# Patient Record
Sex: FEMALE | Race: WHITE | Employment: OTHER | URBAN - METROPOLITAN AREA
[De-identification: names, ages, dates, MRNs, and addresses within clinical notes are randomized per-mention and may not be internally consistent; named-entity substitution may affect disease eponyms.]

---

## 2017-01-04 ENCOUNTER — OFFICE VISIT (OUTPATIENT)
Dept: CARDIOLOGY CLINIC | Age: 67
End: 2017-01-04

## 2017-01-04 VITALS
WEIGHT: 131.4 LBS | SYSTOLIC BLOOD PRESSURE: 100 MMHG | DIASTOLIC BLOOD PRESSURE: 60 MMHG | HEART RATE: 62 BPM | HEIGHT: 64 IN | OXYGEN SATURATION: 99 % | BODY MASS INDEX: 22.43 KG/M2

## 2017-01-04 DIAGNOSIS — R42 DIZZINESS: ICD-10-CM

## 2017-01-04 DIAGNOSIS — R55 NEAR SYNCOPE: ICD-10-CM

## 2017-01-04 DIAGNOSIS — I95.9 HYPOTENSION, UNSPECIFIED HYPOTENSION TYPE: ICD-10-CM

## 2017-01-04 DIAGNOSIS — I48.91 NEW ONSET ATRIAL FIBRILLATION (HCC): Primary | ICD-10-CM

## 2017-01-04 PROCEDURE — G8484 FLU IMMUNIZE NO ADMIN: HCPCS | Performed by: NURSE PRACTITIONER

## 2017-01-04 PROCEDURE — G8399 PT W/DXA RESULTS DOCUMENT: HCPCS | Performed by: NURSE PRACTITIONER

## 2017-01-04 PROCEDURE — 3017F COLORECTAL CA SCREEN DOC REV: CPT | Performed by: NURSE PRACTITIONER

## 2017-01-04 PROCEDURE — 93000 ELECTROCARDIOGRAM COMPLETE: CPT | Performed by: NURSE PRACTITIONER

## 2017-01-04 PROCEDURE — 99214 OFFICE O/P EST MOD 30 MIN: CPT | Performed by: NURSE PRACTITIONER

## 2017-01-04 PROCEDURE — 4040F PNEUMOC VAC/ADMIN/RCVD: CPT | Performed by: NURSE PRACTITIONER

## 2017-01-04 PROCEDURE — G8427 DOCREV CUR MEDS BY ELIG CLIN: HCPCS | Performed by: NURSE PRACTITIONER

## 2017-01-04 PROCEDURE — 1111F DSCHRG MED/CURRENT MED MERGE: CPT | Performed by: NURSE PRACTITIONER

## 2017-01-04 PROCEDURE — 1123F ACP DISCUSS/DSCN MKR DOCD: CPT | Performed by: NURSE PRACTITIONER

## 2017-01-04 PROCEDURE — 3014F SCREEN MAMMO DOC REV: CPT | Performed by: NURSE PRACTITIONER

## 2017-01-04 PROCEDURE — 1090F PRES/ABSN URINE INCON ASSESS: CPT | Performed by: NURSE PRACTITIONER

## 2017-01-04 PROCEDURE — 1036F TOBACCO NON-USER: CPT | Performed by: NURSE PRACTITIONER

## 2017-01-04 PROCEDURE — G8419 CALC BMI OUT NRM PARAM NOF/U: HCPCS | Performed by: NURSE PRACTITIONER

## 2017-01-10 ENCOUNTER — TELEPHONE (OUTPATIENT)
Dept: CARDIOLOGY CLINIC | Age: 67
End: 2017-01-10

## 2017-01-26 ENCOUNTER — TELEPHONE (OUTPATIENT)
Dept: CARDIOLOGY CLINIC | Age: 67
End: 2017-01-26

## 2017-01-26 DIAGNOSIS — I48.19 PERSISTENT ATRIAL FIBRILLATION (HCC): Primary | ICD-10-CM

## 2017-02-07 ENCOUNTER — HOSPITAL ENCOUNTER (OUTPATIENT)
Dept: NON INVASIVE DIAGNOSTICS | Age: 67
Discharge: HOME | End: 2017-02-07
Attending: NURSE PRACTITIONER | Admitting: NURSE PRACTITIONER

## 2017-02-07 DIAGNOSIS — I48.19 PERSISTENT ATRIAL FIBRILLATION (HCC): ICD-10-CM

## 2017-02-07 LAB
LV EF: 75 %
LVEF MODALITY: NORMAL

## 2017-02-08 RX ORDER — FLECAINIDE ACETATE 50 MG/1
50 TABLET ORAL 2 TIMES DAILY
Qty: 60 TABLET | Refills: 3 | Status: SHIPPED | OUTPATIENT
Start: 2017-02-08 | End: 2017-05-16 | Stop reason: SDUPTHER

## 2017-02-08 RX ORDER — FLECAINIDE ACETATE 100 MG/1
50 TABLET ORAL EVERY 12 HOURS SCHEDULED
Status: DISCONTINUED | OUTPATIENT
Start: 2017-02-08 | End: 2017-02-08

## 2017-02-08 RX ORDER — DILTIAZEM HYDROCHLORIDE 120 MG/1
120 CAPSULE, COATED, EXTENDED RELEASE ORAL NIGHTLY
Status: DISCONTINUED | OUTPATIENT
Start: 2017-02-08 | End: 2017-02-08

## 2017-02-08 RX ORDER — DILTIAZEM HYDROCHLORIDE 120 MG/1
120 CAPSULE, COATED, EXTENDED RELEASE ORAL DAILY
Qty: 30 CAPSULE | Refills: 3 | Status: SHIPPED | OUTPATIENT
Start: 2017-02-08 | End: 2017-05-16 | Stop reason: SDUPTHER

## 2017-02-20 PROCEDURE — 93228 REMOTE 30 DAY ECG REV/REPORT: CPT | Performed by: NURSE PRACTITIONER

## 2017-02-21 ENCOUNTER — TELEPHONE (OUTPATIENT)
Dept: CARDIOLOGY CLINIC | Age: 67
End: 2017-02-21

## 2017-02-24 DIAGNOSIS — R42 DIZZINESS: ICD-10-CM

## 2017-02-24 DIAGNOSIS — I48.91 NEW ONSET ATRIAL FIBRILLATION (HCC): ICD-10-CM

## 2017-03-08 ENCOUNTER — OFFICE VISIT (OUTPATIENT)
Dept: CARDIOLOGY CLINIC | Age: 67
End: 2017-03-08

## 2017-03-08 VITALS
DIASTOLIC BLOOD PRESSURE: 80 MMHG | SYSTOLIC BLOOD PRESSURE: 120 MMHG | WEIGHT: 136.6 LBS | OXYGEN SATURATION: 94 % | HEIGHT: 64 IN | HEART RATE: 75 BPM | BODY MASS INDEX: 23.32 KG/M2

## 2017-03-08 DIAGNOSIS — R55 NEAR SYNCOPE: ICD-10-CM

## 2017-03-08 DIAGNOSIS — I48.91 NEW ONSET ATRIAL FIBRILLATION (HCC): Primary | ICD-10-CM

## 2017-03-08 DIAGNOSIS — I10 ESSENTIAL HYPERTENSION: ICD-10-CM

## 2017-03-08 PROBLEM — I95.0 IDIOPATHIC HYPOTENSION: Status: ACTIVE | Noted: 2017-03-08

## 2017-03-08 PROCEDURE — 4040F PNEUMOC VAC/ADMIN/RCVD: CPT | Performed by: NURSE PRACTITIONER

## 2017-03-08 PROCEDURE — 3014F SCREEN MAMMO DOC REV: CPT | Performed by: NURSE PRACTITIONER

## 2017-03-08 PROCEDURE — G8484 FLU IMMUNIZE NO ADMIN: HCPCS | Performed by: NURSE PRACTITIONER

## 2017-03-08 PROCEDURE — G8420 CALC BMI NORM PARAMETERS: HCPCS | Performed by: NURSE PRACTITIONER

## 2017-03-08 PROCEDURE — 3017F COLORECTAL CA SCREEN DOC REV: CPT | Performed by: NURSE PRACTITIONER

## 2017-03-08 PROCEDURE — G8427 DOCREV CUR MEDS BY ELIG CLIN: HCPCS | Performed by: NURSE PRACTITIONER

## 2017-03-08 PROCEDURE — 1090F PRES/ABSN URINE INCON ASSESS: CPT | Performed by: NURSE PRACTITIONER

## 2017-03-08 PROCEDURE — G8399 PT W/DXA RESULTS DOCUMENT: HCPCS | Performed by: NURSE PRACTITIONER

## 2017-03-08 PROCEDURE — 93000 ELECTROCARDIOGRAM COMPLETE: CPT | Performed by: NURSE PRACTITIONER

## 2017-03-08 PROCEDURE — 99213 OFFICE O/P EST LOW 20 MIN: CPT | Performed by: NURSE PRACTITIONER

## 2017-03-08 PROCEDURE — 1123F ACP DISCUSS/DSCN MKR DOCD: CPT | Performed by: NURSE PRACTITIONER

## 2017-03-08 PROCEDURE — 1036F TOBACCO NON-USER: CPT | Performed by: NURSE PRACTITIONER

## 2017-05-08 RX ORDER — APIXABAN 5 MG/1
TABLET, FILM COATED ORAL
Qty: 60 TABLET | Refills: 3 | Status: SHIPPED | OUTPATIENT
Start: 2017-05-08 | End: 2017-09-01 | Stop reason: SDUPTHER

## 2017-05-16 RX ORDER — FLECAINIDE ACETATE 50 MG/1
50 TABLET ORAL 2 TIMES DAILY
Qty: 60 TABLET | Refills: 5 | Status: SHIPPED | OUTPATIENT
Start: 2017-05-16 | End: 2017-11-28 | Stop reason: SDUPTHER

## 2017-05-16 RX ORDER — DILTIAZEM HYDROCHLORIDE 120 MG/1
120 CAPSULE, COATED, EXTENDED RELEASE ORAL DAILY
Qty: 30 CAPSULE | Refills: 5 | Status: SHIPPED | OUTPATIENT
Start: 2017-05-16 | End: 2017-11-28 | Stop reason: SDUPTHER

## 2017-09-01 RX ORDER — APIXABAN 5 MG/1
TABLET, FILM COATED ORAL
Qty: 60 TABLET | Refills: 4 | Status: SHIPPED | OUTPATIENT
Start: 2017-09-01 | End: 2018-02-05 | Stop reason: SDUPTHER

## 2017-09-18 ENCOUNTER — OFFICE VISIT (OUTPATIENT)
Dept: CARDIOLOGY CLINIC | Age: 67
End: 2017-09-18

## 2017-09-18 VITALS
OXYGEN SATURATION: 96 % | BODY MASS INDEX: 22.36 KG/M2 | HEART RATE: 76 BPM | HEIGHT: 64 IN | WEIGHT: 131 LBS | SYSTOLIC BLOOD PRESSURE: 124 MMHG | DIASTOLIC BLOOD PRESSURE: 78 MMHG

## 2017-09-18 DIAGNOSIS — I48.91 NEW ONSET ATRIAL FIBRILLATION (HCC): Primary | ICD-10-CM

## 2017-09-18 DIAGNOSIS — I10 ESSENTIAL HYPERTENSION: ICD-10-CM

## 2017-09-18 DIAGNOSIS — R42 DIZZINESS: ICD-10-CM

## 2017-09-18 DIAGNOSIS — R55 NEAR SYNCOPE: ICD-10-CM

## 2017-09-18 DIAGNOSIS — I95.0 IDIOPATHIC HYPOTENSION: ICD-10-CM

## 2017-09-18 PROCEDURE — 99213 OFFICE O/P EST LOW 20 MIN: CPT | Performed by: NURSE PRACTITIONER

## 2017-09-18 PROCEDURE — 1090F PRES/ABSN URINE INCON ASSESS: CPT | Performed by: NURSE PRACTITIONER

## 2017-09-18 PROCEDURE — G8420 CALC BMI NORM PARAMETERS: HCPCS | Performed by: NURSE PRACTITIONER

## 2017-09-18 PROCEDURE — 3017F COLORECTAL CA SCREEN DOC REV: CPT | Performed by: NURSE PRACTITIONER

## 2017-09-18 PROCEDURE — 3014F SCREEN MAMMO DOC REV: CPT | Performed by: NURSE PRACTITIONER

## 2017-09-18 PROCEDURE — 1036F TOBACCO NON-USER: CPT | Performed by: NURSE PRACTITIONER

## 2017-09-18 PROCEDURE — 4040F PNEUMOC VAC/ADMIN/RCVD: CPT | Performed by: NURSE PRACTITIONER

## 2017-09-18 PROCEDURE — 93000 ELECTROCARDIOGRAM COMPLETE: CPT | Performed by: NURSE PRACTITIONER

## 2017-09-18 PROCEDURE — 1123F ACP DISCUSS/DSCN MKR DOCD: CPT | Performed by: NURSE PRACTITIONER

## 2017-09-18 PROCEDURE — G8427 DOCREV CUR MEDS BY ELIG CLIN: HCPCS | Performed by: NURSE PRACTITIONER

## 2017-09-18 PROCEDURE — G8399 PT W/DXA RESULTS DOCUMENT: HCPCS | Performed by: NURSE PRACTITIONER

## 2017-11-28 RX ORDER — FLECAINIDE ACETATE 50 MG/1
50 TABLET ORAL 2 TIMES DAILY
Qty: 60 TABLET | Refills: 5 | Status: SHIPPED | OUTPATIENT
Start: 2017-11-28 | End: 2018-05-30 | Stop reason: SDUPTHER

## 2017-11-28 RX ORDER — DILTIAZEM HYDROCHLORIDE 120 MG/1
120 CAPSULE, COATED, EXTENDED RELEASE ORAL DAILY
Qty: 30 CAPSULE | Refills: 5 | Status: SHIPPED | OUTPATIENT
Start: 2017-11-28 | End: 2018-05-30 | Stop reason: SDUPTHER

## 2018-03-19 ENCOUNTER — OFFICE VISIT (OUTPATIENT)
Dept: CARDIOLOGY CLINIC | Age: 68
End: 2018-03-19

## 2018-03-19 VITALS
BODY MASS INDEX: 22.2 KG/M2 | SYSTOLIC BLOOD PRESSURE: 114 MMHG | HEART RATE: 64 BPM | DIASTOLIC BLOOD PRESSURE: 68 MMHG | WEIGHT: 130 LBS | OXYGEN SATURATION: 97 % | HEIGHT: 64 IN

## 2018-03-19 DIAGNOSIS — R55 SYNCOPE, UNSPECIFIED SYNCOPE TYPE: ICD-10-CM

## 2018-03-19 DIAGNOSIS — I10 ESSENTIAL HYPERTENSION: ICD-10-CM

## 2018-03-19 DIAGNOSIS — I48.91 NEW ONSET ATRIAL FIBRILLATION (HCC): Primary | ICD-10-CM

## 2018-03-19 LAB
ALBUMIN SERPL-MCNC: 4.7 G/DL (ref 3.4–5)
ANION GAP SERPL CALCULATED.3IONS-SCNC: 13 MMOL/L (ref 3–16)
BUN BLDV-MCNC: 16 MG/DL (ref 7–20)
CALCIUM SERPL-MCNC: 9.4 MG/DL (ref 8.3–10.6)
CHLORIDE BLD-SCNC: 105 MMOL/L (ref 99–110)
CO2: 26 MMOL/L (ref 21–32)
CREAT SERPL-MCNC: 0.8 MG/DL (ref 0.6–1.2)
GFR AFRICAN AMERICAN: >60
GFR NON-AFRICAN AMERICAN: >60
GLUCOSE BLD-MCNC: 86 MG/DL (ref 70–99)
MAGNESIUM: 2 MG/DL (ref 1.8–2.4)
PHOSPHORUS: 3.5 MG/DL (ref 2.5–4.9)
POTASSIUM SERPL-SCNC: 5 MMOL/L (ref 3.5–5.1)
SODIUM BLD-SCNC: 144 MMOL/L (ref 136–145)

## 2018-03-19 PROCEDURE — 99213 OFFICE O/P EST LOW 20 MIN: CPT | Performed by: NURSE PRACTITIONER

## 2018-03-19 PROCEDURE — G8484 FLU IMMUNIZE NO ADMIN: HCPCS | Performed by: NURSE PRACTITIONER

## 2018-03-19 PROCEDURE — G8399 PT W/DXA RESULTS DOCUMENT: HCPCS | Performed by: NURSE PRACTITIONER

## 2018-03-19 PROCEDURE — 3014F SCREEN MAMMO DOC REV: CPT | Performed by: NURSE PRACTITIONER

## 2018-03-19 PROCEDURE — 4040F PNEUMOC VAC/ADMIN/RCVD: CPT | Performed by: NURSE PRACTITIONER

## 2018-03-19 PROCEDURE — 93000 ELECTROCARDIOGRAM COMPLETE: CPT | Performed by: NURSE PRACTITIONER

## 2018-03-19 PROCEDURE — G8427 DOCREV CUR MEDS BY ELIG CLIN: HCPCS | Performed by: NURSE PRACTITIONER

## 2018-03-19 PROCEDURE — 1090F PRES/ABSN URINE INCON ASSESS: CPT | Performed by: NURSE PRACTITIONER

## 2018-03-19 PROCEDURE — 1123F ACP DISCUSS/DSCN MKR DOCD: CPT | Performed by: NURSE PRACTITIONER

## 2018-03-19 PROCEDURE — G8420 CALC BMI NORM PARAMETERS: HCPCS | Performed by: NURSE PRACTITIONER

## 2018-03-19 PROCEDURE — 1036F TOBACCO NON-USER: CPT | Performed by: NURSE PRACTITIONER

## 2018-03-19 PROCEDURE — 3017F COLORECTAL CA SCREEN DOC REV: CPT | Performed by: NURSE PRACTITIONER

## 2018-03-19 NOTE — PROGRESS NOTES
use drugs. Family History:  Reviewed. family history includes Heart Disease in her brother and father; Other in her father and mother. Review of System:    · General ROS: negative for chills, fever, change in weight   · Psychological ROS: negative for  anxiety or depression  · Ophthalmic ROS: negative for  eye pain or loss of vision  · ENT ROS: negative for  headaches, sore throat   · Allergy and Immunology ROS: negative for  hives  · Hematological and Lymphatic ROS: negative for  bleeding problems, blood clots, bruising or jaundice  · Endocrine ROS: negative for  skin changes, temperature intolerance or unexpected weight changes  · Respiratory ROS: negative for  cough, sputum, wheezing, shortness of breath   · Cardiovascular ROS: Per HPI. · Gastrointestinal ROS: negative for abdominal pain, diarrhea, nausea/vomiting, bleeding   · Genito-Urinary ROS: negative for  dysuria or incontinence  · Musculoskeletal ROS: negative for  joint swelling, pain    · Neurological ROS: negative for  confusion, numbness/tingling, seizures, weakness  · Dermatological ROS: negative for rash, changes in skin color, lesions     Physical Examination:  /68   Pulse 64   Ht 5' 4\" (1.626 m)   Wt 130 lb (59 kg) Comment: did not wish to remove shoes  SpO2 97%   BMI 22.31 kg/m²     · Constitutional: Oriented. No distress. · Head: Normocephalic and atraumatic. · Mouth/Throat: Oropharynx is clear and moist.   · Eyes: Conjunctivae normal. EOM are normal.   · Neck: Normal range of motion. Neck supple. No rigidity. No JVD present. · Cardiovascular: Normal rate, regular rhythm, S1&S2 and intact distal pulses. · Pulmonary/Chest: Bilateral respiratory sounds. No wheezes. No rhonchi. · Abdominal: Soft. Bowel sounds present. No distension, No tenderness. · Musculoskeletal: No tenderness. No edema    · Lymphadenopathy: Has no cervical adenopathy. · Neurological: Alert and oriented.  Cranial nerve appears intact, No

## 2018-03-30 ENCOUNTER — HOSPITAL ENCOUNTER (OUTPATIENT)
Dept: GENERAL RADIOLOGY | Age: 68
Discharge: HOME | End: 2018-03-30
Attending: FAMILY MEDICINE | Admitting: FAMILY MEDICINE

## 2018-03-30 DIAGNOSIS — M81.0 AGE-RELATED OSTEOPOROSIS WITHOUT CURRENT PATHOLOGICAL FRACTURE: ICD-10-CM

## 2018-05-23 RX ORDER — APIXABAN 5 MG/1
TABLET, FILM COATED ORAL
Qty: 60 TABLET | Refills: 3 | Status: SHIPPED | OUTPATIENT
Start: 2018-05-23 | End: 2018-10-10 | Stop reason: SDUPTHER

## 2018-05-30 RX ORDER — FLECAINIDE ACETATE 50 MG/1
50 TABLET ORAL 2 TIMES DAILY
Qty: 60 TABLET | Refills: 5 | Status: SHIPPED | OUTPATIENT
Start: 2018-05-30 | End: 2018-11-19 | Stop reason: SDUPTHER

## 2018-05-30 RX ORDER — DILTIAZEM HYDROCHLORIDE 120 MG/1
120 CAPSULE, COATED, EXTENDED RELEASE ORAL DAILY
Qty: 30 CAPSULE | Refills: 5 | Status: SHIPPED | OUTPATIENT
Start: 2018-05-30 | End: 2018-11-19 | Stop reason: SDUPTHER

## 2018-11-19 RX ORDER — FLECAINIDE ACETATE 50 MG/1
TABLET ORAL
Qty: 60 TABLET | Refills: 5 | Status: SHIPPED | OUTPATIENT
Start: 2018-11-19 | End: 2019-05-20 | Stop reason: SDUPTHER

## 2018-11-19 RX ORDER — DILTIAZEM HYDROCHLORIDE 120 MG/1
120 CAPSULE, EXTENDED RELEASE ORAL DAILY
Qty: 30 CAPSULE | Refills: 5 | Status: SHIPPED | OUTPATIENT
Start: 2018-11-19 | End: 2019-05-20 | Stop reason: SDUPTHER

## 2019-01-21 RX ORDER — APIXABAN 5 MG/1
TABLET, FILM COATED ORAL
Qty: 60 TABLET | Refills: 5 | Status: SHIPPED | OUTPATIENT
Start: 2019-01-21 | End: 2019-07-24 | Stop reason: SDUPTHER

## 2019-04-01 ENCOUNTER — OFFICE VISIT (OUTPATIENT)
Dept: CARDIOLOGY CLINIC | Age: 69
End: 2019-04-01
Payer: MEDICARE

## 2019-04-01 VITALS
BODY MASS INDEX: 21.68 KG/M2 | SYSTOLIC BLOOD PRESSURE: 92 MMHG | DIASTOLIC BLOOD PRESSURE: 66 MMHG | WEIGHT: 127 LBS | OXYGEN SATURATION: 98 % | HEART RATE: 64 BPM | HEIGHT: 64 IN

## 2019-04-01 DIAGNOSIS — I48.0 PAF (PAROXYSMAL ATRIAL FIBRILLATION) (HCC): Primary | ICD-10-CM

## 2019-04-01 PROCEDURE — 99214 OFFICE O/P EST MOD 30 MIN: CPT | Performed by: INTERNAL MEDICINE

## 2019-04-01 PROCEDURE — G8427 DOCREV CUR MEDS BY ELIG CLIN: HCPCS | Performed by: INTERNAL MEDICINE

## 2019-04-01 PROCEDURE — 3017F COLORECTAL CA SCREEN DOC REV: CPT | Performed by: INTERNAL MEDICINE

## 2019-04-01 PROCEDURE — 1036F TOBACCO NON-USER: CPT | Performed by: INTERNAL MEDICINE

## 2019-04-01 PROCEDURE — G8399 PT W/DXA RESULTS DOCUMENT: HCPCS | Performed by: INTERNAL MEDICINE

## 2019-04-01 PROCEDURE — 4040F PNEUMOC VAC/ADMIN/RCVD: CPT | Performed by: INTERNAL MEDICINE

## 2019-04-01 PROCEDURE — 1123F ACP DISCUSS/DSCN MKR DOCD: CPT | Performed by: INTERNAL MEDICINE

## 2019-04-01 PROCEDURE — 1090F PRES/ABSN URINE INCON ASSESS: CPT | Performed by: INTERNAL MEDICINE

## 2019-04-01 PROCEDURE — 93000 ELECTROCARDIOGRAM COMPLETE: CPT | Performed by: INTERNAL MEDICINE

## 2019-04-01 PROCEDURE — G8420 CALC BMI NORM PARAMETERS: HCPCS | Performed by: INTERNAL MEDICINE

## 2019-04-01 NOTE — LETTER
[address], Dr.       Allergies:  No Known Allergies    Medication:   Prior to Admission medications    Medication Sig Start Date End Date Taking? Authorizing Provider   ELIQUIS 5 MG TABS tablet TAKE 1 TABLET BY MOUTH TWICE DAILY 1/21/19  Yes Don Cuevas MD   CARTIA  MG extended release capsule TAKE 1 CAPSULE BY MOUTH DAILY 11/19/18  Yes Don Cuevas MD   flecainide (TAMBOCOR) 50 MG tablet TAKE 1 TABLET BY MOUTH TWICE DAILY 11/19/18  Yes Don Cuevas MD   Calcium-Magnesium-Vitamin D (CALCIUM 500 PO) Take 500 mg by mouth daily   Yes Historical Provider, MD   ranitidine (ZANTAC) 150 MG tablet Take 150 mg by mouth nightly. Yes Historical Provider, MD   calcitonin, salmon, (MIACALCIN) 200 UNIT/ACT nasal spray 1 spray by Nasal route daily. Yes Historical Provider, MD       Social History:   reports that she has never smoked. She has never used smokeless tobacco. She reports that she does not drink alcohol or use drugs. Family History:  family history includes Heart Disease in her brother and father; Other in her father and mother. Reviewed. Denies family history of sudden cardiac death, arrhythmia, premature CAD    Review of System:    · General ROS: negative for - chills, fever   · Psychological ROS: negative for - anxiety or depression  · Ophthalmic ROS: negative for - eye pain or loss of vision  · ENT ROS: negative for - epistaxis, headaches, nasal discharge, sore throat   · Allergy and Immunology ROS: negative for - hives, nasal congestion   · Hematological and Lymphatic ROS: negative for - bleeding problems, blood clots, bruising or jaundice  · Endocrine ROS: negative for - skin changes, temperature intolerance or unexpected weight changes  · Respiratory ROS: negative for - cough, hemoptysis, pleuritic pain, SOB, sputum changes or wheezing  · Cardiovascular ROS: Per HPI. · Gastrointestinal ROS: negative for - abdominal pain, blood in stools, diarrhea, hematemesis, melena, nausea/vomiting or swallowing difficulty/pain  · Genito-Urinary ROS: negative for - dysuria or incontinence  · Musculoskeletal ROS: negative for - joint swelling or muscle pain  · Neurological ROS: negative for - confusion, dizziness, gait disturbance, headaches, numbness/tingling, seizures, speech problems, tremors, visual changes or weakness  · Dermatological ROS: negative for - rash    Physical Examination:  Vitals:    04/01/19 1441   BP: 92/66   Site: Left Upper Arm   Position: Sitting   Pulse: 64   SpO2: 98%   Weight: 127 lb (57.6 kg)   Height: 5' 4\" (1.626 m)         · Constitutional: Oriented. No distress. · Head: Normocephalic and atraumatic. · Mouth/Throat: Oropharynx is clear and moist.   · Eyes: Conjunctivae normal. EOM are normal.   · Neck: Normal range of motion. Neck supple. No rigidity. No JVD present. · Cardiovascular: Normal rate, regular rhythm, S1&S2 and intact distal pulses. · Pulmonary/Chest: Bilateral respiratory sounds. No wheezes. No rhonchi. · Abdominal: Soft. Bowel sounds present. No distension, No tenderness. · Musculoskeletal: No tenderness. No edema    · Lymphadenopathy: Has no cervical adenopathy. · Neurological: Alert and oriented. Cranial nerve appears intact, No Gross deficit   · Skin: Skin is warm and dry. No rash noted. · Psychiatric: Has a normal mood, affect and behavior     Labs:  Reviewed. ECG: reviewed, Sinus  Rhythm 65 bpm, with QRS duration 94 ms. No pathologic Q waves, ventricular pre-excitation, or QT prolongation. QTc 442. Studies:   1. Event monitor: 1/10/17-2/10/17  Baseline SR   AF with RVR, symptoms correlate with RVR     2. Echo: 12/16/16  Summary   Left ventricle size is normal.   Normal left ventricular wall thickness.   Ejection fraction is visually estimated to be 60%.   Normal right ventricular size and function.  No significant valvular disease. 3. Stress Test:  2/7/17   Summary    Normal myocardial perfusion study.    Normal LV size and systolic function.  ECG portion of stress test is normal.    Alford score equals 4.    Overall findings represent a low risk scan.         4. Cath: none     The MCOT, echocardiogram, stress test, and coronary angiography/PCI were reviewed by myself and used for my plan of care. Procedures:  1. None     Assessment/Plan:     Atrial fibrillation, paroxsymal   [id number] is 2 with a 3.2% per year stroke risk age, sex, HTN   No s/s of TIA/CVA   Her current medical therapy for her atrial fibrillation is Eliquis 5 mg BID, Cartia  mg daily, Flecainide 50 mg BID. She reports medical therapy compliance and feels she is tolerating. No abnormal bruising or bleeding. We discussed treatment options for Atrial fibrillation including medical vs. Ablation therapy. At this time, she would like to proceed with medical therapy. Hypertension, essential   She does monitor her HR and BP twice daily and keeps a log. Stable SBP high 90's to low 100's. Asked her to maintain good hydration, no more than 3G sodium daily. We discussed reading labels. Yearly follow up       Thank you for allowing me to participate in the care of Dary Powell. All questions and concerns were addressed to the patient/family. Alternatives to my treatment were discussed. This note was scribed in the presence of Dr. Jose Duggan MD by Hillary Barajas RN. The scribe's documentation has been prepared under my direction and personally reviewed by me in its entirety. I confirm that the note above accurately reflects all work, physical examination, the discussion of treatments and procedures, and medical decision making performed by me.       Jose Duggan MD, MS, [facility] [place], [place] [address] Electrophysiology  [address]  [address] [facility], [address] [facility]  [place] [address] [phone]

## 2019-04-01 NOTE — PATIENT INSTRUCTIONS

## 2019-04-01 NOTE — PROGRESS NOTES
[facility]   Cardiac Electrophysiology Consultation   Date: 4/1/2019  Reason for Consultation: AFIB  Consult Requesting Physician: Dr. Dixon/JAELYN Calvo      Chief Complaint:   Chief Complaint   Patient presents with    Atrial Fibrillation     Last seen by TRAM Calvo 3/19/18. No cardiac complaints today. HPI: Viviane Galarza is a 70 y.o. with PMH significant for syncope, hypotension, pAFIB-new onset 12/16/2016 0400. Converted per cardizem gtt. At that time, she had diarrhea, near syncope, fell. \"woozy and weak feeling. \" CT revealed extensive colitis at that time. Interval History: Today, she present for 1 year follow up and states she has been doing \"very well with no cardiac complaints. \" Her current medical therapy for her atrial fibrillation is Eliquis 5 mg BID, Cartia  mg daily, Flecainide 50 mg BID. Mercedes Epley is 2 with a 3.2% per year stroke risk age, sex, HTN . Cyaida Cates No s/s of TIA/CVA. She does monitor her HR and BP twice daily and keeps a log. Stable SBP high 90's to low 100's. Asked her to maintain good hydration, no more than 3G sodium daily. We discussed reading labels. She reports medical therapy compliance and feels she is tolerating. No abnormal bruising or bleeding. Patient denies any symptoms of chest pain, pressure, tightness, shortness of breath at rest, CORONA, nausea, vomiting, near syncope, syncope, heart racing, palpitations, dizziness, lightheaded, PND, orthopnea, wheezing, diaphoresis, BLE edema, bilateral lower extremities pain, cramping or fatigue. We discussed treatment options for Atrial fibrillation including medical vs. Ablation therapy. At this time, she would like to proceed with medical therapy.        Past Medical History:   Diagnosis Date    Acid reflux         Past Surgical History:   Procedure Laterality Date    COLONOSCOPY  11/04/2014    ENDOSCOPY, COLON, DIAGNOSTIC  11/04/2014    with biopsy   Azeb Valiente    TUBAL LIGATION Bilateral [address], Dr. Marking       Allergies:  No Known Allergies    Medication:   Prior to Admission medications    Medication Sig Start Date End Date Taking? Authorizing Provider   ELIQUIS 5 MG TABS tablet TAKE 1 TABLET BY MOUTH TWICE DAILY 1/21/19  Yes Chantal Barros MD   CARTIA  MG extended release capsule TAKE 1 CAPSULE BY MOUTH DAILY 11/19/18  Yes Chantal Barros MD   flecainide (TAMBOCOR) 50 MG tablet TAKE 1 TABLET BY MOUTH TWICE DAILY 11/19/18  Yes Chantal Barros MD   Calcium-Magnesium-Vitamin D (CALCIUM 500 PO) Take 500 mg by mouth daily   Yes Historical Provider, MD   ranitidine (ZANTAC) 150 MG tablet Take 150 mg by mouth nightly. Yes Historical Provider, MD   calcitonin, salmon, (MIACALCIN) 200 UNIT/ACT nasal spray 1 spray by Nasal route daily. Yes Historical Provider, MD       Social History:   reports that she has never smoked. She has never used smokeless tobacco. She reports that she does not drink alcohol or use drugs. Family History:  family history includes Heart Disease in her brother and father; Other in her father and mother. Reviewed. Denies family history of sudden cardiac death, arrhythmia, premature CAD    Review of System:    · General ROS: negative for - chills, fever   · Psychological ROS: negative for - anxiety or depression  · Ophthalmic ROS: negative for - eye pain or loss of vision  · ENT ROS: negative for - epistaxis, headaches, nasal discharge, sore throat   · Allergy and Immunology ROS: negative for - hives, nasal congestion   · Hematological and Lymphatic ROS: negative for - bleeding problems, blood clots, bruising or jaundice  · Endocrine ROS: negative for - skin changes, temperature intolerance or unexpected weight changes  · Respiratory ROS: negative for - cough, hemoptysis, pleuritic pain, SOB, sputum changes or wheezing  · Cardiovascular ROS: Per HPI.    · Gastrointestinal ROS: negative for - abdominal pain, blood in stools, diarrhea, hematemesis, melena, nausea/vomiting or swallowing difficulty/pain  · Genito-Urinary ROS: negative for - dysuria or incontinence  · Musculoskeletal ROS: negative for - joint swelling or muscle pain  · Neurological ROS: negative for - confusion, dizziness, gait disturbance, headaches, numbness/tingling, seizures, speech problems, tremors, visual changes or weakness  · Dermatological ROS: negative for - rash    Physical Examination:  Vitals:    04/01/19 1441   BP: 92/66   Site: Left Upper Arm   Position: Sitting   Pulse: 64   SpO2: 98%   Weight: 127 lb (57.6 kg)   Height: 5' 4\" (1.626 m)         · Constitutional: Oriented. No distress. · Head: Normocephalic and atraumatic. · Mouth/Throat: Oropharynx is clear and moist.   · Eyes: Conjunctivae normal. EOM are normal.   · Neck: Normal range of motion. Neck supple. No rigidity. No JVD present. · Cardiovascular: Normal rate, regular rhythm, S1&S2 and intact distal pulses. · Pulmonary/Chest: Bilateral respiratory sounds. No wheezes. No rhonchi. · Abdominal: Soft. Bowel sounds present. No distension, No tenderness. · Musculoskeletal: No tenderness. No edema    · Lymphadenopathy: Has no cervical adenopathy. · Neurological: Alert and oriented. Cranial nerve appears intact, No Gross deficit   · Skin: Skin is warm and dry. No rash noted. · Psychiatric: Has a normal mood, affect and behavior     Labs:  Reviewed. ECG: reviewed, Sinus  Rhythm 65 bpm, with QRS duration 94 ms. No pathologic Q waves, ventricular pre-excitation, or QT prolongation. QTc 442. Studies:   1. Event monitor: 1/10/17-2/10/17  Baseline SR   AF with RVR, symptoms correlate with RVR     2. Echo: 12/16/16  Summary   Left ventricle size is normal.   Normal left ventricular wall thickness.   Ejection fraction is visually estimated to be 60%.   Normal right ventricular size and function.   No significant valvular disease.     3. Stress Test:  2/7/17   Summary    Normal myocardial perfusion study.    Normal LV size and systolic function.  ECG portion of stress test is normal.    Alford score equals 4.    Overall findings represent a low risk scan.         4. Cath: none     The MCOT, echocardiogram, stress test, and coronary angiography/PCI were reviewed by myself and used for my plan of care. Procedures:  1. None     Assessment/Plan:     Atrial fibrillation, paroxsymal   [id number] is 2 with a 3.2% per year stroke risk age, sex, HTN   No s/s of TIA/CVA   Her current medical therapy for her atrial fibrillation is Eliquis 5 mg BID, Cartia  mg daily, Flecainide 50 mg BID. She reports medical therapy compliance and feels she is tolerating. No abnormal bruising or bleeding. We discussed treatment options for Atrial fibrillation including medical vs. Ablation therapy. At this time, she would like to proceed with medical therapy. Hypertension, essential   She does monitor her HR and BP twice daily and keeps a log. Stable SBP high 90's to low 100's. Asked her to maintain good hydration, no more than 3G sodium daily. We discussed reading labels. Yearly follow up       Thank you for allowing me to participate in the care of Kimberly Clinton. All questions and concerns were addressed to the patient/family. Alternatives to my treatment were discussed. This note was scribed in the presence of Dr. Venu Saldaña MD by Andressa Man RN. The scribe's documentation has been prepared under my direction and personally reviewed by me in its entirety. I confirm that the note above accurately reflects all work, physical examination, the discussion of treatments and procedures, and medical decision making performed by me.       Venu Saldaña MD, MS, [facility] [place], [facility]  Cardiac Electrophysiology  [address]  [address] [facility], [facility] [facility]  [place] [facility] [address]  [phone]

## 2019-05-20 RX ORDER — DILTIAZEM HYDROCHLORIDE 120 MG/1
CAPSULE, COATED, EXTENDED RELEASE ORAL
Qty: 30 CAPSULE | Refills: 2 | Status: SHIPPED | OUTPATIENT
Start: 2019-05-20 | End: 2019-08-19 | Stop reason: SDUPTHER

## 2019-05-20 RX ORDER — FLECAINIDE ACETATE 50 MG/1
TABLET ORAL
Qty: 60 TABLET | Refills: 2 | Status: SHIPPED | OUTPATIENT
Start: 2019-05-20 | End: 2019-08-19 | Stop reason: SDUPTHER

## 2019-05-20 NOTE — TELEPHONE ENCOUNTER
Last O/V:  4/1/19  Next O/V:  X Patient requests call back with recommendations on what she can take for bronchitis

## 2019-07-24 RX ORDER — APIXABAN 5 MG/1
TABLET, FILM COATED ORAL
Qty: 60 TABLET | Refills: 2 | Status: CANCELLED | OUTPATIENT
Start: 2019-07-24

## 2019-08-21 RX ORDER — DILTIAZEM HYDROCHLORIDE 120 MG/1
CAPSULE, EXTENDED RELEASE ORAL
Qty: 30 CAPSULE | Refills: 0 | Status: SHIPPED | OUTPATIENT
Start: 2019-08-21 | End: 2019-09-18 | Stop reason: SDUPTHER

## 2019-08-21 RX ORDER — FLECAINIDE ACETATE 50 MG/1
TABLET ORAL
Qty: 60 TABLET | Refills: 0 | Status: SHIPPED | OUTPATIENT
Start: 2019-08-21 | End: 2019-09-18 | Stop reason: SDUPTHER

## 2019-09-19 RX ORDER — DILTIAZEM HYDROCHLORIDE 120 MG/1
CAPSULE, EXTENDED RELEASE ORAL
Qty: 30 CAPSULE | Refills: 0 | Status: SHIPPED | OUTPATIENT
Start: 2019-09-19 | End: 2019-10-14 | Stop reason: SDUPTHER

## 2019-09-19 RX ORDER — FLECAINIDE ACETATE 50 MG/1
TABLET ORAL
Qty: 60 TABLET | Refills: 0 | Status: SHIPPED | OUTPATIENT
Start: 2019-09-19 | End: 2019-10-14 | Stop reason: SDUPTHER

## 2019-09-25 RX ORDER — APIXABAN 5 MG/1
TABLET, FILM COATED ORAL
Qty: 60 TABLET | Refills: 1 | Status: SHIPPED | OUTPATIENT
Start: 2019-09-25 | End: 2019-11-19 | Stop reason: SDUPTHER

## 2019-10-15 RX ORDER — FLECAINIDE ACETATE 50 MG/1
TABLET ORAL
Qty: 60 TABLET | Refills: 3 | Status: SHIPPED | OUTPATIENT
Start: 2019-10-15 | End: 2020-02-24

## 2019-10-15 RX ORDER — DILTIAZEM HYDROCHLORIDE 120 MG/1
CAPSULE, EXTENDED RELEASE ORAL
Qty: 30 CAPSULE | Refills: 3 | Status: SHIPPED | OUTPATIENT
Start: 2019-10-15 | End: 2020-02-19 | Stop reason: ALTCHOICE

## 2019-10-30 ENCOUNTER — TELEPHONE (OUTPATIENT)
Dept: CARDIOLOGY CLINIC | Age: 69
End: 2019-10-30

## 2019-11-19 RX ORDER — APIXABAN 5 MG/1
TABLET, FILM COATED ORAL
Qty: 60 TABLET | Refills: 2 | Status: SHIPPED | OUTPATIENT
Start: 2019-11-19 | End: 2020-02-19 | Stop reason: SDUPTHER

## 2019-11-19 RX ORDER — APIXABAN 5 MG/1
TABLET, FILM COATED ORAL
Qty: 60 TABLET | Refills: 0 | Status: CANCELLED | OUTPATIENT
Start: 2019-11-19

## 2020-01-24 ENCOUNTER — TELEPHONE (OUTPATIENT)
Dept: CARDIOLOGY CLINIC | Age: 70
End: 2020-01-24

## 2020-01-24 NOTE — TELEPHONE ENCOUNTER
Please advise of any recs. Per last OV - Dr. Quinonez Theodore in Care Everywhere 01/10/20 -  Hold cardizem. bp has been averaging 80-98/60. as low as 86/61. Need to hold for one week and evaluate. The most pressing thing today is stomach flu symptoms for one day. She is nauseas and feeling dizzy. afebrile. orthostats negative in the office. </p>    Called/spoke to pt. She states that she has been off [place] for 11 days now. BP has been in 90/60 range and this AM it was 87/64. Readings are since being off the [place]. Her pulse has been ranging from 80 - 90.

## 2020-01-24 NOTE — TELEPHONE ENCOUNTER
Pt states she was having trouble with her BP going low. Her PCP told her to stop taking CARTIA for a week. She still noticed that it was going low but was nervous to go back on the [place]. She wants to know if she should go back on it or if there was a different medication.      Please call Agustin Moore back at [phone]

## 2020-02-18 NOTE — PROGRESS NOTES
2 times daily 7/24/19  Yes Cory Garrett MD   Calcium-Magnesium-Vitamin D (CALCIUM 500 PO) Take 500 mg by mouth daily   Yes Historical Provider, MD   ranitidine (ZANTAC) 150 MG tablet Take 150 mg by mouth nightly. Yes Historical Provider, MD   calcitonin, salmon, (MIACALCIN) 200 UNIT/ACT nasal spray 1 spray by Nasal route daily. Yes Historical Provider, MD   CARTIA  MG extended release capsule TAKE 1 CAPSULE BY MOUTH DAILY  Patient not taking: Reported on 2/19/2020 10/15/19   Cory Garrett MD       Social History:   reports that she has never smoked. She has never used smokeless tobacco. She reports that she does not drink alcohol or use drugs. Family History:  family history includes Heart Disease in her brother and father; Other in her father and mother. Reviewed. Denies family history of sudden cardiac death, arrhythmia, premature CAD    Review of System:    · General ROS: negative for - chills, fever   · Psychological ROS: negative for - anxiety or depression  · Ophthalmic ROS: negative for - eye pain or loss of vision  · ENT ROS: negative for - epistaxis, headaches, nasal discharge, sore throat   · Allergy and Immunology ROS: negative for - hives, nasal congestion   · Hematological and Lymphatic ROS: negative for - bleeding problems, blood clots, bruising or jaundice  · Endocrine ROS: negative for - skin changes, temperature intolerance or unexpected weight changes  · Respiratory ROS: negative for - cough, hemoptysis, pleuritic pain, SOB, sputum changes or wheezing  · Cardiovascular ROS: Per HPI.    · Gastrointestinal ROS: negative for - abdominal pain, blood in stools, diarrhea, hematemesis, melena, nausea/vomiting or swallowing difficulty/pain  · Genito-Urinary ROS: negative for - dysuria or incontinence  · Musculoskeletal ROS: negative for - joint swelling or muscle pain  · Neurological ROS: negative for - confusion, dizziness, gait disturbance, headaches, numbness/tingling, seizures, speech problems, tremors, visual changes or weakness  · Dermatological ROS: negative for - rash    Physical Examination:  Vitals:    20 1107   BP: 110/66   Site: Left Upper Arm   Position: Sitting   Pulse: 85   SpO2: 96%   Weight: 118 lb (53.5 kg)   Height: 5' 4\" (1.626 m)         · Constitutional: Oriented. No distress. · Head: Normocephalic and atraumatic. · Mouth/Throat: Oropharynx is clear and moist.   · Eyes: Conjunctivae normal. EOM are normal.   · Neck: Normal range of motion. Neck supple. No rigidity. No JVD present. · Cardiovascular: Normal rate, regular rhythm, S1&S2 and intact distal pulses. · Pulmonary/Chest: Bilateral respiratory sounds. No wheezes. No rhonchi. · Abdominal: Soft. Bowel sounds present. No distension, No tenderness. · Musculoskeletal: No tenderness. No edema    · Lymphadenopathy: Has no cervical adenopathy. · Neurological: Alert and oriented. Cranial nerve appears intact, No Gross deficit   · Skin: Skin is warm and dry. No rash noted. · Psychiatric: Has a normal mood, affect and behavior     Labs:  Reviewed. EC20 reviewed, sinus rhythmn 73 bpm, with QRS duration 92 ms. No pathologic Q waves, ventricular pre-excitation, or QT prolongation. QTc 442. Studies:   1. Event monitor: 1/10/17-2/10/17  Baseline SR   AF with RVR, symptoms correlate with RVR     2. Echo: 16  Summary   Left ventricle size is normal.   Normal left ventricular wall thickness.   Ejection fraction is visually estimated to be 60%.   Normal right ventricular size and function.   No significant valvular disease. 3. Stress Test:  17   Summary    Normal myocardial perfusion study.    Normal LV size and systolic function.     ECG portion of stress test is normal.    Alford score equals 4.    Overall findings represent a low risk scan.         4. Cath: none     The MCOT, echocardiogram, stress test, and coronary angiography/PCI were reviewed by myself and used for my plan of to, bleeding, infection, radiation exposure, injury to vascular, cardiac and surrounding structures (including pneumothorax), stroke, cardiac perforation, tamponade, need for emergent open heart surgery, need for pacemaker implantation, injury to the phrenic nerve, injury to the esophagus, myocardial infarction and death were discussed in detail. The patient would like some time to deliberate further regarding procedure. If she wishes to proceed, he will contact our nurse, Vish Cook at which time we will schedule for a radiofrequency ablation with Carto Navigation system with a GIANNA procedure immediately prior to this ablation. A cardiac CTA will be ordered for pulmonary vein mapping prior to this procedure. We will hold Eliquis  for 12 hours prior to procedure. We will order BMP, CBC, PT/INR, and Type & Screen prior to the procedure. Hypertension, essential   She reports low blood pressure readings. SBP occasionally in 70's and 80's  most time running high . Stop Cartia today start Toprol XL 12.5 mg daily. Advised that she can increase her sodium intake over the recommended 2400 mg daily. Follow up 3 months after atrial fibrillation ablation if she decides to proceed other wise follow up in one year. Thank you for allowing me to participate in the care of Zulema Elias. All questions and concerns were addressed to the patient/family. Alternatives to my treatment were discussed. This note was scribed in the presence of Pina Talley MD by Maynor Sierra RN. The scribe's documentation has been prepared under my direction and personally reviewed by me in its entirety. I confirm that the note above accurately reflects all work, physical examination, the discussion of treatments and procedures, and medical decision making performed by me.       Pina Talley MD, MS, [facility] [place], [facility] [address] Electrophysiology  [address]  [address] [facility], [address], [facility] [address]  [phone]

## 2020-02-19 ENCOUNTER — OFFICE VISIT (OUTPATIENT)
Dept: CARDIOLOGY CLINIC | Age: 70
End: 2020-02-19
Payer: MEDICARE

## 2020-02-19 VITALS
HEART RATE: 85 BPM | HEIGHT: 64 IN | SYSTOLIC BLOOD PRESSURE: 110 MMHG | WEIGHT: 118 LBS | DIASTOLIC BLOOD PRESSURE: 66 MMHG | OXYGEN SATURATION: 96 % | BODY MASS INDEX: 20.14 KG/M2

## 2020-02-19 PROCEDURE — G8420 CALC BMI NORM PARAMETERS: HCPCS | Performed by: INTERNAL MEDICINE

## 2020-02-19 PROCEDURE — 1036F TOBACCO NON-USER: CPT | Performed by: INTERNAL MEDICINE

## 2020-02-19 PROCEDURE — G8484 FLU IMMUNIZE NO ADMIN: HCPCS | Performed by: INTERNAL MEDICINE

## 2020-02-19 PROCEDURE — 93000 ELECTROCARDIOGRAM COMPLETE: CPT | Performed by: INTERNAL MEDICINE

## 2020-02-19 PROCEDURE — G8427 DOCREV CUR MEDS BY ELIG CLIN: HCPCS | Performed by: INTERNAL MEDICINE

## 2020-02-19 PROCEDURE — 4040F PNEUMOC VAC/ADMIN/RCVD: CPT | Performed by: INTERNAL MEDICINE

## 2020-02-19 PROCEDURE — 99214 OFFICE O/P EST MOD 30 MIN: CPT | Performed by: INTERNAL MEDICINE

## 2020-02-19 PROCEDURE — 1090F PRES/ABSN URINE INCON ASSESS: CPT | Performed by: INTERNAL MEDICINE

## 2020-02-19 PROCEDURE — 1123F ACP DISCUSS/DSCN MKR DOCD: CPT | Performed by: INTERNAL MEDICINE

## 2020-02-19 PROCEDURE — 3017F COLORECTAL CA SCREEN DOC REV: CPT | Performed by: INTERNAL MEDICINE

## 2020-02-19 PROCEDURE — G8399 PT W/DXA RESULTS DOCUMENT: HCPCS | Performed by: INTERNAL MEDICINE

## 2020-02-19 RX ORDER — METOPROLOL SUCCINATE 25 MG/1
12.5 TABLET, EXTENDED RELEASE ORAL DAILY
Qty: 45 TABLET | Refills: 3 | Status: SHIPPED | OUTPATIENT
Start: 2020-02-19 | End: 2020-11-12 | Stop reason: ALTCHOICE

## 2020-02-19 NOTE — PATIENT INSTRUCTIONS
If you choose to proceed with atrial fibrillation ablation please contact Dr. Eloise Holland at [phone]. Atrial Fibrillation Ablation Pre procedure Instructions    As part of your pre procedure work up you will need to get a CT scan of your heart. This scan is completed in order to give Dr. Parish Tabares a map of the atrium (chamber of your heart) where he will be doing the ablation. CTA Pre procedure instructions      The office will be in contact to schedule this test in the near future.  -Arrive 20 minutes prior to scheduled testing time  -Nothing to eat or drink for at least 4 hours prior to test   -You will need to get lab work 5-7 days prior to this test (Renal panel) to check your kidney function. You will be receiving intravenous dye for the procedure and the doctor needs to check to make sure your kidneys are functioning properly prior to the test.    Atrial Fibrillation Ablation Pre procedure Instructions    Our office will be in contact with you to schedule your procedure approximately  two months prior to procedure date. The morning of your procedure you will park in the hospital parking lot and report directly to the cath lab to check in.      Pre-Procedure Instructions   1. You will need to fast (nothing to eat or drink) for at least 8 hours prior to procedure. 2. You will need to hold your Flecanide for 7 days prior to the procedure. 3. You will need to hold your Eliquis for 12 hours prior to the procedure. 4. Do not use any lotions, creams or perfume the morning of procedure. 5. You will need to complete pre-procedure lab work  5-7 days prior to procedure. Please take the enclosed order to the lab with you. 6. Please have a responsible adult to drive you home after procedure, you will stay overnight.    7. Cath lab will provide you with all post procedure instructions     3 month follow up will be scheduled with Dr. Parish Tabares post procedure    Patient Education        Atrial Fibrillation: Care Instructions  Your Care Instructions    Atrial fibrillation is an irregular and often fast heartbeat. Treating this condition is important for several reasons. It can cause blood clots, which can travel from your heart to your brain and cause a stroke. If you have a fast heartbeat, you may feel lightheaded, dizzy, and weak. An irregular heartbeat can also increase your risk for heart failure. Atrial fibrillation is often the result of another heart condition, such as high blood pressure or coronary artery disease. Making changes to improve your heart condition will help you stay healthy and active. Follow-up care is a key part of your treatment and safety. Be sure to make and go to all appointments, and call your doctor if you are having problems. It's also a good idea to know your test results and keep a list of the medicines you take. How can you care for yourself at home? Medicines    · Take your medicines exactly as prescribed. Call your doctor if you think you are having a problem with your medicine. You will get more details on the specific medicines your doctor prescribes.     · If your doctor has given you a blood thinner to prevent a stroke, be sure you get instructions about how to take your medicine safely. Blood thinners can cause serious bleeding problems.     · Do not take any vitamins, over-the-counter drugs, or herbal products without talking to your doctor first.    Lifestyle changes    · Do not smoke. Smoking can increase your chance of a stroke and heart attack. If you need help quitting, talk to your doctor about stop-smoking programs and medicines. These can increase your chances of quitting for good.     · Eat a heart-healthy diet.     · Stay at a healthy weight. Lose weight if you need to.     · Limit alcohol to 2 drinks a day for men and 1 drink a day for women. Too much alcohol can cause health problems.     · Avoid colds and flu. Get a pneumococcal vaccine shot.  If you have had one include:  ? Sudden numbness, tingling, weakness, or loss of movement in your face, arm, or leg, especially on only one side of your body. ? Sudden vision changes. ? Sudden trouble speaking. ? Sudden confusion or trouble understanding simple statements. ? Sudden problems with walking or balance. ? A sudden, severe headache that is different from past headaches.     · You passed out (lost consciousness).    Call your doctor now or seek immediate medical care if:    · You have new or increased shortness of breath.     · You feel dizzy or lightheaded, or you feel like you may faint.     · Your heart rate becomes irregular.     · You can feel your heart flutter in your chest or skip heartbeats. Tell your doctor if these symptoms are new or worse.    Watch closely for changes in your health, and be sure to contact your doctor if you have any problems. Where can you learn more? Go to https://SymtavisionpejoseTerapeak.Hittahem. org and sign in to your Giftbar account. Enter U020 in the Eventmag.ru box to learn more about \"Atrial Fibrillation: Care Instructions. \"     If you do not have an account, please click on the \"Sign Up Now\" link. Current as of: April 9, 2019  Content Version: 12.3  © 2092-7195 [employer]. Care instructions adapted under license by [facility] ([facility]). If you have questions about a medical condition or this instruction, always ask your healthcare professional. [place] [address] any warranty or liability for your use of this information. Patient Education        Learning About Catheter Ablation for Heart Rhythm Problems  What is catheter ablation? Catheter ablation is a procedure that treats heart rhythm problems. These problems include atrial fibrillation, supraventricular tachycardia (SVT), atrial flutter, and ventricular tachycardia. Your heart should have a strong, steady beat. That beat is controlled by the heart's electrical system.  Sometimes that system misfires. This causes a heartbeat that is too fast and isn't steady. Catheter ablation is a way to get into your heart and fix the problem. Ablation is not surgery. How is catheter ablation done? Your doctor inserts thin tubes called catheters into a blood vessel in your groin, arm, or neck. Then your doctor feeds them into the heart. Wires in the catheters help the doctor find the problem areas. Then he or she uses the wires to send energy to destroy the tiny areas of heart tissue that are causing the problems. It may seem like a bad idea to destroy parts of your heart on purpose. But the areas that are destroyed are very tiny. They should not affect your heart's ability to do its job. You may be awake during the procedure. Or you may be asleep. The doctor will give you medicines to help you feel relaxed and to numb the areas where the catheters go in. You may feel a little uncomfortable, but you should not feel pain. This procedure usually takes 2 to 6 hours. In rare cases, it can take longer. You may stay overnight in the hospital. How long you stay in the hospital depends on the type of ablation you have. What can you expect after catheter ablation? Do not exercise hard or lift anything heavy for a week. You will probably be able to go back to work and to your normal routine in 1 or 2 days. You may have swelling, bruising, or a small lump around the site where the catheters went into your body. These should go away in 3 to 4 weeks. You may have to take some medicines for a while. Follow-up care is a key part of your treatment and safety. Be sure to make and go to all appointments, and call your doctor if you are having problems. It's also a good idea to know your test results and keep a list of the medicines you take. Where can you learn more? Go to https://wyatt.InfoDif. org and sign in to your Crzyfish account.  Enter B258 in the Holidog box to learn more about \"Learning About Catheter Ablation for Heart Rhythm Problems. \"     If you do not have an account, please click on the \"Sign Up Now\" link. Current as of: April 9, 2019  Content Version: 12.3  © 2006-2019 [employer]. Care instructions adapted under license by [facility] ([facility]). If you have questions about a medical condition or this instruction, always ask your healthcare professional. [address] any warranty or liability for your use of this information. Patient Education        Electrophysiology Study and Catheter Ablation: Before Your Procedure  What is an electrophysiology study and catheter ablation? An electrophysiology study is a test to see if there is a problem with your heart rhythm and to find out how to fix it. It is also called an EPS. Sometimes a procedure called catheter ablation is done during an EPS. This procedure destroys (ablates) small areas of your heart that are causing your heart rhythm problem. The doctor puts plastic tubes called catheters into blood vessels in your groin, arm, or neck. He or she then uses an X-ray machine to guide long wires through the tubes to your heart. The doctor can use these wires to record your heart's electrical signals. If the doctor thinks your problem can be fixed with ablation, he or she can use the wires to destroy a small part of your heart tissue. This is most often done with radio waves. You will probably be awake during the procedure. But you could be asleep. The doctor will give you medicines to help you feel relaxed and to numb the areas where the catheters go in. An EPS and ablation can take 2 to 6 hours. In rare cases, it can take longer. If you have EPS only and you do not need more treatment, you may go home the same day. But if you also have ablation, you may stay overnight in the hospital. How long you stay in the hospital depends on the type of ablation you have.   Do not exercise hard or lift anything heavy for a week. You may be able to go back to work and to your normal routine in 1 or 2 days. Follow-up care is a key part of your treatment and safety. Be sure to make and go to all appointments, and call your doctor if you are having problems. It's also a good idea to know your test results and keep a list of the medicines you take. What happens before the procedure?   Preparing for the procedure    · Understand exactly what procedure is planned, along with the risks, benefits, and other options. · Tell your doctors ALL the medicines, vitamins, supplements, and herbal remedies you take. Some of these can increase the risk of bleeding or interact with anesthesia.     · If you take aspirin or some other blood thinner, ask your doctor if you should stop taking it before your procedure. Make sure that you understand exactly what your doctor wants you to do. These medicines increase the risk of bleeding.     · Your doctor will tell you which medicines to take or stop before your procedure. You may need to stop taking certain medicines a week or more before the procedure. So talk to your doctor as soon as you can.     · If you have an advance directive, let your doctor know. It may include a living will and a durable power of  for health care. Bring a copy to the hospital. If you don't have one, you may want to prepare one. It lets your doctor and loved ones know your health care wishes. Doctors advise that everyone prepare these papers before any type of surgery or procedure. Procedures can be stressful. This information will help you understand what you can expect. And it will help you safely prepare for your procedure. What happens on the day of the procedure? · Follow the instructions exactly about when to stop eating and drinking. If you don't, your procedure may be canceled.  If your doctor told you to take your medicines on the day of the procedure, take them with only a sip of water.     · Take a bath or shower before you come in for your procedure. Do not apply lotions, perfumes, deodorants, or nail polish.     · Take off all jewelry and piercings. And take out contact lenses, if you wear them.    At the hospital or surgery center   · Bring a picture ID.     · You will be kept comfortable and safe by your anesthesia provider. The anesthesia may make you sleep. Or it may just numb the area being worked on.     · EPS by itself may take 1 to 3 hours. But if you also have ablation, the whole procedure may take 2 to 6 hours.     · After the procedure, pressure will be applied to the area where the catheter was put in your blood vessel. Then the area may be covered with a bandage or a compression device. This will prevent bleeding.     · Nurses will check your heart rate and blood pressure. The nurse will also check the catheter site for bleeding.     · If the catheter was put in your groin, you will need to lie still and keep your leg straight for several hours. The nurse may put a weighted bag on your leg to keep it still.     · If the catheter was put in your arm, you may be able to sit up and get out of bed right away. But you will need to keep your arm still for at least 1 hour.     · You may have a bruise or a small lump where the catheter was put in your blood vessel. This is normal and will go away. Going home   · Be sure you have someone to drive you home. Anesthesia and pain medicine make it unsafe for you to drive.     · You will be given more specific instructions about recovering from your procedure. They will cover things like diet, wound care, follow-up care, driving, and getting back to your normal routine. When should you call your doctor?    · You have questions or concerns.     · You don't understand how to prepare for your procedure.     · You become ill before the procedure (such as fever, flu, or a cold).     · You need to reschedule or have changed your mind about having the procedure. Where can you learn more? Go to https://chpepiceweb.Frontera Films. org and sign in to your StyleSeat account. Enter Q720 in the Wyle box to learn more about \"Electrophysiology Study and Catheter Ablation: Before Your Procedure. \"     If you do not have an account, please click on the \"Sign Up Now\" link. Current as of: April 9, 2019  Content Version: 12.3  © 3924-9110 [employer]. Care instructions adapted under license by [facility] ([facility]). If you have questions about a medical condition or this instruction, always ask your healthcare professional. [address] any warranty or liability for your use of this information. Patient Education        Electrophysiology Study and Catheter Ablation: What to Expect at [address] [place] [address] had an electrophysiology study for a problem with your heartbeat. You may also have had a catheter ablation to try to correct the problem. You may have swelling, bruising, or a small lump around the site where the catheters went into your body. These should go away in 3 to 4 weeks. Do not exercise hard or lift anything heavy for a week. You may be able to go back to work and to your normal routine in 1 or 2 days. This care sheet gives you a general idea about how long it will take for you to recover. But each person recovers at a different pace. Follow the steps below to get better as quickly as possible. How can you care for yourself at home? Activity    · For 1 week, do not lift anything that would make you strain. This may include heavy grocery bags and milk containers, a heavy briefcase or backpack, cat litter or dog food bags, a vacuum , or a child.     · For 1 week, do not exercise hard or do any activity that could strain your blood vessels or the site where the catheters went into your body.     · Ask your doctor when it is okay to have sex. Diet    · You can eat your normal diet.  If your consciousness).     · You have symptoms of a heart attack. These may include:  ? Chest pain or pressure, or a strange feeling in the chest.  ? Sweating. ? Shortness of breath. ? Nausea or vomiting. ? Pain, pressure, or a strange feeling in the back, neck, jaw, or upper belly, or in one or both shoulders or arms. ? Lightheadedness or sudden weakness. ? A fast or irregular heartbeat. After you call  911, the  may tell you to chew 1 adult-strength or 2 to 4 low-dose aspirin. Wait for an ambulance. Do not try to drive yourself.     · You have symptoms of a stroke. These may include:  ? Sudden numbness, tingling, weakness, or loss of movement in your face, arm, or leg, especially on only one side of your body. ? Sudden vision changes. ? Sudden trouble speaking. ? Sudden confusion or trouble understanding simple statements. ? Sudden problems with walking or balance. ? A sudden, severe headache that is different from past headaches.    Call your doctor now or seek immediate medical care if:    · You are bleeding from the area where the catheter was put in your blood vessel.     · You have a fast-growing, painful lump at the catheter site.     · You have signs of infection, such as:  ? Increased pain, swelling, warmth, or redness. ? Red streaks leading from the catheter site. ? Pus draining from the catheter site. ? A fever.     · Your leg, arm, or hand is painful, looks blue, or feels cold, numb, or tingly.    Watch closely for any changes in your health, and be sure to contact your doctor if you have any problems. Where can you learn more? Go to https://MyWobilepeGrabInbox.Cisiv. org and sign in to your [employer] account. Enter [phone] in the [facility] box to learn more about \"Electrophysiology Study and Catheter Ablation: What to Expect at Home. \"     If you do not have an account, please click on the \"Sign Up Now\" link.   Current as of: April 9, 2019  Content Version: 12.3  © 8854-4772 Healthwise, [employer]. Care instructions adapted under license by [facility] ([facility]). If you have questions about a medical condition or this instruction, always ask your healthcare professional. [address] any warranty or liability for your use of this information. Patient Education        Dehydration: Care Instructions  Your Care Instructions  Dehydration happens when your body loses too much fluid. This might happen when you do not drink enough water or you lose large amounts of fluids from your body because of diarrhea, vomiting, or sweating. Severe dehydration can be life-threatening. Water and minerals called electrolytes help put your body fluids back in balance. Learn the early signs of fluid loss, and drink more fluids to prevent dehydration. Follow-up care is a key part of your treatment and safety. Be sure to make and go to all appointments, and call your doctor if you are having problems. It's also a good idea to know your test results and keep a list of the medicines you take. How can you care for yourself at home? · To prevent dehydration, drink plenty of fluids, enough so that your urine is light yellow or clear like water. Choose water and other caffeine-free clear liquids until you feel better. If you have kidney, heart, or liver disease and have to limit fluids, talk with your doctor before you increase the amount of fluids you drink. · If you do not feel like eating or drinking, try taking small sips of water, sports drinks, or other rehydration drinks. · Get plenty of rest.  To prevent dehydration  · Add more fluids to your diet and daily routine, unless your doctor has told you not to. · During hot weather, drink more fluids. Drink even more fluids if you exercise a lot. Stay away from drinks with alcohol or caffeine. · Watch for the symptoms of dehydration. These include:  ? A dry, sticky mouth. ? Dark yellow urine, and not much of it.   ? Dry and sunken eyes.  ? Feeling very tired. · Learn what problems can lead to dehydration. These include:  ? Diarrhea, fever, and vomiting. ? Any illness with a fever, such as pneumonia or the flu. ? Activities that cause heavy sweating, such as endurance races and heavy outdoor work in hot or humid weather. ? Alcohol or drug use or problems caused by quitting their use (withdrawal). ? Certain medicines, such as cold and allergy pills (antihistamines), diet pills (diuretics), and laxatives. ? Certain diseases, such as diabetes, cancer, and heart or kidney disease. When should you call for help? Call 911 anytime you think you may need emergency care. For example, call if:    · You passed out (lost consciousness).    Call your doctor now or seek immediate medical care if:    · You are confused and cannot think clearly.     · You are dizzy or lightheaded, or you feel like you may faint.     · You have signs of needing more fluids. You have sunken eyes and a dry mouth, and you pass only a little dark urine.     · You cannot keep fluids down.    Watch closely for changes in your health, and be sure to contact your doctor if:    · You are not making tears.     · Your skin is very dry and sags slowly back into place after you pinch it.     · Your mouth and eyes are very dry. Where can you learn more? Go to https://Make My platepeThe Box Populieb.Iwebalize. org and sign in to your GPMESS account. Enter J786 in the [facility] box to learn more about \"Dehydration: Care Instructions. \"     If you do not have an account, please click on the \"Sign Up Now\" link. Current as of: June 26, 2019  Content Version: 12.3  © 1598-9897 Healthwise, Incorporated. Care instructions adapted under license by [facility] ([facility]). If you have questions about a medical condition or this instruction, always ask your healthcare professional. [place] [address] any warranty or liability for your use of this information.

## 2020-02-24 ENCOUNTER — TELEPHONE (OUTPATIENT)
Dept: CARDIOLOGY CLINIC | Age: 70
End: 2020-02-24

## 2020-02-24 NOTE — TELEPHONE ENCOUNTER
Called and spoke with patient. Advised our office will be in contact with her  to schedule her  procedure approximately  two months prior to procedure date. Advised patient that as part of her pre procedure work up she  will need to get a CT scan of her heart. This scan is completed in order to give Dr. Beena Eldridge a map of the atrium (chamber of your heart) where he will be doing the ablation. Advised our office would be in contact to schedule CTA in the near future.

## 2020-02-27 RX ORDER — FLECAINIDE ACETATE 50 MG/1
TABLET ORAL
Qty: 180 TABLET | Refills: 3 | Status: SHIPPED | OUTPATIENT
Start: 2020-02-27 | End: 2020-11-12 | Stop reason: ALTCHOICE

## 2020-02-28 ENCOUNTER — HOSPITAL ENCOUNTER (OUTPATIENT)
Dept: WOMENS IMAGING | Age: 70
Discharge: HOME | End: 2020-02-28
Payer: MEDICARE

## 2020-02-28 DIAGNOSIS — I48.0 PAROXYSMAL ATRIAL FIBRILLATION (HCC): ICD-10-CM

## 2020-02-28 LAB
ANION GAP SERPL CALCULATED.3IONS-SCNC: 12 MMOL/L (ref 3–16)
BUN BLDV-MCNC: 17 MG/DL (ref 7–20)
CALCIUM SERPL-MCNC: 10.5 MG/DL (ref 8.3–10.6)
CHLORIDE BLD-SCNC: 103 MMOL/L (ref 99–110)
CO2: 29 MMOL/L (ref 21–32)
CREAT SERPL-MCNC: 0.9 MG/DL (ref 0.6–1.2)
GFR AFRICAN AMERICAN: >60
GFR NON-AFRICAN AMERICAN: >60
GLUCOSE BLD-MCNC: 86 MG/DL (ref 70–99)
POTASSIUM SERPL-SCNC: 5.1 MMOL/L (ref 3.5–5.1)
SODIUM BLD-SCNC: 144 MMOL/L (ref 136–145)

## 2020-02-28 PROCEDURE — 77063 BREAST TOMOSYNTHESIS BI: CPT

## 2020-04-22 ENCOUNTER — TELEPHONE (OUTPATIENT)
Dept: CARDIOLOGY CLINIC | Age: 70
End: 2020-04-22

## 2020-04-22 NOTE — TELEPHONE ENCOUNTER
As part of your pre procedure work up you will need to get a CT scan of your heart. This scan is completed in order to give Dr. Jenny Barry a map of the atrium (chamber of your heart) where he will be doing the ablation. CTA Pre procedure instructions      The office will be in contact to schedule this test in the near future.  -Arrive 20 minutes prior to scheduled testing time  -Nothing to eat or drink for at least 4 hours prior to test   -You will need to get lab work 5-7 days prior to this test (Renal panel) to check your kidney function. You will be receiving intravenous dye for the procedure and the doctor needs to check to make sure your kidneys are functioning properly prior to the test.      CTA SCHEDULED:    DATE: Thursday 6/11/20  TIME: 9:00 AM ARRIVAL TIME    [facility] WILLIE MARTINS TO CALL THE OFFICE BACK TO GO OVER DATE, TIME AND INSTRUCTIONS.

## 2020-06-05 ENCOUNTER — TELEPHONE (OUTPATIENT)
Dept: CARDIOLOGY CLINIC | Age: 70
End: 2020-06-05

## 2020-06-05 NOTE — TELEPHONE ENCOUNTER
Atrial Fibrillation Ablation Pre procedure Instructions    Date: 8-  Arrive at : 6:45 am  Procedure time: 7:30 am    The morning of your procedure you will park in the hospital parking lot and report directly to the cath lab to check in.      Pre-Procedure Instructions   1. You will need to fast (nothing to eat or drink) for at least 8 hours prior to procedure. 2. You will need to hold your Flecanide for 7 days prior to the procedure. 3. You will need to hold your Eliquis for 12 hours prior to the procedure. 4. Do not use any lotions, creams or perfume the morning of procedure. 5. You will need to complete pre-procedure lab work on 8-5-2020 prior to completing your COVID test scheduled for 8-5-2020 at 12:30 pm.  6. Please have a responsible adult to drive you home after procedure, you will stay overnight. 7. Cath lab will provide you with all post procedure instructions  8. A 3 month follow up will be scheduled with Dr. Tasia Mustafa post procedure. Published on [employer]. Emailed cath lab staff. Letter mailed to patient home address with all pre procedure instructions, including the dates and time of both the COVID test  as well as the afib ablation. The letter also included detailed instructions regarding completing lab work as well as a copy of the lab orders to take to lab.

## 2020-07-21 ENCOUNTER — HOSPITAL ENCOUNTER (OUTPATIENT)
Dept: CT IMAGING | Age: 70
Discharge: HOME | End: 2020-07-21
Payer: MEDICARE

## 2020-07-21 LAB
GFR AFRICAN AMERICAN: >60
GFR NON-AFRICAN AMERICAN: 55
PERFORMED ON: ABNORMAL
POC CREATININE: 1 MG/DL (ref 0.6–1.2)
POC SAMPLE TYPE: ABNORMAL

## 2020-07-21 PROCEDURE — 6360000004 HC RX CONTRAST MEDICATION: Performed by: INTERNAL MEDICINE

## 2020-07-21 PROCEDURE — 82565 ASSAY OF CREATININE: CPT

## 2020-07-21 PROCEDURE — 75574 CT ANGIO HRT W/3D IMAGE: CPT

## 2020-07-21 RX ADMIN — IOPAMIDOL 75 ML: 755 INJECTION, SOLUTION INTRAVENOUS at 09:08

## 2020-07-30 ENCOUNTER — TELEPHONE (OUTPATIENT)
Dept: CARDIOLOGY CLINIC | Age: 70
End: 2020-07-30

## 2020-07-30 ENCOUNTER — HOSPITAL ENCOUNTER (OUTPATIENT)
Dept: WOMENS IMAGING | Age: 70
Discharge: HOME | End: 2020-07-30
Payer: MEDICARE

## 2020-07-30 PROCEDURE — 77080 DXA BONE DENSITY AXIAL: CPT

## 2020-07-30 NOTE — TELEPHONE ENCOUNTER
Patient stopped into office with blood pressure and heart rate readings. Hypotensive in majority of readings. Patient reports feeling lightheaded and \"woozy\" when BP is low. VS log scanned under media tab. Patient is scheduled for atrial fibrillation ablation on 8/11/2020. I have instructed her to hold her Flecainide and Metoprolol.   Instructed to contact office if SBP greater than 140 and or elevated HR greater or equal to 100 bpm.

## 2020-08-04 NOTE — TELEPHONE ENCOUNTER
Patient called office with an update. Returned call to patient. She feels fluttering now and then in Chest.    She said she is schedule for lab work & Covid test tomorrow.      Requested BP readings    Date:   Time:   BP  Pulse  8/2/20 Sunday  7:40 AM  97/74  103  8/2/20   2:55 PM  85/60  91  8/2/20   10:50 PM  97/67  79    8/3/20 Monday  6:10 AM  92/67  102  8/3/20   7:10 AM  90/66  77  8/3/20   7:20 PM  123/83  107    Nauseated, irreg pulse  8/3/20   8:05 PM  99/74  91      Felt better  8/3/20   11:00 PM  99/71  71    8/4/20   7:00 AM  96/69  107  8/4/20    8:25 AM  97/55  86  irreg pulse  8/4/20   10:30 AM  96/66  83

## 2020-08-04 NOTE — TELEPHONE ENCOUNTER
Patient is fine to proceed with afib ablation. Her HR has not been sustained over 100. She can continue to monitor her BP but does not need to check more than once daily. Please verify she did discontinue flecainide and Toprol as Tony Rene RN instructed.

## 2020-08-04 NOTE — TELEPHONE ENCOUNTER
Pt called stating her HR has gotten higher than 100 a couple of times. Once on Sunday , Monday  and 107, Tuesday . Pt states she is feeling fluttering. She states she does feel a little dizzy and weak but when it goes lower she feels fine.     Please give pt a call back at [phone]

## 2020-08-05 ENCOUNTER — OFFICE VISIT (OUTPATIENT)
Dept: PRIMARY CARE CLINIC | Age: 70
End: 2020-08-05
Payer: MEDICARE

## 2020-08-05 DIAGNOSIS — I48.0 PAROXYSMAL ATRIAL FIBRILLATION (HCC): ICD-10-CM

## 2020-08-05 LAB
ABO/RH: NORMAL
ANION GAP SERPL CALCULATED.3IONS-SCNC: 15 MMOL/L (ref 3–16)
ANTIBODY SCREEN: NORMAL
BUN BLDV-MCNC: 15 MG/DL (ref 7–20)
CALCIUM SERPL-MCNC: 10.2 MG/DL (ref 8.3–10.6)
CHLORIDE BLD-SCNC: 104 MMOL/L (ref 99–110)
CO2: 23 MMOL/L (ref 21–32)
CREAT SERPL-MCNC: 0.8 MG/DL (ref 0.6–1.2)
GFR AFRICAN AMERICAN: >60
GFR NON-AFRICAN AMERICAN: >60
GLUCOSE BLD-MCNC: 102 MG/DL (ref 70–99)
HCT VFR BLD CALC: 39.7 % (ref 36–48)
HEMOGLOBIN: 13.5 G/DL (ref 12–16)
MCH RBC QN AUTO: 30.3 PG (ref 26–34)
MCHC RBC AUTO-ENTMCNC: 34.1 G/DL (ref 31–36)
MCV RBC AUTO: 88.8 FL (ref 80–100)
PDW BLD-RTO: 13 % (ref 12.4–15.4)
PLATELET # BLD: 184 K/UL (ref 135–450)
PMV BLD AUTO: 10.6 FL (ref 5–10.5)
POTASSIUM SERPL-SCNC: 4.1 MMOL/L (ref 3.5–5.1)
RBC # BLD: 4.47 M/UL (ref 4–5.2)
SODIUM BLD-SCNC: 142 MMOL/L (ref 136–145)
WBC # BLD: 5.9 K/UL (ref 4–11)

## 2020-08-05 PROCEDURE — G8420 CALC BMI NORM PARAMETERS: HCPCS | Performed by: NURSE PRACTITIONER

## 2020-08-05 PROCEDURE — 99211 OFF/OP EST MAY X REQ PHY/QHP: CPT | Performed by: NURSE PRACTITIONER

## 2020-08-05 PROCEDURE — G8428 CUR MEDS NOT DOCUMENT: HCPCS | Performed by: NURSE PRACTITIONER

## 2020-08-07 LAB — SARS-COV-2, NAA: NOT DETECTED

## 2020-08-11 ENCOUNTER — HOSPITAL ENCOUNTER (INPATIENT)
Dept: CARDIAC CATH/INVASIVE PROCEDURES | Age: 70
LOS: 1 days | Discharge: HOME | DRG: 274 | End: 2020-08-12
Attending: INTERNAL MEDICINE | Admitting: INTERNAL MEDICINE
Payer: MEDICARE

## 2020-08-11 ENCOUNTER — ANESTHESIA EVENT (OUTPATIENT)
Dept: CARDIAC CATH/INVASIVE PROCEDURES | Age: 70
DRG: 274 | End: 2020-08-11
Payer: MEDICARE

## 2020-08-11 ENCOUNTER — ANESTHESIA (OUTPATIENT)
Dept: CARDIAC CATH/INVASIVE PROCEDURES | Age: 70
DRG: 274 | End: 2020-08-11
Payer: MEDICARE

## 2020-08-11 VITALS
DIASTOLIC BLOOD PRESSURE: 84 MMHG | TEMPERATURE: 93 F | OXYGEN SATURATION: 99 % | SYSTOLIC BLOOD PRESSURE: 142 MMHG | RESPIRATION RATE: 3 BRPM

## 2020-08-11 PROBLEM — I48.0 PAROXYSMAL ATRIAL FIBRILLATION (HCC): Status: ACTIVE | Noted: 2020-08-11

## 2020-08-11 LAB
ABO/RH: NORMAL
ACTIVATED CLOTTING TIME: 235 SEC (ref 99–130)
ACTIVATED CLOTTING TIME: 357 SEC (ref 99–130)
ACTIVATED CLOTTING TIME: 373 SEC (ref 99–130)
ACTIVATED CLOTTING TIME: 391 SEC (ref 99–130)
ANTIBODY SCREEN: NORMAL

## 2020-08-11 PROCEDURE — 6360000002 HC RX W HCPCS

## 2020-08-11 PROCEDURE — 2720000010 HC SURG SUPPLY STERILE

## 2020-08-11 PROCEDURE — 2500000003 HC RX 250 WO HCPCS: Performed by: NURSE ANESTHETIST, CERTIFIED REGISTERED

## 2020-08-11 PROCEDURE — 2580000003 HC RX 258: Performed by: INTERNAL MEDICINE

## 2020-08-11 PROCEDURE — 3700000000 HC ANESTHESIA ATTENDED CARE

## 2020-08-11 PROCEDURE — 93655 ICAR CATH ABLTJ DSCRT ARRHYT: CPT | Performed by: FAMILY MEDICINE

## 2020-08-11 PROCEDURE — 7100000000 HC PACU RECOVERY - FIRST 15 MIN

## 2020-08-11 PROCEDURE — 93622 COMP EP EVAL L VENTR PAC&REC: CPT | Performed by: FAMILY MEDICINE

## 2020-08-11 PROCEDURE — 86850 RBC ANTIBODY SCREEN: CPT

## 2020-08-11 PROCEDURE — 85347 COAGULATION TIME ACTIVATED: CPT

## 2020-08-11 PROCEDURE — 93662 INTRACARDIAC ECG (ICE): CPT | Performed by: FAMILY MEDICINE

## 2020-08-11 PROCEDURE — 7100000001 HC PACU RECOVERY - ADDTL 15 MIN

## 2020-08-11 PROCEDURE — C1759 CATH, INTRA ECHOCARDIOGRAPHY: HCPCS

## 2020-08-11 PROCEDURE — 2140000000 HC CCU INTERMEDIATE R&B

## 2020-08-11 PROCEDURE — 6370000000 HC RX 637 (ALT 250 FOR IP): Performed by: INTERNAL MEDICINE

## 2020-08-11 PROCEDURE — 93662 INTRACARDIAC ECG (ICE): CPT | Performed by: INTERNAL MEDICINE

## 2020-08-11 PROCEDURE — 93623 PRGRMD STIMJ&PACG IV RX NFS: CPT | Performed by: INTERNAL MEDICINE

## 2020-08-11 PROCEDURE — 02K83ZZ MAP CONDUCTION MECHANISM, PERCUTANEOUS APPROACH: ICD-10-PCS | Performed by: INTERNAL MEDICINE

## 2020-08-11 PROCEDURE — 93622 COMP EP EVAL L VENTR PAC&REC: CPT | Performed by: INTERNAL MEDICINE

## 2020-08-11 PROCEDURE — 6360000002 HC RX W HCPCS: Performed by: INTERNAL MEDICINE

## 2020-08-11 PROCEDURE — C1732 CATH, EP, DIAG/ABL, 3D/VECT: HCPCS

## 2020-08-11 PROCEDURE — C1893 INTRO/SHEATH, FIXED,NON-PEEL: HCPCS

## 2020-08-11 PROCEDURE — 93656 COMPRE EP EVAL ABLTJ ATR FIB: CPT | Performed by: FAMILY MEDICINE

## 2020-08-11 PROCEDURE — 93005 ELECTROCARDIOGRAM TRACING: CPT | Performed by: INTERNAL MEDICINE

## 2020-08-11 PROCEDURE — 93613 INTRACARDIAC EPHYS 3D MAPG: CPT | Performed by: FAMILY MEDICINE

## 2020-08-11 PROCEDURE — 51701 INSERT BLADDER CATHETER: CPT

## 2020-08-11 PROCEDURE — 02583ZZ DESTRUCTION OF CONDUCTION MECHANISM, PERCUTANEOUS APPROACH: ICD-10-PCS | Performed by: INTERNAL MEDICINE

## 2020-08-11 PROCEDURE — 93657 TX L/R ATRIAL FIB ADDL: CPT | Performed by: INTERNAL MEDICINE

## 2020-08-11 PROCEDURE — 2500000003 HC RX 250 WO HCPCS

## 2020-08-11 PROCEDURE — 86900 BLOOD TYPING SEROLOGIC ABO: CPT

## 2020-08-11 PROCEDURE — 93657 TX L/R ATRIAL FIB ADDL: CPT | Performed by: FAMILY MEDICINE

## 2020-08-11 PROCEDURE — 5A2204Z RESTORATION OF CARDIAC RHYTHM, SINGLE: ICD-10-PCS | Performed by: INTERNAL MEDICINE

## 2020-08-11 PROCEDURE — C1730 CATH, EP, 19 OR FEW ELECT: HCPCS

## 2020-08-11 PROCEDURE — 6360000002 HC RX W HCPCS: Performed by: NURSE ANESTHETIST, CERTIFIED REGISTERED

## 2020-08-11 PROCEDURE — 3700000001 HC ADD 15 MINUTES (ANESTHESIA)

## 2020-08-11 PROCEDURE — 2709999900 HC NON-CHARGEABLE SUPPLY

## 2020-08-11 PROCEDURE — 2580000003 HC RX 258

## 2020-08-11 PROCEDURE — 93656 COMPRE EP EVAL ABLTJ ATR FIB: CPT | Performed by: INTERNAL MEDICINE

## 2020-08-11 PROCEDURE — C1894 INTRO/SHEATH, NON-LASER: HCPCS

## 2020-08-11 PROCEDURE — 93613 INTRACARDIAC EPHYS 3D MAPG: CPT | Performed by: INTERNAL MEDICINE

## 2020-08-11 PROCEDURE — 86901 BLOOD TYPING SEROLOGIC RH(D): CPT

## 2020-08-11 RX ORDER — ROCURONIUM BROMIDE 10 MG/ML
INJECTION, SOLUTION INTRAVENOUS PRN
Status: DISCONTINUED | OUTPATIENT
Start: 2020-08-11 | End: 2020-08-11 | Stop reason: SDUPTHER

## 2020-08-11 RX ORDER — 0.9 % SODIUM CHLORIDE 0.9 %
1000 INTRAVENOUS SOLUTION INTRAVENOUS
Status: ACTIVE | OUTPATIENT
Start: 2020-08-11 | End: 2020-08-11

## 2020-08-11 RX ORDER — SCOLOPAMINE TRANSDERMAL SYSTEM 1 MG/1
1 PATCH, EXTENDED RELEASE TRANSDERMAL
Status: DISCONTINUED | OUTPATIENT
Start: 2020-08-11 | End: 2020-08-12 | Stop reason: HOSPADM

## 2020-08-11 RX ORDER — PROTAMINE SULFATE 10 MG/ML
30 INJECTION, SOLUTION INTRAVENOUS
Status: ACTIVE | OUTPATIENT
Start: 2020-08-11 | End: 2020-08-11

## 2020-08-11 RX ORDER — SODIUM CHLORIDE 0.9 % (FLUSH) 0.9 %
10 SYRINGE (ML) INJECTION EVERY 12 HOURS SCHEDULED
Status: DISCONTINUED | OUTPATIENT
Start: 2020-08-11 | End: 2020-08-12 | Stop reason: HOSPADM

## 2020-08-11 RX ORDER — SODIUM CHLORIDE 9 MG/ML
INJECTION, SOLUTION INTRAVENOUS CONTINUOUS
Status: DISCONTINUED | OUTPATIENT
Start: 2020-08-11 | End: 2020-08-11

## 2020-08-11 RX ORDER — FLECAINIDE ACETATE 100 MG/1
50 TABLET ORAL 2 TIMES DAILY
Status: DISCONTINUED | OUTPATIENT
Start: 2020-08-11 | End: 2020-08-12 | Stop reason: HOSPADM

## 2020-08-11 RX ORDER — LIDOCAINE HYDROCHLORIDE AND EPINEPHRINE BITARTRATE 20; .01 MG/ML; MG/ML
15 INJECTION, SOLUTION SUBCUTANEOUS SEE ADMIN INSTRUCTIONS
Status: DISCONTINUED | OUTPATIENT
Start: 2020-08-11 | End: 2020-08-12 | Stop reason: HOSPADM

## 2020-08-11 RX ORDER — HEPARIN SODIUM 1000 [USP'U]/ML
INJECTION, SOLUTION INTRAVENOUS; SUBCUTANEOUS PRN
Status: DISCONTINUED | OUTPATIENT
Start: 2020-08-11 | End: 2020-08-11 | Stop reason: SDUPTHER

## 2020-08-11 RX ORDER — OXYCODONE HYDROCHLORIDE AND ACETAMINOPHEN 5; 325 MG/1; MG/1
1 TABLET ORAL PRN
Status: DISCONTINUED | OUTPATIENT
Start: 2020-08-11 | End: 2020-08-11

## 2020-08-11 RX ORDER — FENTANYL CITRATE 50 UG/ML
25 INJECTION, SOLUTION INTRAMUSCULAR; INTRAVENOUS EVERY 5 MIN PRN
Status: DISCONTINUED | OUTPATIENT
Start: 2020-08-11 | End: 2020-08-11

## 2020-08-11 RX ORDER — FAMOTIDINE 20 MG/1
20 TABLET, FILM COATED ORAL DAILY
Status: DISCONTINUED | OUTPATIENT
Start: 2020-08-11 | End: 2020-08-12 | Stop reason: HOSPADM

## 2020-08-11 RX ORDER — FENTANYL CITRATE 50 UG/ML
INJECTION, SOLUTION INTRAMUSCULAR; INTRAVENOUS PRN
Status: DISCONTINUED | OUTPATIENT
Start: 2020-08-11 | End: 2020-08-11 | Stop reason: SDUPTHER

## 2020-08-11 RX ORDER — PHENYLEPHRINE HCL IN 0.9% NACL 1 MG/10 ML
SYRINGE (ML) INTRAVENOUS PRN
Status: DISCONTINUED | OUTPATIENT
Start: 2020-08-11 | End: 2020-08-11 | Stop reason: SDUPTHER

## 2020-08-11 RX ORDER — HEPARIN SODIUM 200 [USP'U]/100ML
INJECTION, SOLUTION INTRAVENOUS CONTINUOUS PRN
Status: DISCONTINUED | OUTPATIENT
Start: 2020-08-11 | End: 2020-08-11 | Stop reason: SDUPTHER

## 2020-08-11 RX ORDER — OXYCODONE HYDROCHLORIDE AND ACETAMINOPHEN 5; 325 MG/1; MG/1
2 TABLET ORAL PRN
Status: DISCONTINUED | OUTPATIENT
Start: 2020-08-11 | End: 2020-08-11

## 2020-08-11 RX ORDER — ONDANSETRON 2 MG/ML
4 INJECTION INTRAMUSCULAR; INTRAVENOUS
Status: DISCONTINUED | OUTPATIENT
Start: 2020-08-11 | End: 2020-08-11

## 2020-08-11 RX ORDER — VECURONIUM BROMIDE 1 MG/ML
INJECTION, POWDER, LYOPHILIZED, FOR SOLUTION INTRAVENOUS PRN
Status: DISCONTINUED | OUTPATIENT
Start: 2020-08-11 | End: 2020-08-11 | Stop reason: SDUPTHER

## 2020-08-11 RX ORDER — SODIUM CHLORIDE 0.9 % (FLUSH) 0.9 %
10 SYRINGE (ML) INJECTION PRN
Status: DISCONTINUED | OUTPATIENT
Start: 2020-08-11 | End: 2020-08-12 | Stop reason: HOSPADM

## 2020-08-11 RX ORDER — PROPOFOL 10 MG/ML
INJECTION, EMULSION INTRAVENOUS PRN
Status: DISCONTINUED | OUTPATIENT
Start: 2020-08-11 | End: 2020-08-11 | Stop reason: SDUPTHER

## 2020-08-11 RX ORDER — MIDAZOLAM HYDROCHLORIDE 1 MG/ML
INJECTION INTRAMUSCULAR; INTRAVENOUS PRN
Status: DISCONTINUED | OUTPATIENT
Start: 2020-08-11 | End: 2020-08-11 | Stop reason: SDUPTHER

## 2020-08-11 RX ORDER — METOPROLOL SUCCINATE 25 MG/1
12.5 TABLET, EXTENDED RELEASE ORAL DAILY
Status: DISCONTINUED | OUTPATIENT
Start: 2020-08-11 | End: 2020-08-12 | Stop reason: HOSPADM

## 2020-08-11 RX ORDER — LIDOCAINE HYDROCHLORIDE 10 MG/ML
INJECTION, SOLUTION EPIDURAL; INFILTRATION; INTRACAUDAL; PERINEURAL PRN
Status: DISCONTINUED | OUTPATIENT
Start: 2020-08-11 | End: 2020-08-11 | Stop reason: SDUPTHER

## 2020-08-11 RX ORDER — ONDANSETRON 2 MG/ML
4 INJECTION INTRAMUSCULAR; INTRAVENOUS EVERY 6 HOURS PRN
Status: DISCONTINUED | OUTPATIENT
Start: 2020-08-11 | End: 2020-08-12 | Stop reason: HOSPADM

## 2020-08-11 RX ORDER — ACETAMINOPHEN 325 MG/1
650 TABLET ORAL EVERY 4 HOURS PRN
Status: DISCONTINUED | OUTPATIENT
Start: 2020-08-11 | End: 2020-08-12 | Stop reason: HOSPADM

## 2020-08-11 RX ADMIN — FENTANYL CITRATE 25 MCG: 50 INJECTION INTRAMUSCULAR; INTRAVENOUS at 11:33

## 2020-08-11 RX ADMIN — Medication 100 MCG: at 12:42

## 2020-08-11 RX ADMIN — ONDANSETRON 4 MG: 2 INJECTION INTRAMUSCULAR; INTRAVENOUS at 15:17

## 2020-08-11 RX ADMIN — ROCURONIUM BROMIDE 20 MG: 10 INJECTION INTRAVENOUS at 12:42

## 2020-08-11 RX ADMIN — FLECAINIDE ACETATE 50 MG: 100 TABLET ORAL at 19:35

## 2020-08-11 RX ADMIN — SUGAMMADEX 200 MG: 100 INJECTION, SOLUTION INTRAVENOUS at 14:04

## 2020-08-11 RX ADMIN — FENTANYL CITRATE 50 MCG: 50 INJECTION INTRAMUSCULAR; INTRAVENOUS at 11:38

## 2020-08-11 RX ADMIN — Medication 10 ML: at 19:37

## 2020-08-11 RX ADMIN — METOPROLOL SUCCINATE 12.5 MG: 25 TABLET, EXTENDED RELEASE ORAL at 18:49

## 2020-08-11 RX ADMIN — LIDOCAINE HYDROCHLORIDE 100 MG: 10 INJECTION, SOLUTION EPIDURAL; INFILTRATION; INTRACAUDAL; PERINEURAL at 11:33

## 2020-08-11 RX ADMIN — HEPARIN SODIUM 2000 UNITS: 1000 INJECTION INTRAVENOUS; SUBCUTANEOUS at 12:50

## 2020-08-11 RX ADMIN — MIDAZOLAM 2 MG: 1 INJECTION INTRAMUSCULAR; INTRAVENOUS at 11:26

## 2020-08-11 RX ADMIN — Medication 100 MCG: at 11:47

## 2020-08-11 RX ADMIN — HEPARIN SODIUM 70 ML/HR: 200 INJECTION, SOLUTION INTRAVENOUS at 12:49

## 2020-08-11 RX ADMIN — HEPARIN SODIUM 3000 UNITS: 1000 INJECTION INTRAVENOUS; SUBCUTANEOUS at 12:32

## 2020-08-11 RX ADMIN — FAMOTIDINE 20 MG: 20 TABLET ORAL at 18:49

## 2020-08-11 RX ADMIN — VECURONIUM BROMIDE 10 MG: 1 INJECTION, POWDER, LYOPHILIZED, FOR SOLUTION INTRAVENOUS at 11:34

## 2020-08-11 RX ADMIN — SODIUM CHLORIDE, PRESERVATIVE FREE 10 ML: 5 INJECTION INTRAVENOUS at 19:38

## 2020-08-11 RX ADMIN — PROPOFOL 130 MG: 10 INJECTION, EMULSION INTRAVENOUS at 11:34

## 2020-08-11 RX ADMIN — HEPARIN SODIUM 3000 UNITS: 1000 INJECTION INTRAVENOUS; SUBCUTANEOUS at 12:23

## 2020-08-11 RX ADMIN — FENTANYL CITRATE 25 MCG: 50 INJECTION INTRAMUSCULAR; INTRAVENOUS at 11:35

## 2020-08-11 RX ADMIN — APIXABAN 5 MG: 5 TABLET, FILM COATED ORAL at 18:49

## 2020-08-11 ASSESSMENT — PULMONARY FUNCTION TESTS
PIF_VALUE: 11
PIF_VALUE: 11
PIF_VALUE: 13
PIF_VALUE: 11
PIF_VALUE: 25
PIF_VALUE: 11
PIF_VALUE: 1
PIF_VALUE: 12
PIF_VALUE: 11
PIF_VALUE: 1
PIF_VALUE: 11
PIF_VALUE: 13
PIF_VALUE: 11
PIF_VALUE: 12
PIF_VALUE: 11
PIF_VALUE: 10
PIF_VALUE: 11
PIF_VALUE: 13
PIF_VALUE: 11
PIF_VALUE: 13
PIF_VALUE: 11
PIF_VALUE: 13
PIF_VALUE: 10
PIF_VALUE: 11
PIF_VALUE: 11
PIF_VALUE: 12
PIF_VALUE: 11
PIF_VALUE: 21
PIF_VALUE: 13
PIF_VALUE: 11
PIF_VALUE: 13
PIF_VALUE: 4
PIF_VALUE: 11
PIF_VALUE: 11
PIF_VALUE: 1
PIF_VALUE: 10
PIF_VALUE: 13
PIF_VALUE: 13
PIF_VALUE: 11
PIF_VALUE: 13
PIF_VALUE: 11
PIF_VALUE: 13
PIF_VALUE: 11
PIF_VALUE: 11
PIF_VALUE: 13
PIF_VALUE: 0
PIF_VALUE: 11
PIF_VALUE: 1
PIF_VALUE: 11
PIF_VALUE: 11
PIF_VALUE: 12
PIF_VALUE: 11
PIF_VALUE: 8
PIF_VALUE: 0
PIF_VALUE: 12
PIF_VALUE: 11
PIF_VALUE: 17
PIF_VALUE: 13
PIF_VALUE: 11
PIF_VALUE: 13
PIF_VALUE: 12
PIF_VALUE: 12
PIF_VALUE: 27
PIF_VALUE: 11
PIF_VALUE: 5
PIF_VALUE: 11
PIF_VALUE: 8
PIF_VALUE: 11
PIF_VALUE: 0
PIF_VALUE: 11
PIF_VALUE: 11
PIF_VALUE: 13
PIF_VALUE: 11
PIF_VALUE: 13
PIF_VALUE: 1
PIF_VALUE: 11
PIF_VALUE: 13
PIF_VALUE: 4
PIF_VALUE: 13
PIF_VALUE: 11
PIF_VALUE: 25
PIF_VALUE: 11
PIF_VALUE: 14
PIF_VALUE: 12
PIF_VALUE: 13
PIF_VALUE: 13
PIF_VALUE: 11
PIF_VALUE: 14
PIF_VALUE: 13
PIF_VALUE: 11
PIF_VALUE: 0
PIF_VALUE: 11
PIF_VALUE: 11
PIF_VALUE: 14
PIF_VALUE: 11
PIF_VALUE: 11
PIF_VALUE: 12
PIF_VALUE: 13
PIF_VALUE: 14
PIF_VALUE: 0
PIF_VALUE: 13
PIF_VALUE: 14
PIF_VALUE: 14
PIF_VALUE: 0
PIF_VALUE: 11
PIF_VALUE: 11
PIF_VALUE: 13
PIF_VALUE: 11
PIF_VALUE: 13
PIF_VALUE: 11
PIF_VALUE: 14
PIF_VALUE: 0
PIF_VALUE: 11
PIF_VALUE: 13
PIF_VALUE: 11
PIF_VALUE: 12
PIF_VALUE: 13
PIF_VALUE: 13
PIF_VALUE: 11
PIF_VALUE: 10
PIF_VALUE: 12
PIF_VALUE: 11
PIF_VALUE: 14
PIF_VALUE: 11
PIF_VALUE: 13
PIF_VALUE: 12
PIF_VALUE: 11
PIF_VALUE: 14
PIF_VALUE: 11
PIF_VALUE: 11

## 2020-08-11 ASSESSMENT — PAIN SCALES - GENERAL
PAINLEVEL_OUTOF10: 0

## 2020-08-11 ASSESSMENT — ENCOUNTER SYMPTOMS: SHORTNESS OF BREATH: 0

## 2020-08-11 NOTE — PROCEDURES
was advanced into the esophagus for real-time mapping of the esophagus and careful monitoring of the esophageal temperature during ablation. Using the Penta ray cathter and Carto navigation system a three dimensional electro anatomical mapping of the left atrium, in addition to right and left sided pulmonary vein anatomy was created. During sinus rhythm, we found that all four pulmonary veins (left superior, left inferior, right superior, and right inferior) had PV fascicles, the triggers for the atrial fibrillation. With rapid atrial pacing at 220 ms, we were able to easily induce atrial fibrillation which vacillated with left atrial flutter with 243ms cycle length with [id number] atrial activation. This would always spontaneously terminate to sinus rhythm within 7 minutes. Then wide area circumferential ablations for the left sided pulmonary veins were performed which resulted in electrical isolation of these pulmonary veins and eradication of the PV fascicles. Similarly, wide area circumferential ablations for the right sided pulmonary veins were performed which resulted in electrical isolation of these pulmonary veins and eradication of the PV fascicles. After ablation was complete, catheters were placed in the left and right atrium, His-position, right ventricle, and left ventricle for pacing and recording. Arrhythmia was attempted to be induced by rapid atrial and ventricular pacing, and there was no induction of atrial tachydysrhythmia. Maximum output (20mA) pacing in the L antrum, R antrum, posterior box were also performed and showed dissociation from the rest of the left atrium. This was checked circumferentially around the antrums and verified many times. Adenosine bolus of 18mg was also given for each of the 4 pulmonary veins to assess for acute re-connection and to attempt to induce atrial fibrillation.  We had adequate adenosine effect (AV blocks of > 7 seconds) and there collected: none    Estimated blood loss: < 50 cc    The patient tolerated the procedure well and there were no complications. Patient was extubated and transferred to the floor in stable condition. Conclusion:     - Pre- and post-procedure diagnoses were paroxysmal atrial fibrillation and left atrial flutter with  ms with [id number] activation   - Pulmonary vein isolations using wide area circumferential radiofrequency ablation   - Additional ablation with creation of roof line    Plan:   The patient will be admitted overnight and receive the usual post ablation care. If there are no complications, the patient will be discharged from the hospital tomorrow with pre-admission Flecainide 50mg BID, pre-admission Toprol XL 12.5mg daily, and pre-admission Eliquis 5mg BID. If the patient is hypotensive, however, we will have to discontinue the Flecainide but continue with Toprol XL. The patient will follow-up with the EP NP in 3 month's time. Thank you for allowing us to participate in the care of your patient. If you have any questions or concerns, please do not hesitate to contact me.     Elier Martinez MD, MS, [facility]  Cardiac Electrophysiology  [address]  [address] [facility], [address] [place] [address]  [facility] [address]  [phone]

## 2020-08-11 NOTE — PROGRESS NOTES
Stan Chanel called for orders for straight Cath. Discussed with JAELYN Rivera and iris to order x1 now. Order placed.

## 2020-08-11 NOTE — ANESTHESIA PRE PROCEDURE
Department of Anesthesiology  Preprocedure Note       Name:  Rose Andino   Age:  69 y.o.  :  1950                                          MRN:  [id number]         Date:  2020      Surgeon: * No surgeons listed *    Procedure: a fib ablation    Medications prior to admission:   Prior to Admission medications    Medication Sig Start Date End Date Taking? Authorizing Provider   flecainide (TAMBOCOR) 50 MG tablet TAKE 1 TABLET BY MOUTH TWICE DAILY 20   Kellie Tafoya MD   metoprolol succinate (TOPROL XL) 25 MG extended release tablet Take 0.5 tablets by mouth daily 20   Elier Martinez MD   apixaban (ELIQUIS) 5 MG TABS tablet Take 1 tablet by mouth 2 times daily 20   Elier Martinez MD   Calcium-Magnesium-Vitamin D (CALCIUM 500 PO) Take 500 mg by mouth daily    Historical Provider, MD   ranitidine (ZANTAC) 150 MG tablet Take 150 mg by mouth nightly. Historical Provider, MD   calcitonin, salmon, (MIACALCIN) 200 UNIT/ACT nasal spray 1 spray by Nasal route daily. Historical Provider, MD       Current medications:    Current Outpatient Medications   Medication Sig Dispense Refill    flecainide (TAMBOCOR) 50 MG tablet TAKE 1 TABLET BY MOUTH TWICE DAILY 180 tablet 3    metoprolol succinate (TOPROL XL) 25 MG extended release tablet Take 0.5 tablets by mouth daily 45 tablet 3    apixaban (ELIQUIS) 5 MG TABS tablet Take 1 tablet by mouth 2 times daily 180 tablet 3    Calcium-Magnesium-Vitamin D (CALCIUM 500 PO) Take 500 mg by mouth daily      ranitidine (ZANTAC) 150 MG tablet Take 150 mg by mouth nightly.  calcitoninmassiel (MIACALCIN) 200 UNIT/ACT nasal spray 1 spray by Nasal route daily.        Current Facility-Administered Medications   Medication Dose Route Frequency Provider Last Rate Last Dose    0.9 % sodium chloride infusion   Intravenous Continuous Elier Martinez MD        sodium chloride flush 0.9 % injection 10 mL  10 mL Intravenous 2 times per day Elier Martinez MD  sodium chloride flush 0.9 % injection 10 mL  10 mL Intravenous PRN Mary Sadler MD           Allergies:  No Known Allergies    Problem List:    Patient Active Problem List   Diagnosis Code    Dizziness R42    New onset atrial fibrillation (HCC) I48.91    Near syncope R55    HTN (hypertension) I10    Idiopathic hypotension I95.0    Paroxysmal atrial fibrillation (HCC) I48.0       Past Medical History:        Diagnosis Date    Acid reflux        Past Surgical History:        Procedure Laterality Date    COLONOSCOPY  11/04/2014    ENDOSCOPY, COLON, DIAGNOSTIC  11/04/2014    with biopsy   [address], [place]       Social History:    Social History     Tobacco Use    Smoking status: Never Smoker    Smokeless tobacco: Never Used   Substance Use Topics    Alcohol use: No                                Counseling given: Not Answered      Vital Signs (Current): There were no vitals filed for this visit.                                            BP Readings from Last 3 Encounters:   02/19/20 110/66   04/01/19 92/66   03/19/18 114/68       NPO Status:   >8hrs                                                                                BMI:   Wt Readings from Last 3 Encounters:   02/19/20 118 lb (53.5 kg)   04/01/19 127 lb (57.6 kg)   03/19/18 130 lb (59 kg)     There is no height or weight on file to calculate BMI.    CBC:   Lab Results   Component Value Date    WBC 5.9 08/05/2020    RBC 4.47 08/05/2020    HGB 13.5 08/05/2020    HCT 39.7 08/05/2020    MCV 88.8 08/05/2020    RDW 13.0 08/05/2020     08/05/2020       CMP:   Lab Results   Component Value Date     08/05/2020    K 4.1 08/05/2020     08/05/2020    CO2 23 08/05/2020    BUN 15 08/05/2020    CREATININE 0.8 08/05/2020    GFRAA >60 08/05/2020    GFRAA >60 04/04/2012    AGRATIO 1.5 12/16/2016    LABGLOM >60 08/05/2020    GLUCOSE 102 08/05/2020    PROT 7.0 12/16/2016    PROT 7.6 04/04/2012    CALCIUM 10.2 08/05/2020    BILITOT 0.3 12/16/2016    ALKPHOS 74 12/16/2016    AST 20 12/16/2016    ALT 21 12/16/2016       POC Tests: No results for input(s): POCGLU, POCNA, POCK, POCCL, POCBUN, POCHEMO, POCHCT in the last 72 hours. Coags: No results found for: PROTIME, INR, APTT    HCG (If Applicable): No results found for: PREGTESTUR, PREGSERUM, HCG, HCGQUANT     ABGs: No results found for: PHART, PO2ART, OEB1IMY, ALT0YFK, BEART, R0XZIVVL     Type & Screen (If Applicable):  No results found for: LABABO, LABRH    Drug/Infectious Status (If Applicable):  No results found for: HIV, HEPCAB    COVID-19 Screening (If Applicable):   Lab Results   Component Value Date    COVID19 NOT DETECTED 08/05/2020         Anesthesia Evaluation  Patient summary reviewed no history of anesthetic complications:   Airway: Mallampati: II  TM distance: >3 FB   Neck ROM: full  Mouth opening: > = 3 FB Dental:    (+) upper dentures      Pulmonary: breath sounds clear to auscultation      (-) COPD, asthma, shortness of breath, recent URI and sleep apnea                          ROS comment: States sinus issues, no sense of taste or smell x 20 years    Cardiovascular:    (+) hypertension:, dysrhythmias: atrial fibrillation,     (-) valvular problems/murmurs, past MI, CAD,  angina,  CHF and murmur    ECG reviewed  Rhythm: regular  Rate: normal                    Neuro/Psych:      (-) seizures, neuromuscular disease, TIA, CVA and headaches           GI/Hepatic/Renal:   (+) GERD: well controlled,      (-) PUD, hepatitis, liver disease, no renal disease and bowel prep       Endo/Other:    (+) blood dyscrasia::., .    (-) diabetes mellitus, hypothyroidism, hyperthyroidism               Abdominal:           Vascular:                                      Anesthesia Plan      general     ASA 3       Induction: intravenous.     MIPS: Prophylactic antiemetics administered. Anesthetic plan and risks discussed with patient. Plan discussed with CRNA. This pre-anesthesia assessment may be used as a history and physical.    DOS STAFF ADDENDUM:    Pt seen and examined, chart reviewed (including anesthesia, drug and allergy history). No interval changes to history and physical examination. Anesthetic plan, risks, benefits, alternatives, and personnel involved discussed with patient. Patient verbalized an understanding and agrees to proceed.       Anderson Tovar MD  August 11, 2020  11:05 AM    Anderson Tovar MD   8/11/2020

## 2020-08-11 NOTE — H&P
MOUTH TWICE DAILY 10/15/19   Yes Nasrin Barton MD   apixaban [facility]) 2.5 MG TABS tablet Take 2 tablets by mouth 2 times daily 7/24/19   Yes Nasrin Barton MD   Calcium-Magnesium-Vitamin D (CALCIUM 500 PO) Take 500 mg by mouth daily     Yes Historical Provider, MD   ranitidine (ZANTAC) 150 MG tablet Take 150 mg by mouth nightly.     Yes Historical Provider, MD   calcitonin, salmon, (MIACALCIN) 200 UNIT/ACT nasal spray 1 spray by Nasal route daily.     Yes Historical Provider, MD   CARTIA  MG extended release capsule TAKE 1 CAPSULE BY MOUTH DAILY  Patient not taking: Reported on 2/19/2020 10/15/19     Nasrin Barton MD           Social History:   reports that she has never smoked. She has never used smokeless tobacco. She reports that she does not drink alcohol or use drugs.         Family History:  family history includes Heart Disease in her brother and father; Other in her father and mother. Reviewed. Denies family history of sudden cardiac death, arrhythmia, premature CAD     Review of System:     · General ROS: negative for - chills, fever   · Psychological ROS: negative for - anxiety or depression  · Ophthalmic ROS: negative for - eye pain or loss of vision  · ENT ROS: negative for - epistaxis, headaches, nasal discharge, sore throat   · Allergy and Immunology ROS: negative for - hives, nasal congestion   · Hematological and Lymphatic ROS: negative for - bleeding problems, blood clots, bruising or jaundice  · Endocrine ROS: negative for - skin changes, temperature intolerance or unexpected weight changes  · Respiratory ROS: negative for - cough, hemoptysis, pleuritic pain, SOB, sputum changes or wheezing  · Cardiovascular ROS: Per HPI.    · Gastrointestinal ROS: negative for - abdominal pain, blood in stools, diarrhea, hematemesis, melena, nausea/vomiting or swallowing difficulty/pain  · Genito-Urinary ROS: negative for - dysuria or incontinence  · Musculoskeletal ROS: negative for - joint swelling or muscle pain  · Neurological ROS: negative for - confusion, dizziness, gait disturbance, headaches, numbness/tingling, seizures, speech problems, tremors, visual changes or weakness  · Dermatological ROS: negative for - rash     Physical Examination:  Vitals       Vitals:     20 1107   BP: 110/66   Site: Left Upper Arm   Position: Sitting   Pulse: 85   SpO2: 96%   Weight: 118 lb (53.5 kg)   Height: 5' 4\" (1.626 m)              · Constitutional: Oriented. No distress. · Head: Normocephalic and atraumatic. · Mouth/Throat: Oropharynx is clear and moist.   · Eyes: Conjunctivae normal. EOM are normal.   · Neck: Normal range of motion. Neck supple. No rigidity. No JVD present. · Cardiovascular: Normal rate, regular rhythm, S1&S2 and intact distal pulses. · Pulmonary/Chest: Bilateral respiratory sounds. No wheezes. No rhonchi. · Abdominal: Soft. Bowel sounds present. No distension, No tenderness. · Musculoskeletal: No tenderness. No edema    · Lymphadenopathy: Has no cervical adenopathy. · Neurological: Alert and oriented. Cranial nerve appears intact, No Gross deficit   · Skin: Skin is warm and dry. No rash noted. · Psychiatric: Has a normal mood, affect and behavior      Labs:  Reviewed.      EC20 reviewed, sinus rhythmn 73 bpm, with QRS duration 92 ms. No pathologic Q waves, ventricular pre-excitation, or QT prolongation. QTc 442.     Studies:   1. Event monitor: 1/10/17-2/10/17  Baseline SR   AF with RVR, symptoms correlate with RVR      2. Echo: 16  Summary   Left ventricle size is normal.   Normal left ventricular wall thickness.   Ejection fraction is visually estimated to be 60%.   Normal right ventricular size and function.   No significant valvular disease.     3. Stress Test:  17   Summary    Normal myocardial perfusion study.    Normal LV size and systolic function.     ECG portion of stress test is normal.    Alford score equals 4.    Overall findings represent a low risk scan.          4. Cath: none      The MCOT, echocardiogram, stress test, and coronary angiography/PCI were reviewed by myself and used for my plan of care. Procedures:  1. None      Assessment/Plan:      Atrial fibrillation, paroxsymal   -[id number] Score 3 (HTN, AGE, female, female gender)  -On Eliquis 5 mg BID for  thromboembolic risk reduction. Creatine 0.8 ib -Tolerating well no signs or symptoms of abnormal bruising or bleeding.  -On Cartia  mg daily for rate control of afib, anti-remodeling.  -She reports low BP readings will stop Cartia today and start Toprol XL 12.5 mg daily.  -On Flecainide 50 mg BID for rhythm control. We educated the patient that atrial fibrillation is a progressive disease, with more frequent episodes that will ensue. Subsequent episodes usually become more sustained to the extent that many individuals would then develop persistent atrial fibrillation. Once persistence is reached, permanent atrial fibrillation is inevitable. We discussed different management options for atrial fibrillation including their risks and benefits. These options include use of cardioversion (mainly for persisting atrial fibrillation or atrial flutter) which provides an effective immediate therapy with success rates of 75% or higher, but it provides no short nor long term efficacy.  Anti-arrhythmic medications provide a very effective short term therapy, but even with our most potent anti-arrhythmic medication there is limited long term efficacy (clinical studies have shown that 40% of patients remain atrial fibrillation-free after 4 years of follow-up after starting one of the more powerful anti-arrhythmic medication (amiodarone), and, if extrapolated, may have further diminishing success as time goes on). Atrial fibrillation ablation is a potentially curative therapy with very reasonable success rate after a first time procedure and with improving success rates with subsequent procedures.    The risks, benefits and alternatives of the atrial fibrillation ablation procedure were discussed with the patient. The risks including, but not limited to, bleeding, infection, radiation exposure, injury to vascular, cardiac and surrounding structures (including pneumothorax), stroke, cardiac perforation, tamponade, need for emergent open heart surgery, need for pacemaker implantation, injury to the phrenic nerve, injury to the esophagus, myocardial infarction and death were discussed in detail.      The patient would like some time to deliberate further regarding procedure. If she wishes to proceed, he will contact our nurse, Mj Finn at which time we will schedule for a radiofrequency ablation with Carto Navigation system with a GIANNA procedure immediately prior to this ablation. A cardiac CTA will be ordered for pulmonary vein mapping prior to this procedure. We will hold Eliquis  for 12 hours prior to procedure. We will order BMP, CBC, PT/INR, and Type & Screen prior to the procedure.      Hypertension, essential   She reports low blood pressure readings. SBP occasionally in 70's and 80's  most time running high . Stop Cartia today start Toprol XL 12.5 mg daily. Advised that she can increase her sodium intake over the recommended 2400 mg daily.     Follow up 3 months after atrial fibrillation ablation if she decides to proceed other wise follow up in one year.      Thank you for allowing me to participate in the care of Baron Sahni. All questions and concerns were addressed to the patient/family. Alternatives to my treatment were discussed.      I have reviewed the history and physical and examined the patient and find no relevant changes.  I have reviewed with the patient and/or family the risks, benefits, and alternatives to the procedure.  Mike Douglas MD, MS, [facility] [place], [facility]  [address], [address] [place] [address], [place] [address]  [phone]

## 2020-08-11 NOTE — ANESTHESIA POSTPROCEDURE EVALUATION
Department of Anesthesiology  Postprocedure Note    Patient: Lucretia Love  MRN: [id number]  YOB: 1950  Date of evaluation: 8/11/2020  Time:  5:59 PM     Procedure Summary     Date:  08/11/20 Room / Location:  [facility] Cath Lab    Anesthesia Start:  1125 Anesthesia Stop:  1425    Procedure:  WST GIANNA AFIB ABLATION W ANES Diagnosis:       Paroxysmal atrial fibrillation (HCC)      Paroxysmal atrial fibrillation ([address]    Scheduled Providers:   Responsible Provider:  Anderson Tovar MD    Anesthesia Type:  general ASA Status:  3          Anesthesia Type: general    Evangelina Phase I:      Evangelina Phase II:      Last vitals: Reviewed and per EMR flowsheets.        Anesthesia Post Evaluation    Patient location during evaluation: PACU  Level of consciousness: awake and alert  Airway patency: patent  Nausea & Vomiting: no nausea and no vomiting  Complications: no  Cardiovascular status: blood pressure returned to baseline  Respiratory status: acceptable  Hydration status: euvolemic  Comments: Postoperative Anesthesia Note    Name:    Lucretia Love  MRN:      [id number]    Patient Vitals in the past 12 hrs:  08/11/20 1115, BP:(!) 135/90, Temp:98.5 °F (36.9 °C), Temp src:Oral, Pulse:86, Resp:17, SpO2:100 %, Height:5' 4\" (1.626 m), Weight:114 lb (51.7 kg)     LABS:    CBC  Lab Results       Component                Value               Date/Time                  WBC                      5.9                 08/05/2020 01:05 PM        HGB                      13.5                08/05/2020 01:05 PM        HCT                      39.7                08/05/2020 01:05 PM        PLT                      184                 08/05/2020 01:05 PM   RENAL  Lab Results       Component                Value               Date/Time                  NA                       142                 08/05/2020 01:05 PM        K                        4.1                 08/05/2020 01:05 PM        CL                       104 08/05/2020 01:05 PM        CO2                      23                  08/05/2020 01:05 PM        BUN                      15                  08/05/2020 01:05 PM        CREATININE               0.8                 08/05/2020 01:05 PM        GLUCOSE                  102 (H)             08/05/2020 01:05 PM   COAGS  No results found for: PROTIME, INR, APTT    Intake & Output:  @29VRGK@    Nausea & Vomiting:  No    Level of Consciousness:  Awake    Pain Assessment:  Adequate analgesia    Anesthesia Complications:  No apparent anesthetic complications    SUMMARY      Vital signs stable  OK to discharge from Stage I post anesthesia care.   Care transferred from Anesthesiology department on discharge from perioperative area

## 2020-08-12 VITALS
TEMPERATURE: 98.8 F | DIASTOLIC BLOOD PRESSURE: 58 MMHG | WEIGHT: 117.73 LBS | SYSTOLIC BLOOD PRESSURE: 120 MMHG | OXYGEN SATURATION: 99 % | HEIGHT: 64 IN | BODY MASS INDEX: 20.1 KG/M2 | RESPIRATION RATE: 16 BRPM | HEART RATE: 82 BPM

## 2020-08-12 PROBLEM — Z86.79 S/P ABLATION OF ATRIAL FIBRILLATION: Status: ACTIVE | Noted: 2020-08-12

## 2020-08-12 PROBLEM — Z98.890 S/P ABLATION OF ATRIAL FIBRILLATION: Status: ACTIVE | Noted: 2020-08-12

## 2020-08-12 PROBLEM — I10 HTN (HYPERTENSION): Status: RESOLVED | Noted: 2017-03-08 | Resolved: 2020-08-12

## 2020-08-12 LAB
EKG ATRIAL RATE: 101 BPM
EKG DIAGNOSIS: NORMAL
EKG P AXIS: 73 DEGREES
EKG P-R INTERVAL: 172 MS
EKG Q-T INTERVAL: 308 MS
EKG QRS DURATION: 74 MS
EKG QTC CALCULATION (BAZETT): 399 MS
EKG R AXIS: 29 DEGREES
EKG T AXIS: -72 DEGREES
EKG VENTRICULAR RATE: 101 BPM

## 2020-08-12 PROCEDURE — 94760 N-INVAS EAR/PLS OXIMETRY 1: CPT

## 2020-08-12 PROCEDURE — 2580000003 HC RX 258: Performed by: INTERNAL MEDICINE

## 2020-08-12 PROCEDURE — 93010 ELECTROCARDIOGRAM REPORT: CPT | Performed by: INTERNAL MEDICINE

## 2020-08-12 PROCEDURE — 99239 HOSP IP/OBS DSCHRG MGMT >30: CPT | Performed by: NURSE PRACTITIONER

## 2020-08-12 PROCEDURE — 6370000000 HC RX 637 (ALT 250 FOR IP): Performed by: INTERNAL MEDICINE

## 2020-08-12 RX ADMIN — SODIUM CHLORIDE, PRESERVATIVE FREE 10 ML: 5 INJECTION INTRAVENOUS at 07:58

## 2020-08-12 RX ADMIN — FLECAINIDE ACETATE 50 MG: 100 TABLET ORAL at 07:58

## 2020-08-12 RX ADMIN — METOPROLOL SUCCINATE 12.5 MG: 25 TABLET, EXTENDED RELEASE ORAL at 07:57

## 2020-08-12 RX ADMIN — APIXABAN 5 MG: 5 TABLET, FILM COATED ORAL at 07:58

## 2020-08-12 RX ADMIN — FAMOTIDINE 20 MG: 20 TABLET ORAL at 07:57

## 2020-08-12 ASSESSMENT — PAIN DESCRIPTION - ONSET
ONSET_2: ON-GOING
ONSET: ON-GOING

## 2020-08-12 ASSESSMENT — PAIN SCALES - GENERAL
PAINLEVEL_OUTOF10: 0
PAINLEVEL_OUTOF10: 2

## 2020-08-12 ASSESSMENT — PAIN DESCRIPTION - PAIN TYPE
TYPE: SURGICAL PAIN
TYPE_2: ACUTE PAIN

## 2020-08-12 ASSESSMENT — PAIN DESCRIPTION - DESCRIPTORS
DESCRIPTORS: SORE
DESCRIPTORS_2: ACHING;SORE

## 2020-08-12 ASSESSMENT — PAIN DESCRIPTION - ORIENTATION
ORIENTATION: RIGHT
ORIENTATION_2: LOWER

## 2020-08-12 ASSESSMENT — PAIN DESCRIPTION - INTENSITY: RATING_2: 2

## 2020-08-12 ASSESSMENT — PAIN DESCRIPTION - FREQUENCY: FREQUENCY: CONTINUOUS

## 2020-08-12 ASSESSMENT — PAIN - FUNCTIONAL ASSESSMENT: PAIN_FUNCTIONAL_ASSESSMENT: ACTIVITIES ARE NOT PREVENTED

## 2020-08-12 ASSESSMENT — PAIN DESCRIPTION - PROGRESSION
CLINICAL_PROGRESSION: NOT CHANGED
CLINICAL_PROGRESSION_2: NOT CHANGED

## 2020-08-12 ASSESSMENT — PAIN DESCRIPTION - DURATION: DURATION_2: CONTINUOUS

## 2020-08-12 ASSESSMENT — PAIN DESCRIPTION - LOCATION
LOCATION: GROIN
LOCATION_2: BACK

## 2020-08-12 NOTE — PROGRESS NOTES
Cardiac Electrophysiology Progress Note     Admit Date: 8/11/2020     Reason for follow up: paroxysmal atrial fibrillation s/p ablation    HPI and Interval History:     The patient is a 69year old female with past medical history significant for PAF, syncope and hypotension. She presented to the hospital yesterday for ablation. She underwent EP study, pulmonary vein isolation ablation, additional ablation with creation of a roof line and external cardioversion on 8/11/20 with Dr. Mel Hart. Her preadmission medications of flecainide 50mg BID, Toprol XL 12.5mg QD and Eliquis 5mg BID were continued. Post-procedure she did have some urinary retention and was straight cathed once. She also had nausea with vomiting, during which she had a 9.3 second pause but was asymptomatic this this. Nausea has resolved, she is waiting on breakfast. She has been able to urinate. Has some very slight chest discomfort while taking a deep breath or coughing. No SOB. Review of System:  · General: negative for fever, chills   · Ophthalmic ROS: negative for eye pain or loss of vision  · ENT ROS: negative for headaches, sore throat, nasal drainage  · Respiratory: negative for cough, sputum, SOB  · Cardiovascular: positive for mild pleuritic chest pain, no palpitations.   · Gastrointestinal: positive for nausea/vomiting overnight, negative for abdominal pain, diarrhea  · Hematology: negative for bleeding, blood clots, bruising or jaundice  · Genito-Urinary:  negative for dysuria or incontinence  · Musculoskeletal: negative for joint swelling, muscle pain  · Neurological: negative for confusion, dizziness, headaches   · Psychiatric: negative anxiety, depression  · Dermatological: negative for rash      Physical Examination:  Vitals:    08/12/20 0747   BP: (!) 120/58   Pulse: 82   Resp: 16   Temp: 98.8 °F (37.1 °C)   SpO2: 97%        Intake/Output Summary (Last 24 hours) at 8/12/2020 0818  Last data filed at 8/12/2020 0515  Gross per 24 flush, acetaminophen, ondansetron     EC20  ST. Non-specific ST-T wave changes. Echo:16   Conclusions      Summary   Left ventricle size is normal.   Normal left ventricular wall thickness. Ejection fraction is visually estimated to be 60%. Normal right ventricular size and function. No significant valvular disease. Stress: 17  Conclusions          Summary     Normal myocardial perfusion study.     Normal LV size and systolic function.  ECG portion of stress test is normal.     Alford score equals 4.     Overall findings represent a low risk scan. Cardiac event monitor: 17  Baseline SR, A fib with RVR which was symptomatic. Assessment and Plan:     Paroxysmal atrial fibrillation   - s/p PVI ablation, roof line ablation and external cardioversion yesterday   - doing well this morning, nausea and urinary retention are now resolved   - continue pre-admission flecainide 50mg BID, Toprol XL 12.5mg QD and Eliquis 5mg BID (CHADS2-VASc 2 age and gender)    - advised her to monitor BP and let us know if it is running low as medications may need adjusted    Sinus arrest   - likely vasovagal response secondary to vomiting, pt was asymptomatic   - does have a history of syncope - she tries to stay well hydrated and has increased sodium intake slightly with much improvement      Discussed with Dr. Natasha Hernandez.     BRUNILDA Martinez  [address], [address] [place] [address]  Phone: [phone]  Fax: [phone]    Electronically signed by BRUNILDA Tellez - CNP on 2020 at 8:18 AM

## 2020-08-12 NOTE — PROGRESS NOTES
@2220 admitted to the room from cath lab via the bed. Does have right femoral figure 8 in place covered by tegaderm and does have pressure dressing to the site. No bleeding or hematoma noted. Site is tender to touch. Is supine in the bed did place in reverse trend. States is slightly dizzy with head movement. @2811 did receive call from Dr Nalini Woo. Did update on current vitals. States if systolic BP < 440 to hold the flecainide but give the Toprol. @1445 is shivering. Did apply warm blankets. Did have her  some back and did update. @1500 states nausea. Did call Dr Nalini Woo at office and did rec. Order for zofran iv.   @5971 did place more warm blankets and states still cold. @7981 did medicate for nausea. @1535 temp remains 96.4. did place blanket warmer to the patient. @1550 states warm now. Temp 97.4. did place blanket warmer on low setting. @1326 unable to urinate. Did call Dr Nalini Woo office and did rec order to straight cath. @6174 did straight cath and got 750 cc if clear yellow urine, states feels a lot better. Right groin site has no bleeding noted. Did remove the pressure dressing. Site remains tender. @8809 was able to eat jello and hot tea. @ [id number] did vomit approx 200cc green/yellow did vagel down on her heart rate to 20's but returned back to the 80's sinus. @8956 Dr Nalini Woo paged to update. @4218 did rec call from Dr Nalini Woo. Updated. Did rec order for Scopolamine patch. @3124 was able to take her po medications.    @0362 did give handoff report to Venecia Guardado

## 2020-08-12 NOTE — PROGRESS NOTES
0745--Handoff report with Delano Ortiz RN. Pt A&Ox4 in bed. R groin site slight bruised and tender but soft and no bleeding noted. 0945--Very small lump noted on R groin. Pressed out. No bleeding. Pt then walked in [place] with this RN. Pt returned to bed and R groin site WNL. 1000--Discharge education completed. All questions answered. R groin site WNL. 1008--I wheeled pt to discharge bridge where she was picked up by her .      Electronically signed by Shelli Blood RN on 8/12/2020 at 10:17 AM

## 2020-08-12 NOTE — PROGRESS NOTES
Post procedure site check completed. Figure 8 stitching has been removed. Procedure site is within normal limits and appears to be free of complications. All concerns were reviewed and questions answered. Patient verbalized understanding.      Electronically signed by Princess Marshall RN on 8/12/2020 at 7:15 AM

## 2020-08-12 NOTE — DISCHARGE SUMMARY
DISCHARGE SUMMARY  Patient ID:  Belia Dennison  [id number]  89 y.o.  1950    Admit date: 8/11/2020    Discharge date: 8/12/2020    Admitting Physician: Madison King MD     Discharge Provider: BRUNILDA Hilton     Admission Diagnoses: Paroxysmal atrial fibrillation [facility]) [I48.0]  Paroxysmal atrial fibrillation [facility]) [I48.0]    Discharge Diagnoses: Same    Discharged Condition: Good    Hospital Course: Belia Dennison was admitted on 8/11/2020 and underwent EP study, pulmonary vein isolation ablation, additional ablation with creation of a roof line and external cardioversion on 8/11/20 with Dr. Rasta Heard. Her preadmission medications of flecainide 50mg BID, Toprol XL 12.5mg QD and Eliquis 5mg BID were continued. She has remained in sinus rhythm overnight. She did have some urinary retention that required straight catheterization once and is now resolved. She also had nausea post-procedure and vomiting with a 9 second pause while vomiting but was asymptomatic with this. She does have a history of syncope and we discussed staying well hydrated. She was advised to call the office if she has consistently low blood pressures are home and/or syncope/near syncope. Her groin site this morning has small amount of blood on her dressing and some mild bruising. No hematoma or erythema. Post-ablation medications and activity limitations were discussed. She is overall in good condition this morning to be discharged home and will follow up with me in the office in 3 months.     Procedures:  8/11/20  · Electrophysiology study with radiofrequency ablation of atrial fibrillation and pulmonary vein isolation   · Additional ablation with creation of a roof line  · 3-D electroanatomical mapping of the left atrium    · Transseptal puncture through an intact septum x 2 under intracardiac ultrasound guidance without fluoroscopic guidance   · Intracardiac echocardiography  · External cardioversion of the atrial arrhythmias

## 2020-08-12 NOTE — PROGRESS NOTES
Handoff with Kiki Sutton RN. Patient resting in bed, oriented x 4, femoral site has slight bruising, scant oozing, no hematoma, or redness. Pt has no needs at this time.  Electronically signed by Marco Antonio Morel RN on 8/12/20 at 7:48 AM EDT

## 2020-08-17 ENCOUNTER — TELEPHONE (OUTPATIENT)
Dept: CARDIOLOGY CLINIC | Age: 70
End: 2020-08-17

## 2020-08-17 NOTE — TELEPHONE ENCOUNTER
Umberto Estes called in this afternoon stating that she had an Ablation on 8/11/20. Yesterday she noticed a lump on her groin area. She stated that it hurts to touch, and buried. She is wanting to know if she should be seen?      You can reach Umberto Estes at #[phone]

## 2020-08-19 ENCOUNTER — NURSE ONLY (OUTPATIENT)
Dept: CARDIOLOGY CLINIC | Age: 70
End: 2020-08-19

## 2020-08-19 NOTE — PROGRESS NOTES
Patient to office for a groin site check. Site clean with mild bruising. Very small, 2 cm hematoma noted at the insertion site but no redness, s/s of infection or bleeding. She states the site is  but denies any significant pain. Instructed she may resume normal activities as tolerated and call with any worsening symptoms. She v/u. Will follow up in November.

## 2020-11-11 NOTE — PROGRESS NOTES
[address]   Electrophysiology      Date: 11/12/2020    Chief Complaint:   Chief Complaint   Patient presents with    Follow-up     afib / s/p ablation - no cardiac complaints     History of Present Illness:    I saw Becka Bianchi in the office for electrophysiology follow up today. She is a 69 y.o. female with a past medical history of paroxysmal atrial fibrillation, syncope and hypotension. She underwent EP study, ablation of atrial fibrillation with pulmonary vein isolation, additional ablation with creation of a roof line and external cardioversion on 8/11/20 with Dr. Keeley Rodrigez. Her preadmission medications of flecainide 50mg BID, Toprol XL 12.5mg QD and Eliquis 5mg BID were continued. She presents today for follow up from her procedure. She has been feeling well without any atrial fibrillation that she has been aware of. She does have somewhat chronic hypotension and does bring in blood pressure readings which mostly range from 71Y-330G systolic, occasionally in the 80s. She denies any syncope or near syncope. No chest pain, palpitations, dyspnea or edema. No bleeding issues. Allergies:  No Known Allergies  Home Medications:  Prior to Visit Medications    Medication Sig Taking? Authorizing Provider   famotidine (PEPCID) 20 MG tablet Take 20 mg by mouth 2 times daily Yes Historical Provider, MD   apixaban (ELIQUIS) 5 MG TABS tablet Take 1 tablet by mouth 2 times daily Yes Temi Damon MD   Calcium-Magnesium-Vitamin D (CALCIUM 500 PO) Take 500 mg by mouth daily Yes Historical Provider, MD   calcitonin, salmon, (MIACALCIN) 200 UNIT/ACT nasal spray 1 spray by Nasal route daily.  Yes Historical Provider, MD        Past Medical History:  Past Medical History:   Diagnosis Date    Acid reflux        Past Surgical History:   Past Surgical History:   Procedure Laterality Date    COLONOSCOPY  11/04/2014    ENDOSCOPY, COLON, DIAGNOSTIC  11/04/2014    with biopsy   [facility] Dr. Samul Crews [address]ani [address] Dr, Dr.       Social History:   reports that she has never smoked. She has never used smokeless tobacco. She reports that she does not drink alcohol or use drugs. Family History:      Problem Relation Age of Onset    Other Mother     Other Father     Heart Disease Father     Heart Disease Brother        Review of Systems   Constitutional: Negative for chills, fatigue, fever and unexpected weight change. HENT: Negative for congestion, hearing loss, sinus pressure, sore throat and trouble swallowing. Respiratory: Negative for cough, shortness of breath and wheezing. Cardiovascular: Negative for chest pain, palpitations and leg swelling. Gastrointestinal: Negative for abdominal pain, blood in stool, constipation, diarrhea, nausea and vomiting. Genitourinary: Negative for hematuria. Musculoskeletal: Negative for arthralgias, back pain, gait problem and myalgias. Skin: Negative for color change, rash and wound. Neurological: Negative for dizziness, seizures, syncope, speech difficulty, weakness and light-headedness. Hematological: Does not bruise/bleed easily. Physical Examination:  Vitals:    11/12/20 1331   BP: 100/68   Pulse: 69   Temp: 97.1 °F (36.2 °C)   SpO2: 99%      Wt Readings from Last 3 Encounters:   11/12/20 118 lb (53.5 kg)   08/12/20 117 lb 11.6 oz (53.4 kg)   02/19/20 118 lb (53.5 kg)       Physical Exam  Vitals signs reviewed. Constitutional:       General: She is not in acute distress. Appearance: Normal appearance. HENT:      Head: Normocephalic and atraumatic. Nose: Nose normal.      Mouth/Throat:      Mouth: Mucous membranes are moist.   Eyes:      Conjunctiva/sclera: Conjunctivae normal.      Pupils: Pupils are equal, round, and reactive to light. Cardiovascular:      Rate and Rhythm: Normal rate and regular rhythm.       Heart sounds: No murmur. No friction rub. No gallop. Pulmonary:      Effort: No respiratory distress. Breath sounds: No wheezing, rhonchi or rales. Abdominal:      General: Abdomen is flat. Bowel sounds are normal.      Palpations: Abdomen is soft. Musculoskeletal: Normal range of motion. Right lower leg: No edema. Left lower leg: No edema. Skin:     General: Skin is warm and dry. Findings: No bruising. Neurological:      General: No focal deficit present. Mental Status: She is alert and oriented to person, place, and time. Motor: No weakness. Psychiatric:         Mood and Affect: Mood normal.         Behavior: Behavior normal.          Pertinent labs, diagnostic, device, and imaging results reviewed as a part of this visit    LABS    CBC:   Lab Results   Component Value Date    WBC 5.9 2020    HGB 13.5 2020    HCT 39.7 2020    MCV 88.8 2020     2020     BMP:   Lab Results   Component Value Date    CREATININE 0.8 2020    BUN 15 2020     2020    K 4.1 2020     2020    CO2 23 2020     Estimated Creatinine Clearance: 54 mL/min (based on SCr of 0.8 mg/dL). No results found for: BNP    Thyroid:   Lab Results   Component Value Date    TSH 2.34 2016     Lipid Panel: No results found for: CHOL, HDL, TRIG  LFTs:  Lab Results   Component Value Date    ALT 21 2016    AST 20 2016    ALKPHOS 74 2016    BILITOT 0.3 2016     Coags: No results found for: PROTIME, INR, APTT    EC2020   SR at 67 BPM. Non-specific ST-T wave changes. Echo:16   Conclusions      Summary   Left ventricle size is normal.   Normal left ventricular wall thickness.   Ejection fraction is visually estimated to be 60%.   Normal right ventricular size and function.   No significant valvular disease.     Stress: 17  Conclusions          Summary     Normal myocardial perfusion study.  Normal LV size and systolic function.  ECG portion of stress test is normal.     Alford score equals 4.     Overall findings represent a low risk scan.       Cardiac event monitor: 2/24/17  Baseline SR, A fib with RVR which was symptomatic.      Procedures:  1. Electrophysiology study with radiofrequency ablation of atrial fibrillation and pulmonary vein isolation, additional ablation with creation of a roof line - 8/11/20 Dr. Tano Nelson and Plan:      Paroxysmal atrial fibrillation            - s/p PVI ablation, roof line ablation on 8/11/20 with Dr. Young Free            - stop flecainide and Toprol, get 30 day KEVIN as she is now outside the 90 day blanking period    - CHADS2-VASc 2 (age and gender), on Eliquis 5mg BID                  Hypotension   - history of syncope in the past, has been asymptomatic with her low pressures   - stopping metoprolol and flecainide which should improve BP   - should stay well hydrated and change positions slowly        Thank you for allowing to us to participate in the care of José Miguel De. Return in about 2 months (around 1/12/2021) for an appointment with Jolie Escobedo NP.      BRUNILDA Flores  [facility] [address], [address] [place] [address]  Phone: [phone]  Fax: [phone]    Electronically signed by BRUNILDA Landaverde CNP on 11/12/2020 at 2:01 PM

## 2020-11-12 ENCOUNTER — OFFICE VISIT (OUTPATIENT)
Dept: CARDIOLOGY CLINIC | Age: 70
End: 2020-11-12
Payer: MEDICARE

## 2020-11-12 VITALS
HEIGHT: 64 IN | SYSTOLIC BLOOD PRESSURE: 100 MMHG | BODY MASS INDEX: 20.14 KG/M2 | OXYGEN SATURATION: 99 % | HEART RATE: 69 BPM | WEIGHT: 118 LBS | TEMPERATURE: 97.1 F | DIASTOLIC BLOOD PRESSURE: 68 MMHG

## 2020-11-12 PROCEDURE — 3017F COLORECTAL CA SCREEN DOC REV: CPT | Performed by: NURSE PRACTITIONER

## 2020-11-12 PROCEDURE — G8399 PT W/DXA RESULTS DOCUMENT: HCPCS | Performed by: NURSE PRACTITIONER

## 2020-11-12 PROCEDURE — 99214 OFFICE O/P EST MOD 30 MIN: CPT | Performed by: NURSE PRACTITIONER

## 2020-11-12 PROCEDURE — 1090F PRES/ABSN URINE INCON ASSESS: CPT | Performed by: NURSE PRACTITIONER

## 2020-11-12 PROCEDURE — G8484 FLU IMMUNIZE NO ADMIN: HCPCS | Performed by: NURSE PRACTITIONER

## 2020-11-12 PROCEDURE — 1036F TOBACCO NON-USER: CPT | Performed by: NURSE PRACTITIONER

## 2020-11-12 PROCEDURE — 1123F ACP DISCUSS/DSCN MKR DOCD: CPT | Performed by: NURSE PRACTITIONER

## 2020-11-12 PROCEDURE — G8420 CALC BMI NORM PARAMETERS: HCPCS | Performed by: NURSE PRACTITIONER

## 2020-11-12 PROCEDURE — G8427 DOCREV CUR MEDS BY ELIG CLIN: HCPCS | Performed by: NURSE PRACTITIONER

## 2020-11-12 PROCEDURE — 93000 ELECTROCARDIOGRAM COMPLETE: CPT | Performed by: NURSE PRACTITIONER

## 2020-11-12 PROCEDURE — 4040F PNEUMOC VAC/ADMIN/RCVD: CPT | Performed by: NURSE PRACTITIONER

## 2020-11-12 RX ORDER — FAMOTIDINE 20 MG/1
20 TABLET, FILM COATED ORAL DAILY
COMMUNITY

## 2020-11-12 ASSESSMENT — ENCOUNTER SYMPTOMS
DIARRHEA: 0
SORE THROAT: 0
ABDOMINAL PAIN: 0
CONSTIPATION: 0
VOMITING: 0
NAUSEA: 0
WHEEZING: 0
BACK PAIN: 0
TROUBLE SWALLOWING: 0
COUGH: 0
COLOR CHANGE: 0
BLOOD IN STOOL: 0
SINUS PRESSURE: 0
SHORTNESS OF BREATH: 0

## 2020-12-14 PROCEDURE — 93228 REMOTE 30 DAY ECG REV/REPORT: CPT | Performed by: INTERNAL MEDICINE

## 2021-01-12 ENCOUNTER — OFFICE VISIT (OUTPATIENT)
Dept: CARDIOLOGY CLINIC | Age: 71
End: 2021-01-12
Payer: MEDICARE

## 2021-01-12 VITALS
BODY MASS INDEX: 20.32 KG/M2 | DIASTOLIC BLOOD PRESSURE: 68 MMHG | SYSTOLIC BLOOD PRESSURE: 106 MMHG | OXYGEN SATURATION: 97 % | HEART RATE: 80 BPM | TEMPERATURE: 97.5 F | HEIGHT: 64 IN | WEIGHT: 119 LBS

## 2021-01-12 DIAGNOSIS — Z86.79 S/P ABLATION OF ATRIAL FIBRILLATION: ICD-10-CM

## 2021-01-12 DIAGNOSIS — I48.0 PAROXYSMAL ATRIAL FIBRILLATION (HCC): Primary | ICD-10-CM

## 2021-01-12 DIAGNOSIS — Z98.890 S/P ABLATION OF ATRIAL FIBRILLATION: ICD-10-CM

## 2021-01-12 PROCEDURE — G8420 CALC BMI NORM PARAMETERS: HCPCS | Performed by: NURSE PRACTITIONER

## 2021-01-12 PROCEDURE — 3017F COLORECTAL CA SCREEN DOC REV: CPT | Performed by: NURSE PRACTITIONER

## 2021-01-12 PROCEDURE — G8427 DOCREV CUR MEDS BY ELIG CLIN: HCPCS | Performed by: NURSE PRACTITIONER

## 2021-01-12 PROCEDURE — 1090F PRES/ABSN URINE INCON ASSESS: CPT | Performed by: NURSE PRACTITIONER

## 2021-01-12 PROCEDURE — G8399 PT W/DXA RESULTS DOCUMENT: HCPCS | Performed by: NURSE PRACTITIONER

## 2021-01-12 PROCEDURE — 1123F ACP DISCUSS/DSCN MKR DOCD: CPT | Performed by: NURSE PRACTITIONER

## 2021-01-12 PROCEDURE — 4040F PNEUMOC VAC/ADMIN/RCVD: CPT | Performed by: NURSE PRACTITIONER

## 2021-01-12 PROCEDURE — 93000 ELECTROCARDIOGRAM COMPLETE: CPT | Performed by: NURSE PRACTITIONER

## 2021-01-12 PROCEDURE — 1036F TOBACCO NON-USER: CPT | Performed by: NURSE PRACTITIONER

## 2021-01-12 PROCEDURE — 99213 OFFICE O/P EST LOW 20 MIN: CPT | Performed by: NURSE PRACTITIONER

## 2021-01-12 PROCEDURE — G8484 FLU IMMUNIZE NO ADMIN: HCPCS | Performed by: NURSE PRACTITIONER

## 2021-01-12 ASSESSMENT — ENCOUNTER SYMPTOMS
COLOR CHANGE: 0
DIARRHEA: 0
SHORTNESS OF BREATH: 0
VOMITING: 0
SORE THROAT: 0
NAUSEA: 0
CONSTIPATION: 0
SINUS PRESSURE: 0
TROUBLE SWALLOWING: 0
BACK PAIN: 0
ABDOMINAL PAIN: 0
WHEEZING: 0
BLOOD IN STOOL: 0
COUGH: 0

## 2021-01-12 NOTE — PROGRESS NOTES
[address]   Electrophysiology      Date: 1/12/2021    Chief Complaint:   Chief Complaint   Patient presents with    Follow-up     afib - no cardiac complaints     History of Present Illness:    I saw Mady Walsh in the office for electrophysiology follow up today. She is a 69 y.o. female with a past medical history of paroxysmal atrial fibrillation, syncope and hypotension. She underwent EP study, ablation of atrial fibrillation with pulmonary vein isolation, additional ablation with creation of a roof line on 8/11/20 with Dr. Jose Slaughter. Her flecainide and Toprol were stopped when she was out past her 90 day blanking period and she wore a 30 day event monitor. Monitor showed sinus with some PVCs. She has been feeling well for the most part. She did have an episode 3 days ago where she felt tired and felt like something was stuck in her throat although she had not eaten recently. He checked her blood pressure and it was around her normal which is low (SBPs typically in the 90s-100s) and her heart rate was in the low 90s but her blood pressure cuff gave her an \"irregular heart beat\" sign. This gradually resolved and she denies any issues since. She remains somewhat active with no changes in her activity. No syncope. Allergies:  No Known Allergies  Home Medications:  Prior to Visit Medications    Medication Sig Taking? Authorizing Provider   famotidine (PEPCID) 20 MG tablet Take 20 mg by mouth 2 times daily Yes Historical Provider, MD   apixaban (ELIQUIS) 5 MG TABS tablet Take 1 tablet by mouth 2 times daily Yes Rolando Patel MD   Calcium-Magnesium-Vitamin D (CALCIUM 500 PO) Take 500 mg by mouth daily Yes Historical Provider, MD   calcitonin, salmon, (MIACALCIN) 200 UNIT/ACT nasal spray 1 spray by Nasal route daily.  Yes Historical Provider, MD        Past Medical History:  Past Medical History:   Diagnosis Date    Acid reflux        Past Surgical History:   Past Surgical History:   Procedure Laterality Date    COLONOSCOPY  11/04/2014    ENDOSCOPY, COLON, DIAGNOSTIC  11/04/2014    with biopsy   Renato Zimmer Dr, Jessi Wright       Social History:   reports that she has never smoked. She has never used smokeless tobacco. She reports that she does not drink alcohol or use drugs. Family History:      Problem Relation Age of Onset    Other Mother     Other Father     Heart Disease Father     Heart Disease Brother        Review of Systems   Constitutional: Negative for chills, fatigue, fever and unexpected weight change. HENT: Negative for congestion, hearing loss, sinus pressure, sore throat and trouble swallowing. Respiratory: Negative for cough, shortness of breath and wheezing. Cardiovascular: Negative for chest pain, palpitations and leg swelling. Low BP. Gastrointestinal: Negative for abdominal pain, blood in stool, constipation, diarrhea, nausea and vomiting. Genitourinary: Negative for hematuria. Musculoskeletal: Negative for arthralgias, back pain, gait problem and myalgias. Skin: Negative for color change, rash and wound. Neurological: Negative for dizziness, seizures, syncope, speech difficulty, weakness and light-headedness. Hematological: Does not bruise/bleed easily. Physical Examination:  Vitals:    01/12/21 1306   BP: 106/68   Pulse: 80   Temp: 97.5 °F (36.4 °C)   SpO2: 97%      Wt Readings from Last 3 Encounters:   01/12/21 119 lb (54 kg)   11/12/20 118 lb (53.5 kg)   08/12/20 117 lb 11.6 oz (53.4 kg)       Physical Exam  Vitals signs reviewed. Constitutional:       General: She is not in acute distress. Appearance: Normal appearance. HENT:      Head: Normocephalic and atraumatic.       Nose: Nose normal.      Mouth/Throat:      Mouth: Mucous membranes are moist.   Eyes:      Conjunctiva/sclera: Conjunctivae normal. Pupils: Pupils are equal, round, and reactive to light. Cardiovascular:      Rate and Rhythm: Normal rate and regular rhythm. Heart sounds: No murmur. No friction rub. No gallop. Pulmonary:      Effort: No respiratory distress. Breath sounds: No wheezing, rhonchi or rales. Abdominal:      General: Abdomen is flat. Bowel sounds are normal.      Palpations: Abdomen is soft. Musculoskeletal: Normal range of motion. Right lower leg: No edema. Left lower leg: No edema. Skin:     General: Skin is warm and dry. Findings: No bruising. Neurological:      General: No focal deficit present. Mental Status: She is alert and oriented to person, place, and time. Motor: No weakness. Psychiatric:         Mood and Affect: Mood normal.         Behavior: Behavior normal.          Pertinent labs, diagnostic, device, and imaging results reviewed as a part of this visit    LABS    CBC:   Lab Results   Component Value Date    WBC 5.9 2020    HGB 13.5 2020    HCT 39.7 2020    MCV 88.8 2020     2020     BMP:   Lab Results   Component Value Date    CREATININE 0.8 2020    BUN 15 2020     2020    K 4.1 2020     2020    CO2 23 2020     CrCl cannot be calculated (Patient's most recent lab result is older than the maximum 120 days allowed. ). No results found for: BNP    Thyroid:   Lab Results   Component Value Date    TSH 2.34 2016     Lipid Panel: No results found for: CHOL, HDL, TRIG  LFTs:  Lab Results   Component Value Date    ALT 21 2016    AST 20 2016    ALKPHOS 74 2016    BILITOT 0.3 2016     Coags: No results found for: PROTIME, INR, APTT    EC2021   SR at 77 BPM. Non-specific ST-T wave changes.      Echo:16   Conclusions      Summary   Left ventricle size is normal.   Normal left ventricular wall thickness.   Ejection fraction is visually estimated to be 60%.   Normal right ventricular size and function.   No significant valvular disease.     Stress: 2/7/17  Conclusions          Summary     Normal myocardial perfusion study.     Normal LV size and systolic function.  ECG portion of stress test is normal.     Alford score equals 4.     Overall findings represent a low risk scan.       Cardiac event monitor: 2/24/17  Baseline SR, A fib with RVR which was symptomatic.      Procedures:  1. Electrophysiology study with radiofrequency ablation of atrial fibrillation and pulmonary vein isolation, additional ablation with creation of a roof line - 8/11/20 Dr. Josselyn Young and Plan:      Paroxysmal atrial fibrillation            - s/p PVI ablation, roof line ablation on 8/11/20 with Dr. Jatin Rivera 2 (age and gender), on Eliquis 5mg BID   - 30 day KEVIN outside of 90 day blanking period and off antiarrhythmics showed sinus with PVCs   - monitor for symptoms of A fib (unexplained SOB and/or fatigue and get EKG done to see if there has been recurrence   - unclear what was occurring 3 days ago, could have been A fib, asked her to continue to monitor and let us know if she has similar recurrence in the future                  Hypotension   - history of syncope in the past, has been asymptomatic with her low pressures   - should stay well hydrated and change positions slowly        Thank you for allowing to us to participate in the care of Kvng Cerna. Return in about 1 year (around 1/12/2022) for follow up with [facility] or Dr. Parvez Natarajan).      BRUNILDA Vazquez  [facility], [address] [place] [address]  Phone: [phone]  Fax: [phone]    Electronically signed by BRUNILDA Yin - CNP on 1/12/2021 at 1:46 PM

## 2021-04-26 ENCOUNTER — APPOINTMENT (OUTPATIENT)
Dept: GENERAL RADIOLOGY | Age: 71
End: 2021-04-26
Payer: MEDICARE

## 2021-04-26 ENCOUNTER — HOSPITAL ENCOUNTER (EMERGENCY)
Age: 71
Discharge: HOME | End: 2021-04-26
Payer: MEDICARE

## 2021-04-26 VITALS
BODY MASS INDEX: 20.78 KG/M2 | WEIGHT: 121.69 LBS | OXYGEN SATURATION: 98 % | HEIGHT: 64 IN | RESPIRATION RATE: 13 BRPM | SYSTOLIC BLOOD PRESSURE: 101 MMHG | TEMPERATURE: 98 F | DIASTOLIC BLOOD PRESSURE: 65 MMHG | HEART RATE: 82 BPM

## 2021-04-26 DIAGNOSIS — R00.2 PALPITATIONS: Primary | ICD-10-CM

## 2021-04-26 LAB
A/G RATIO: 1.8 (ref 1.1–2.2)
ALBUMIN SERPL-MCNC: 4.6 G/DL (ref 3.4–5)
ALP BLD-CCNC: 89 U/L (ref 40–129)
ALT SERPL-CCNC: 12 U/L (ref 10–40)
ANION GAP SERPL CALCULATED.3IONS-SCNC: 10 MMOL/L (ref 3–16)
APTT: 37.6 SEC (ref 24.2–36.2)
AST SERPL-CCNC: 18 U/L (ref 15–37)
BASOPHILS ABSOLUTE: 0 K/UL (ref 0–0.2)
BASOPHILS RELATIVE PERCENT: 0.3 %
BILIRUB SERPL-MCNC: 0.4 MG/DL (ref 0–1)
BUN BLDV-MCNC: 13 MG/DL (ref 7–20)
CALCIUM SERPL-MCNC: 9.8 MG/DL (ref 8.3–10.6)
CHLORIDE BLD-SCNC: 105 MMOL/L (ref 99–110)
CO2: 27 MMOL/L (ref 21–32)
CREAT SERPL-MCNC: 0.9 MG/DL (ref 0.6–1.2)
EOSINOPHILS ABSOLUTE: 0.1 K/UL (ref 0–0.6)
EOSINOPHILS RELATIVE PERCENT: 1.3 %
GFR AFRICAN AMERICAN: >60
GFR NON-AFRICAN AMERICAN: >60
GLOBULIN: 2.5 G/DL
GLUCOSE BLD-MCNC: 97 MG/DL (ref 70–99)
HCT VFR BLD CALC: 40.5 % (ref 36–48)
HEMOGLOBIN: 13.9 G/DL (ref 12–16)
INR BLD: 1.15 (ref 0.86–1.14)
LYMPHOCYTES ABSOLUTE: 1 K/UL (ref 1–5.1)
LYMPHOCYTES RELATIVE PERCENT: 13.2 %
MCH RBC QN AUTO: 30 PG (ref 26–34)
MCHC RBC AUTO-ENTMCNC: 34.3 G/DL (ref 31–36)
MCV RBC AUTO: 87.4 FL (ref 80–100)
MONOCYTES ABSOLUTE: 0.4 K/UL (ref 0–1.3)
MONOCYTES RELATIVE PERCENT: 4.9 %
NEUTROPHILS ABSOLUTE: 5.9 K/UL (ref 1.7–7.7)
NEUTROPHILS RELATIVE PERCENT: 80.3 %
PDW BLD-RTO: 13 % (ref 12.4–15.4)
PLATELET # BLD: 168 K/UL (ref 135–450)
PMV BLD AUTO: 9.5 FL (ref 5–10.5)
POTASSIUM REFLEX MAGNESIUM: 4.3 MMOL/L (ref 3.5–5.1)
PRO-BNP: 100 PG/ML (ref 0–124)
PROTHROMBIN TIME: 13.4 SEC (ref 10–13.2)
RBC # BLD: 4.63 M/UL (ref 4–5.2)
SODIUM BLD-SCNC: 142 MMOL/L (ref 136–145)
TOTAL PROTEIN: 7.1 G/DL (ref 6.4–8.2)
TROPONIN: <0.01 NG/ML
WBC # BLD: 7.3 K/UL (ref 4–11)

## 2021-04-26 PROCEDURE — 85610 PROTHROMBIN TIME: CPT

## 2021-04-26 PROCEDURE — 85025 COMPLETE CBC W/AUTO DIFF WBC: CPT

## 2021-04-26 PROCEDURE — 85730 THROMBOPLASTIN TIME PARTIAL: CPT

## 2021-04-26 PROCEDURE — 99284 EMERGENCY DEPT VISIT MOD MDM: CPT

## 2021-04-26 PROCEDURE — 93005 ELECTROCARDIOGRAM TRACING: CPT | Performed by: EMERGENCY MEDICINE

## 2021-04-26 PROCEDURE — 80053 COMPREHEN METABOLIC PANEL: CPT

## 2021-04-26 PROCEDURE — 84484 ASSAY OF TROPONIN QUANT: CPT

## 2021-04-26 PROCEDURE — 71045 X-RAY EXAM CHEST 1 VIEW: CPT

## 2021-04-26 PROCEDURE — 83880 ASSAY OF NATRIURETIC PEPTIDE: CPT

## 2021-04-26 ASSESSMENT — PAIN SCALES - GENERAL: PAINLEVEL_OUTOF10: 0

## 2021-04-26 NOTE — PROGRESS NOTES
[address]   Electrophysiology      Date: 4/28/2021    Chief Complaint:   Chief Complaint   Patient presents with    Follow-Up from EBON[facility]ICKEY     seen in ED d/t palpitations     History of Present Illness:    I saw Chelo Mei in the office for electrophysiology follow up today. She is a 69 y.o. female with a past medical history of paroxysmal atrial fibrillation, syncope and hypotension. She underwent EP study, ablation of atrial fibrillation with pulmonary vein isolation, additional ablation with creation of a roof line on 8/11/20 with Dr. Belia Thornton. Her flecainide and Toprol were stopped when she was out past her 90 day blanking period and she wore a 30 day event monitor. Monitor showed sinus with some PVCs. She was recently in the ED at [facility] with palpitations. EKG showed SR. She does have palpitations about once every couple weeks and she thinks it feels like a \"skipped beat. \" This can occur for several seconds and makes her feel really tired. When she went to the ED, it lasted for several minutes which was unusual. By the time she was seen in the ED, it had essentially resolved. Denies any syncope. Continues to have periods of hypotension which she can usually correct with increased hydration, caffeine or eating more salt. Allergies:  No Known Allergies  Home Medications:  Prior to Visit Medications    Medication Sig Taking? Authorizing Provider   metoprolol succinate (TOPROL XL) 25 MG extended release tablet Take 0.5 tablets by mouth daily as needed (palpitations) Yes BRUNILDA Love - CNP   famotidine (PEPCID) 20 MG tablet Take 20 mg by mouth 2 times daily Yes Historical Provider, MD   apixaban (ELIQUIS) 5 MG TABS tablet Take 1 tablet by mouth 2 times daily Yes Mark Callahan MD   Calcium-Magnesium-Vitamin D (CALCIUM 500 PO) Take 500 mg by mouth daily Yes Historical Provider, MD   calcitonin, salmon, (MIACALCIN) 200 UNIT/ACT nasal spray 1 spray by Nasal route daily.  Yes Historical MD Cliff        Past Medical History:  Past Medical History:   Diagnosis Date    Acid reflux     Atrial fibrillation [facility])        Past Surgical History:   Past Surgical History:   Procedure Laterality Date    COLONOSCOPY  11/04/2014    ENDOSCOPY, COLON, DIAGNOSTIC  11/04/2014    with biopsy   [facility] [address] Dr, Dr.       Social History:   reports that she has never smoked. She has never used smokeless tobacco. She reports that she does not drink alcohol or use drugs. Family History:      Problem Relation Age of Onset    Other Mother     Other Father     Heart Disease Father     Heart Disease Brother        Review of Systems   Constitutional: Negative for chills, fatigue, fever and unexpected weight change. HENT: Negative for congestion, hearing loss, sinus pressure, sore throat and trouble swallowing. Respiratory: Negative for cough, shortness of breath and wheezing. Cardiovascular: Positive for palpitations. Negative for chest pain and leg swelling. Gastrointestinal: Negative for abdominal pain, blood in stool, constipation, diarrhea, nausea and vomiting. Genitourinary: Negative for hematuria. Musculoskeletal: Negative for arthralgias, back pain, gait problem and myalgias. Skin: Negative for color change, rash and wound. Neurological: Negative for dizziness, seizures, syncope, speech difficulty, weakness and light-headedness. Hematological: Does not bruise/bleed easily. Physical Examination:  Vitals:    04/28/21 1024   BP: 104/60   Pulse: 76   SpO2: 97%      Wt Readings from Last 3 Encounters:   04/28/21 123 lb (55.8 kg)   04/26/21 121 lb 11.1 oz (55.2 kg)   01/12/21 119 lb (54 kg)       Physical Exam  Vitals signs reviewed. Constitutional:       General: She is not in acute distress. Appearance: Normal appearance.    HENT: Head: Normocephalic and atraumatic. Nose: Nose normal.      Mouth/Throat:      Mouth: Mucous membranes are moist.   Eyes:      Conjunctiva/sclera: Conjunctivae normal.      Pupils: Pupils are equal, round, and reactive to light. Cardiovascular:      Rate and Rhythm: Normal rate and regular rhythm. Heart sounds: No murmur. No friction rub. No gallop. Pulmonary:      Effort: No respiratory distress. Breath sounds: No wheezing, rhonchi or rales. Abdominal:      General: Abdomen is flat. Bowel sounds are normal.      Palpations: Abdomen is soft. Musculoskeletal: Normal range of motion. Right lower leg: No edema. Left lower leg: No edema. Skin:     General: Skin is warm and dry. Findings: No bruising. Neurological:      General: No focal deficit present. Mental Status: She is alert and oriented to person, place, and time. Motor: No weakness. Psychiatric:         Mood and Affect: Mood normal.         Behavior: Behavior normal.          Pertinent labs, diagnostic, device, and imaging results reviewed as a part of this visit    LABS    CBC:   Lab Results   Component Value Date    WBC 7.3 04/26/2021    HGB 13.9 04/26/2021    HCT 40.5 04/26/2021    MCV 87.4 04/26/2021     04/26/2021     BMP:   Lab Results   Component Value Date    CREATININE 0.9 04/26/2021    BUN 13 04/26/2021     04/26/2021    K 4.3 04/26/2021     04/26/2021    CO2 27 04/26/2021     Estimated Creatinine Clearance: 50 mL/min (based on SCr of 0.9 mg/dL).    No results found for: BNP    Thyroid:   Lab Results   Component Value Date    TSH 2.34 12/16/2016     Lipid Panel: No results found for: CHOL, HDL, TRIG  LFTs:  Lab Results   Component Value Date    ALT 12 04/26/2021    AST 18 04/26/2021    ALKPHOS 89 04/26/2021    BILITOT 0.4 04/26/2021     Coags:   Lab Results   Component Value Date    PROTIME 13.4 (H) 04/26/2021    INR 1.15 (H) 04/26/2021    APTT 37.6 (H) 04/26/2021       ECG: 4/28/2021   SR at 66 BPM.     Echo:12/16/16   Conclusions      Summary   Left ventricle size is normal.   Normal left ventricular wall thickness.   Ejection fraction is visually estimated to be 60%.   Normal right ventricular size and function.   No significant valvular disease.     Stress: 2/7/17  Conclusions          Summary     Normal myocardial perfusion study.     Normal LV size and systolic function.  ECG portion of stress test is normal.     Alford score equals 4.     Overall findings represent a low risk scan.       Cardiac event monitor: 2/24/17  Baseline SR, A fib with RVR which was symptomatic.      Procedures:  1. Electrophysiology study with radiofrequency ablation of atrial fibrillation and pulmonary vein isolation, additional ablation with creation of a roof line - 8/11/20 Dr. Rubin Snare and Plan:      Palpitations   - likely secondary to ectopic beats which were seen on 30 day monitor and were associated with similar symptoms at that time   - reassured her that these are not harmful and likely do not represent A fib   - given hypotension and infrequent symptoms, can use PRN Toprol 12.5mg for prolonged symptoms    Paroxysmal atrial fibrillation            - s/p PVI ablation, roof line ablation on 8/11/20 with Dr. Luis Santiago 2 (age and gender), on Eliquis 5mg BID   - 30 day KEVIN outside of 90 day blanking period and off antiarrhythmics showed sinus with PVCs   - monitor for symptoms of A fib (unexplained SOB and/or fatigue) and get EKG done to see if there has been recurrence                  Hypotension   - history of syncope in the past, has been asymptomatic with her low pressures   - should stay well hydrated and change positions slowly        Thank you for allowing to us to participate in the care of Rohini Vargas. Return for an appointment with Klaus Velez NP as previously planned in January 2022.      BRUNILDA Hunter  [facility]  [address]

## 2021-04-26 NOTE — ED PROVIDER NOTES
Per my interpretation:    Electrocardiogram (ECG) 4/26/2021 924  RATE: normal  RHYTHM: normal sinus  AXIS: normal  INTERVALS: normal  ST-T WAVE CHANGES: No evidence of ST segment elevation or T-wave inversion  Prior for comparison 1/12/2021     Parish Ma MD  04/26/21 1599

## 2021-04-26 NOTE — ED PROVIDER NOTES
[address]        Pt Name: Prasanna Johnson  MRN: [id number]  [place] 1950  Date of evaluation: 4/26/2021  Provider: BRUNILDA Fernandez - JAELYN  PCP: Tamika Dave DO    BRYAN. I have evaluated this patient. My supervising physician was available for consultation. CHIEF COMPLAINT       Chief Complaint   Patient presents with    Palpitations     pt states her heart rate was high and she was having a \"afib attack\"     Chest Pain    Dizziness       HISTORY OF PRESENT ILLNESS   (Location, Timing/Onset, Context/Setting, Quality, Duration, Modifying Factors, Severity, Associated Signs and Symptoms)  Note limiting factors. Prasanna Johnson is a 69 y.o. female who presents to the emergency department today reporting palpitations which began this morning around 8 AM and lasted for approximately 1 hour. Patient does report history of atrial fibrillation. She takes Eliquis for this. Patient reports of recent ablation. She states that she keeps a daily log of her heart rate and blood pressure. She adamantly denied having any chest pain or shortness of breath during these episodes. There has been no recent travel or known sick contacts. She denies cough. She denies having any lower extremity pain or swelling. She denies recent fever, chills, or other symptoms. Patient denies having any palpitations at present. Nursing Notes were all reviewed and agreed with or any disagreements were addressed in the HPI. REVIEW OF SYSTEMS    (2-9 systems for level 4, 10 or more for level 5)     Review of Systems   Constitutional: Negative for chills and fever. HENT: Negative for congestion and sore throat. Eyes: Negative for pain and visual disturbance. Respiratory: Negative for chest tightness and shortness of breath. Cardiovascular: Positive for palpitations. Negative for chest pain and leg swelling.    Gastrointestinal: Negative for abdominal pain, nausea and vomiting. Endocrine: Negative for polydipsia and polyuria. Genitourinary: Negative for difficulty urinating and dysuria. Musculoskeletal: Negative for back pain and neck pain. Skin: Negative for wound. Allergic/Immunologic: Negative for immunocompromised state. Neurological: Negative for dizziness, weakness and headaches. Hematological: Does not bruise/bleed easily. Psychiatric/Behavioral: Negative for suicidal ideas. Positives and Pertinent negatives as per HPI. Except as noted above in the ROS, all other systems were reviewed and negative. PAST MEDICAL HISTORY     Past Medical History:   Diagnosis Date    Acid reflux     Atrial fibrillation [facility])          SURGICAL HISTORY     Past Surgical History:   Procedure Laterality Date    COLONOSCOPY  11/04/2014    ENDOSCOPY, COLON, DIAGNOSTIC  11/04/2014    with biopsy   [facility] Dr, Dr.       Discharge Medication List as of 4/26/2021  1:18 PM      CONTINUE these medications which have NOT CHANGED    Details   famotidine (PEPCID) 20 MG tablet Take 20 mg by mouth 2 times dailyHistorical Med      apixaban (ELIQUIS) 5 MG TABS tablet Take 1 tablet by mouth 2 times daily, Disp-180 tablet, R-3Normal      Calcium-Magnesium-Vitamin D (CALCIUM 500 PO) Take 500 mg by mouth daily      calcitonin, salmon, (MIACALCIN) 200 UNIT/ACT nasal spray 1 spray by Nasal route daily. ALLERGIES     Patient has no known allergies.     FAMILYHISTORY       Family History   Problem Relation Age of Onset    Other Mother     Other Father     Heart Disease Father     Heart Disease Brother           SOCIAL HISTORY       Social History     Tobacco Use    Smoking status: Never Smoker    Smokeless tobacco: Never Used   Substance Use Topics  Alcohol use: No    Drug use: No       SCREENINGS             PHYSICAL EXAM    (up to 7 for level 4, 8 or more for level 5)     ED Triage Vitals [04/26/21 0913]   BP Temp Temp Source Pulse Resp SpO2 Height Weight   115/82 98 °F (36.7 °C) Oral 102 18 97 % 5' 4\" (1.626 m) 121 lb 11.1 oz (55.2 kg)       Physical Exam  Vitals signs and nursing note reviewed. Constitutional:       Appearance: Normal appearance. HENT:      Head: Normocephalic and atraumatic. Right Ear: External ear normal.      Left Ear: External ear normal.      Nose: Nose normal.      Mouth/Throat:      Mouth: Mucous membranes are moist.   Eyes:      General:         Right eye: No discharge. Left eye: No discharge. Extraocular Movements: Extraocular movements intact. Neck:      Musculoskeletal: Normal range of motion and neck supple. Cardiovascular:      Rate and Rhythm: Normal rate and regular rhythm. Pulses: Normal pulses. Heart sounds: Normal heart sounds. Pulmonary:      Effort: Pulmonary effort is normal. No respiratory distress. Abdominal:      General: Bowel sounds are normal.      Palpations: Abdomen is soft. Tenderness: There is no abdominal tenderness. Musculoskeletal: Normal range of motion. Skin:     General: Skin is warm and dry. Capillary Refill: Capillary refill takes less than 2 seconds. Neurological:      General: No focal deficit present. Mental Status: She is alert and oriented to person, place, and time. Psychiatric:         Behavior: Behavior normal.         Thought Content:  Thought content normal.         Judgment: Judgment normal.      Comments: Mood anxious         DIAGNOSTIC RESULTS   LABS:    Labs Reviewed   PROTIME-INR - Abnormal; Notable for the following components:       Result Value    Protime 13.4 (*)     INR 1.15 (*)     All other components within normal limits    Narrative:     Performed at:  [address] FINDINGS: Normal cardiomediastinal silhouette. Hyperinflated lungs. No focal consolidation. No pleural effusion or pneumothorax. Bones grossly intact. Hyperinflated lungs. PROCEDURES   Unless otherwise noted below, none     Procedures    CRITICAL CARE TIME   N/A    CONSULTS:  IP CONSULT TO CARDIOLOGY      EMERGENCY DEPARTMENT COURSE and DIFFERENTIAL DIAGNOSIS/MDM:   Vitals:    Vitals:    04/26/21 1215 04/26/21 1230 04/26/21 1245 04/26/21 1300   BP: 105/72 112/73 112/70 101/65   Pulse: 80 83 74 82   Resp: 15 15 17 13   Temp:       TempSrc:       SpO2: 98% 99% 97% 98%   Weight:       Height:           Patient was given the following medications:  Medications - No data to display    Nursing notes reviewed. This is a 76-year-old female with history of atrial fibrillation with recent ablation who presents for evaluation of palpitations which lasted approximately 45 minutes to an hour. She adamantly denied having any chest pain or shortness of breath. Physical exam complete. Patient is nontoxic, afebrile, normotensive. She appears mildly anxious. There is no signs or symptoms of acute distress noted. EKG interpreted by ED physician reviewed by myself is negative for acute ST elevation. Portable chest x-ray is negative. Laboratory studies have been unremarkable. Patient has remained hemodynamically stable throughout ED visit and has been without any further episodes of palpitations while here in the ED. 13:00-spoke with patient's cardiologist who advises that patient may be safely discharged with emphasis on close outpatient follow-up. Patient is to call the office tomorrow morning to get in this week. At this time there is no evidence of any life-threatening or emergent conditions requiring immediate intervention. She will be discharged with emphasis on close outpatient follow-up. She agrees return to nearest ED for high fever, sudden vomiting, severe pain, any other worsening symptoms. She is in agreement with this plan of care. FINAL IMPRESSION      1.  Palpitations          DISPOSITION/PLAN   DISPOSITION Decision To Discharge 04/26/2021 01:14:43 PM      PATIENT REFERREDTO:  BRUNILDA Oseguera CNP  [address]  [phone]    Today  your cardiologist      DISCHARGE MEDICATIONS:  Discharge Medication List as of 4/26/2021  1:18 PM          DISCONTINUED MEDICATIONS:  Discharge Medication List as of 4/26/2021  1:18 PM                 (Please note that portions of this note were completed with a voice recognition program.  Efforts were made to edit the dictations but occasionally words are mis-transcribed.)    BRUNILDA Og CNP (electronically signed)           BRUNILDA Og CNP  04/29/21 8581

## 2021-04-27 LAB
EKG ATRIAL RATE: 89 BPM
EKG DIAGNOSIS: NORMAL
EKG P AXIS: 79 DEGREES
EKG P-R INTERVAL: 196 MS
EKG Q-T INTERVAL: 352 MS
EKG QRS DURATION: 72 MS
EKG QTC CALCULATION (BAZETT): 428 MS
EKG R AXIS: 37 DEGREES
EKG T AXIS: 63 DEGREES
EKG VENTRICULAR RATE: 89 BPM

## 2021-04-27 PROCEDURE — 93010 ELECTROCARDIOGRAM REPORT: CPT | Performed by: INTERNAL MEDICINE

## 2021-04-28 ENCOUNTER — OFFICE VISIT (OUTPATIENT)
Dept: CARDIOLOGY CLINIC | Age: 71
End: 2021-04-28
Payer: MEDICARE

## 2021-04-28 VITALS
HEART RATE: 76 BPM | SYSTOLIC BLOOD PRESSURE: 104 MMHG | DIASTOLIC BLOOD PRESSURE: 60 MMHG | BODY MASS INDEX: 21 KG/M2 | HEIGHT: 64 IN | OXYGEN SATURATION: 97 % | WEIGHT: 123 LBS

## 2021-04-28 DIAGNOSIS — I48.0 PAROXYSMAL ATRIAL FIBRILLATION (HCC): ICD-10-CM

## 2021-04-28 DIAGNOSIS — R00.2 PALPITATION: Primary | ICD-10-CM

## 2021-04-28 DIAGNOSIS — Z86.79 S/P ABLATION OF ATRIAL FIBRILLATION: ICD-10-CM

## 2021-04-28 DIAGNOSIS — Z98.890 S/P ABLATION OF ATRIAL FIBRILLATION: ICD-10-CM

## 2021-04-28 PROCEDURE — 4040F PNEUMOC VAC/ADMIN/RCVD: CPT | Performed by: NURSE PRACTITIONER

## 2021-04-28 PROCEDURE — 1123F ACP DISCUSS/DSCN MKR DOCD: CPT | Performed by: NURSE PRACTITIONER

## 2021-04-28 PROCEDURE — 99214 OFFICE O/P EST MOD 30 MIN: CPT | Performed by: NURSE PRACTITIONER

## 2021-04-28 PROCEDURE — 3017F COLORECTAL CA SCREEN DOC REV: CPT | Performed by: NURSE PRACTITIONER

## 2021-04-28 PROCEDURE — G8399 PT W/DXA RESULTS DOCUMENT: HCPCS | Performed by: NURSE PRACTITIONER

## 2021-04-28 PROCEDURE — 1090F PRES/ABSN URINE INCON ASSESS: CPT | Performed by: NURSE PRACTITIONER

## 2021-04-28 PROCEDURE — G8420 CALC BMI NORM PARAMETERS: HCPCS | Performed by: NURSE PRACTITIONER

## 2021-04-28 PROCEDURE — 93000 ELECTROCARDIOGRAM COMPLETE: CPT | Performed by: NURSE PRACTITIONER

## 2021-04-28 PROCEDURE — G8427 DOCREV CUR MEDS BY ELIG CLIN: HCPCS | Performed by: NURSE PRACTITIONER

## 2021-04-28 PROCEDURE — 1036F TOBACCO NON-USER: CPT | Performed by: NURSE PRACTITIONER

## 2021-04-28 RX ORDER — METOPROLOL SUCCINATE 25 MG/1
12.5 TABLET, EXTENDED RELEASE ORAL DAILY PRN
Qty: 30 TABLET | Refills: 3 | Status: SHIPPED | OUTPATIENT
Start: 2021-04-28 | End: 2021-12-09 | Stop reason: SDUPTHER

## 2021-04-28 ASSESSMENT — ENCOUNTER SYMPTOMS
WHEEZING: 0
TROUBLE SWALLOWING: 0
VOMITING: 0
BLOOD IN STOOL: 0
ABDOMINAL PAIN: 0
SHORTNESS OF BREATH: 0
BACK PAIN: 0
COLOR CHANGE: 0
SORE THROAT: 0
DIARRHEA: 0
COUGH: 0
NAUSEA: 0
CONSTIPATION: 0
SINUS PRESSURE: 0

## 2021-04-29 ENCOUNTER — HOSPITAL ENCOUNTER (OUTPATIENT)
Dept: WOMENS IMAGING | Age: 71
Discharge: HOME | End: 2021-04-29
Payer: MEDICARE

## 2021-04-29 DIAGNOSIS — Z12.31 BREAST CANCER SCREENING BY MAMMOGRAM: ICD-10-CM

## 2021-04-29 PROCEDURE — 77063 BREAST TOMOSYNTHESIS BI: CPT

## 2021-04-29 ASSESSMENT — ENCOUNTER SYMPTOMS
SHORTNESS OF BREATH: 0
VOMITING: 0
CHEST TIGHTNESS: 0
BACK PAIN: 0
ABDOMINAL PAIN: 0
EYE PAIN: 0
SORE THROAT: 0
NAUSEA: 0

## 2021-07-12 ENCOUNTER — APPOINTMENT (OUTPATIENT)
Dept: GENERAL RADIOLOGY | Age: 71
End: 2021-07-12
Payer: MEDICARE

## 2021-07-12 ENCOUNTER — HOSPITAL ENCOUNTER (OUTPATIENT)
Age: 71
Setting detail: OBSERVATION
Discharge: HOME | End: 2021-07-13
Attending: STUDENT IN AN ORGANIZED HEALTH CARE EDUCATION/TRAINING PROGRAM | Admitting: HOSPITALIST
Payer: MEDICARE

## 2021-07-12 DIAGNOSIS — R55 NEAR SYNCOPE: Primary | ICD-10-CM

## 2021-07-12 LAB
A/G RATIO: 1.5 (ref 1.1–2.2)
ALBUMIN SERPL-MCNC: 3.9 G/DL (ref 3.4–5)
ALP BLD-CCNC: 85 U/L (ref 40–129)
ALT SERPL-CCNC: 16 U/L (ref 10–40)
ANION GAP SERPL CALCULATED.3IONS-SCNC: 9 MMOL/L (ref 3–16)
AST SERPL-CCNC: 20 U/L (ref 15–37)
BASOPHILS ABSOLUTE: 0 K/UL (ref 0–0.2)
BASOPHILS RELATIVE PERCENT: 0.3 %
BILIRUB SERPL-MCNC: <0.2 MG/DL (ref 0–1)
BILIRUBIN URINE: NEGATIVE
BLOOD, URINE: NEGATIVE
BUN BLDV-MCNC: 9 MG/DL (ref 7–20)
CALCIUM SERPL-MCNC: 9.1 MG/DL (ref 8.3–10.6)
CHLORIDE BLD-SCNC: 107 MMOL/L (ref 99–110)
CLARITY: CLEAR
CO2: 25 MMOL/L (ref 21–32)
COLOR: YELLOW
CREAT SERPL-MCNC: 0.8 MG/DL (ref 0.6–1.2)
EOSINOPHILS ABSOLUTE: 0.1 K/UL (ref 0–0.6)
EOSINOPHILS RELATIVE PERCENT: 2.2 %
EPITHELIAL CELLS, UA: 1 /HPF (ref 0–5)
GFR AFRICAN AMERICAN: >60
GFR NON-AFRICAN AMERICAN: >60
GLOBULIN: 2.6 G/DL
GLUCOSE BLD-MCNC: 102 MG/DL (ref 70–99)
GLUCOSE URINE: NEGATIVE MG/DL
HCT VFR BLD CALC: 37.3 % (ref 36–48)
HEMOGLOBIN: 12.9 G/DL (ref 12–16)
HYALINE CASTS: 0 /LPF (ref 0–8)
KETONES, URINE: NEGATIVE MG/DL
LEUKOCYTE ESTERASE, URINE: ABNORMAL
LYMPHOCYTES ABSOLUTE: 1.2 K/UL (ref 1–5.1)
LYMPHOCYTES RELATIVE PERCENT: 22.6 %
MAGNESIUM: 1.9 MG/DL (ref 1.8–2.4)
MCH RBC QN AUTO: 30.5 PG (ref 26–34)
MCHC RBC AUTO-ENTMCNC: 34.6 G/DL (ref 31–36)
MCV RBC AUTO: 88.1 FL (ref 80–100)
MICROSCOPIC EXAMINATION: YES
MONOCYTES ABSOLUTE: 0.4 K/UL (ref 0–1.3)
MONOCYTES RELATIVE PERCENT: 7.1 %
NEUTROPHILS ABSOLUTE: 3.5 K/UL (ref 1.7–7.7)
NEUTROPHILS RELATIVE PERCENT: 67.8 %
NITRITE, URINE: NEGATIVE
PDW BLD-RTO: 13.1 % (ref 12.4–15.4)
PH UA: 7.5 (ref 5–8)
PHOSPHORUS: 1.9 MG/DL (ref 2.5–4.9)
PLATELET # BLD: 175 K/UL (ref 135–450)
PMV BLD AUTO: 9.6 FL (ref 5–10.5)
POTASSIUM REFLEX MAGNESIUM: 3.9 MMOL/L (ref 3.5–5.1)
PRO-BNP: 132 PG/ML (ref 0–124)
PROTEIN UA: NEGATIVE MG/DL
RBC # BLD: 4.24 M/UL (ref 4–5.2)
RBC UA: 0 /HPF (ref 0–4)
SODIUM BLD-SCNC: 141 MMOL/L (ref 136–145)
SPECIFIC GRAVITY UA: 1 (ref 1–1.03)
TOTAL PROTEIN: 6.5 G/DL (ref 6.4–8.2)
TROPONIN: <0.01 NG/ML
TROPONIN: <0.01 NG/ML
TSH SERPL DL<=0.05 MIU/L-ACNC: 2.44 UIU/ML (ref 0.27–4.2)
URINE REFLEX TO CULTURE: ABNORMAL
URINE TYPE: ABNORMAL
UROBILINOGEN, URINE: 0.2 E.U./DL
WBC # BLD: 5.2 K/UL (ref 4–11)
WBC UA: 1 /HPF (ref 0–5)

## 2021-07-12 PROCEDURE — 83735 ASSAY OF MAGNESIUM: CPT

## 2021-07-12 PROCEDURE — 81001 URINALYSIS AUTO W/SCOPE: CPT

## 2021-07-12 PROCEDURE — 85025 COMPLETE CBC W/AUTO DIFF WBC: CPT

## 2021-07-12 PROCEDURE — 71045 X-RAY EXAM CHEST 1 VIEW: CPT

## 2021-07-12 PROCEDURE — 93005 ELECTROCARDIOGRAM TRACING: CPT | Performed by: STUDENT IN AN ORGANIZED HEALTH CARE EDUCATION/TRAINING PROGRAM

## 2021-07-12 PROCEDURE — 36415 COLL VENOUS BLD VENIPUNCTURE: CPT

## 2021-07-12 PROCEDURE — G0378 HOSPITAL OBSERVATION PER HR: HCPCS

## 2021-07-12 PROCEDURE — 84443 ASSAY THYROID STIM HORMONE: CPT

## 2021-07-12 PROCEDURE — 2580000003 HC RX 258: Performed by: HOSPITALIST

## 2021-07-12 PROCEDURE — 99285 EMERGENCY DEPT VISIT HI MDM: CPT

## 2021-07-12 PROCEDURE — 2580000003 HC RX 258: Performed by: STUDENT IN AN ORGANIZED HEALTH CARE EDUCATION/TRAINING PROGRAM

## 2021-07-12 PROCEDURE — 80053 COMPREHEN METABOLIC PANEL: CPT

## 2021-07-12 PROCEDURE — 84100 ASSAY OF PHOSPHORUS: CPT

## 2021-07-12 PROCEDURE — 6370000000 HC RX 637 (ALT 250 FOR IP): Performed by: HOSPITALIST

## 2021-07-12 PROCEDURE — 83880 ASSAY OF NATRIURETIC PEPTIDE: CPT

## 2021-07-12 PROCEDURE — 84484 ASSAY OF TROPONIN QUANT: CPT

## 2021-07-12 RX ORDER — 0.9 % SODIUM CHLORIDE 0.9 %
1000 INTRAVENOUS SOLUTION INTRAVENOUS ONCE
Status: COMPLETED | OUTPATIENT
Start: 2021-07-12 | End: 2021-07-12

## 2021-07-12 RX ORDER — SODIUM CHLORIDE 9 MG/ML
INJECTION, SOLUTION INTRAVENOUS CONTINUOUS
Status: DISCONTINUED | OUTPATIENT
Start: 2021-07-12 | End: 2021-07-13

## 2021-07-12 RX ORDER — SODIUM CHLORIDE 0.9 % (FLUSH) 0.9 %
5-40 SYRINGE (ML) INJECTION EVERY 12 HOURS SCHEDULED
Status: DISCONTINUED | OUTPATIENT
Start: 2021-07-12 | End: 2021-07-13

## 2021-07-12 RX ORDER — ACETAMINOPHEN 325 MG/1
650 TABLET ORAL EVERY 6 HOURS PRN
Status: DISCONTINUED | OUTPATIENT
Start: 2021-07-12 | End: 2021-07-13 | Stop reason: HOSPADM

## 2021-07-12 RX ORDER — METOPROLOL SUCCINATE 25 MG/1
12.5 TABLET, EXTENDED RELEASE ORAL DAILY PRN
Status: DISCONTINUED | OUTPATIENT
Start: 2021-07-12 | End: 2021-07-13 | Stop reason: HOSPADM

## 2021-07-12 RX ORDER — SODIUM CHLORIDE 9 MG/ML
25 INJECTION, SOLUTION INTRAVENOUS PRN
Status: DISCONTINUED | OUTPATIENT
Start: 2021-07-12 | End: 2021-07-13 | Stop reason: SDUPTHER

## 2021-07-12 RX ORDER — SODIUM CHLORIDE 0.9 % (FLUSH) 0.9 %
5-40 SYRINGE (ML) INJECTION PRN
Status: DISCONTINUED | OUTPATIENT
Start: 2021-07-12 | End: 2021-07-13 | Stop reason: SDUPTHER

## 2021-07-12 RX ORDER — ACETAMINOPHEN 650 MG/1
650 SUPPOSITORY RECTAL EVERY 6 HOURS PRN
Status: DISCONTINUED | OUTPATIENT
Start: 2021-07-12 | End: 2021-07-13 | Stop reason: HOSPADM

## 2021-07-12 RX ORDER — CALCITONIN SALMON 200 [IU]/.09ML
1 SPRAY, METERED NASAL DAILY
Status: DISCONTINUED | OUTPATIENT
Start: 2021-07-13 | End: 2021-07-13 | Stop reason: HOSPADM

## 2021-07-12 RX ORDER — FAMOTIDINE 20 MG/1
20 TABLET, FILM COATED ORAL 2 TIMES DAILY
Status: DISCONTINUED | OUTPATIENT
Start: 2021-07-12 | End: 2021-07-13 | Stop reason: HOSPADM

## 2021-07-12 RX ORDER — POLYETHYLENE GLYCOL 3350 17 G/17G
17 POWDER, FOR SOLUTION ORAL DAILY PRN
Status: DISCONTINUED | OUTPATIENT
Start: 2021-07-12 | End: 2021-07-13 | Stop reason: HOSPADM

## 2021-07-12 RX ORDER — ONDANSETRON 4 MG/1
4 TABLET, ORALLY DISINTEGRATING ORAL EVERY 8 HOURS PRN
Status: DISCONTINUED | OUTPATIENT
Start: 2021-07-12 | End: 2021-07-13 | Stop reason: HOSPADM

## 2021-07-12 RX ORDER — ONDANSETRON 2 MG/ML
4 INJECTION INTRAMUSCULAR; INTRAVENOUS EVERY 6 HOURS PRN
Status: DISCONTINUED | OUTPATIENT
Start: 2021-07-12 | End: 2021-07-13 | Stop reason: HOSPADM

## 2021-07-12 RX ADMIN — APIXABAN 5 MG: 5 TABLET, FILM COATED ORAL at 22:46

## 2021-07-12 RX ADMIN — SODIUM CHLORIDE: 9 INJECTION, SOLUTION INTRAVENOUS at 22:47

## 2021-07-12 RX ADMIN — FAMOTIDINE 20 MG: 20 TABLET, FILM COATED ORAL at 22:46

## 2021-07-12 RX ADMIN — SODIUM CHLORIDE 1000 ML: 9 INJECTION, SOLUTION INTRAVENOUS at 16:20

## 2021-07-12 RX ADMIN — Medication 10 ML: at 22:46

## 2021-07-12 ASSESSMENT — PAIN SCALES - GENERAL: PAINLEVEL_OUTOF10: 0

## 2021-07-12 NOTE — ED PROVIDER NOTES
Primary Care Physician: [id number] [facility]   Attending Physician: No att. providers found     History   Chief Complaint   Patient presents with    Atrial Fibrillation     pt. from home. patient brought by EMS. patient was asleep on porch and woke up not feeling well. EMS was called. Per EMS patient looked to be in SVT/A-Fib. patient took 1/2 tab of metoprolol prior to arrival and EMS medical director came and gave her another 1/2 tab of her prescribed metoprolol . patient currently in Sinus rhythm . EMS placed 18g PIV in right AC and 20g PIV in Left FA. HPI   Rosa Jc is a 66 y.o. female history of atrial fibrillation newly diagnosed Eliquis who presents this afternoon brought by EMS complaining of not feeling well as well as feeling as she was going to pass out. She called EMS and per report it appears as if patient was in SVT/A. fib with rates in the 160s. Patient is supposed to take 12.5 mg of metoprolol which she did. EMS gave her noted 12.5 after contacting the medical director.     Past Medical History:   Diagnosis Date    Acid reflux     Atrial fibrillation [facility])         Past Surgical History:   Procedure Laterality Date    COLONOSCOPY  11/04/2014    ENDOSCOPY, COLON, DIAGNOSTIC  11/04/2014    with biopsy   Cherri Mcginnis Dr., Dr, Dr.        Family History   Problem Relation Age of Onset    Other Mother     Other Father     Heart Disease Father     Heart Disease Brother         Social History     Socioeconomic History    Marital status:      Spouse name: None    Number of children: None    Years of education: None    Highest education level: None   Occupational History    None   Tobacco Use    Smoking status: Never Smoker    Smokeless tobacco: Never Used   Vaping Use    Vaping Use: Never assessed   Substance and Sexual Activity    Alcohol use: No    Drug use: No    Sexual activity: None   Other Topics Concern    None   Social History Narrative    None     Social Determinants of Health     Financial Resource Strain:     Difficulty of Paying Living Expenses:    Food Insecurity:     Worried About Running Out of Food in the Last Year:     [address] in the Last Year:    Transportation Needs:     Lack of Transportation (Medical):  Lack of Transportation (Non-Medical):    Physical Activity:     Days of Exercise per Week:     Minutes of Exercise per Session:    Stress:     Feeling of Stress :    Social Connections:     Frequency of Communication with Friends and Family:     Frequency of Social Gatherings with Friends and Family:     Attends Sabianist Services:     Active Member of Clubs or Organizations:     Attends Club or Organization Meetings:     Marital Status:    Intimate Partner Violence:     Fear of Current or Ex-Partner:     Emotionally Abused:     Physically Abused:     Sexually Abused:         Review of Systems   10 total systems reviewed and found to be negative unless otherwise noted in HPI     Physical Exam   /68   Pulse 92   Temp 97.8 °F (36.6 °C) (Oral)   Resp 16   Ht 5' 4\" (1.626 m)   Wt 123 lb 7.3 oz (56 kg)   SpO2 94%   BMI 21.19 kg/m²      CONSTITUTIONAL: well appearing, in no acute distress   HEAD: atraumatic, normocephalic   EYES: PERRL, No injection, discharge or scleral icterus. ENT: Moist mucous membranes. NECK: Normal ROM, NO LAD   CARDIOVASCULAR: Regular rate and rhythm. No murmurs or gallop. PULMONARY/CHEST: Airway patent. No retractions. Breath sounds clear with good air entry bilaterally. ABDOMEN: Soft, Non-distended and non-tender, without guarding or rebound. SKIN: Itchy rash all over the abdomen but mostly around the groins on both sides  MUSCULOSKELETAL: No leg swelling pulses intact. NEUROLOGICAL: He is but answering questions. Pulses intact. Grossly nonfocal   Nursing note and vitals reviewed.      ED Course & Medical Decision Making   Medications   0.9 % sodium chloride bolus (0 mLs Intravenous Stopped 7/12/21 1858)   technetium tetrofosmin (Tc-MYOVIEW) injection 10 millicurie (10 millicuries Intravenous Given 7/13/21 0858)   technetium tetrofosmin (Tc-MYOVIEW) injection 30 millicurie (30 millicuries Intravenous Given by Other 7/13/21 1056)   chlorhexidine (HIBICLENS) 4 % liquid ( Topical Given by Other 7/13/21 1651)   mupirocin (BACTROBAN) 2 % ointment ( Nasal Given by Other 7/13/21 4072)      Labs Reviewed   COMPREHENSIVE METABOLIC PANEL W/ REFLEX TO MG FOR LOW K - Abnormal; Notable for the following components:       Result Value    Glucose 102 (*)     All other components within normal limits    Narrative:     Performed at:  [address] [employer] [address]   Phone [phone]   URINE RT REFLEX TO CULTURE - Abnormal; Notable for the following components:    Leukocyte Esterase, Urine TRACE (*)     All other components within normal limits    Narrative:     Performed at:  [address] [employer] [address]   Phone [phone]   BRAIN NATRIURETIC PEPTIDE - Abnormal; Notable for the following components:    Pro- (*)     All other components within normal limits    Narrative:     Performed at:  [address] [employer] [address]   Phone [phone]   PHOSPHORUS - Abnormal; Notable for the following components:    Phosphorus 1.9 (*)     All other components within normal limits    Narrative:     Performed at:  [address] [employer] [address]   Phone [phone]   CBC WITH AUTO DIFFERENTIAL - Abnormal; Notable for the following components:    RBC 3.94 (*)     Hemoglobin 11.8 (*)     Hematocrit 34.9 (*)     All other components within normal limits    Narrative:     Performed at:  [facility]  [address] [place], [place] [address]   Phone [phone]   BASIC METABOLIC PANEL W/ REFLEX TO MG FOR LOW K - Abnormal; Notable for the following components:    Chloride 111 (*)     All other components within normal limits    Narrative:     Performed at:  [facility]  [address] [facility] [place], [place] [address]   Phone [phone]   CBC WITH AUTO DIFFERENTIAL    Narrative:     Performed at:  [facility]  [address] [facility] [place], [place] [address]   Phone [phone]   TROPONIN    Narrative:     Performed at:  [facility]  [address] [place] [address]   Phone [phone]   MAGNESIUM    Narrative:     Performed at:  [facility]  [address] [facility] [place], [place] [address]   Phone [phone]   MICROSCOPIC URINALYSIS    Narrative:     Performed at:  [facility]  [address] [facility] [place], [place] [address]   Phone [phone]   TSH WITHOUT REFLEX    Narrative:     Performed at:  [facility]  [address] [facility] [place], [place] [address]   Phone [phone]   TROPONIN    Narrative:     Performed at:  [facility]  [address] [facility] [place], [place] [address]   Phone [phone]   TROPONIN    Narrative:     Performed at:  [facility]  [address] [facility] [place], [place] [address]   Phone [phone]      NM Cardiac Stress Test Nuclear Imaging   Final Result      XR CHEST PORTABLE   Final Result   Stable exam without evidence for acute cardiopulmonary process. Hyperinflated lungs which can be seen with good inspiratory effort but can   also be seen with asthma and in COPD.             XR CHEST PORTABLE    Result Date: 7/12/2021  EXAMINATION: ONE XRAY VIEW OF THE CHEST 7/12/2021 4:23 pm COMPARISON: 04/26/2021. HISTORY: ORDERING SYSTEM PROVIDED HISTORY: CP TECHNOLOGIST PROVIDED HISTORY: Reason for exam:->CP Reason for Exam: chest pain Acuity: Acute Type of Exam: Initial FINDINGS: Lungs are hyperinflated, similar to prior. Lungs are clear. Cardiac and mediastinal silhouettes are within normal limits. No pneumothoraces. Bony structures appear intact. Stable exam without evidence for acute cardiopulmonary process. Hyperinflated lungs which can be seen with good inspiratory effort but can also be seen with asthma and in COPD. EKG INTERPRETATION:  EKG by my preliminary interpretation shows sinus rhythm, normal axis, normal intervals, with no ST changes indicative of ischemia at this time. PROCEDURES:   Procedures    ASSESSMENT AND PLAN:  Gianni Reilly is a 66 y.o. female history of atrial fibrillation newly diagnosed Eliquis who presents this afternoon brought by EMS complaining of not feeling well as well as feeling as she was going to pass out. She called EMS and per report it appears as if patient was in SVT/A. fib with rates in the 160s. Patient is supposed to take 12.5 mg of metoprolol which she did. EMS gave her noted 12.5 after contacting the medical director. On exam nontoxic in no acute distress. Her symptoms have now resolved. Vitals within normal limits labs and EKG with no ischemic changes and patient is back to normal sinuses. Nonetheless, by normal work-up and the fact the patient is back to baseline I am still recommending the patient be admitted. Patient is presenting with near syncope with SVT/A. fib on the EKG taken by the EMS team.  Following the [facility] criteria for syncope requiring admission, I believe that patient meets this criteria. Hospitalist has been consulted pending admission. ClINICAL IMPRESSION:  1.  Near syncope          DISPOSITION Admitted 07/12/2021 08:07:34 PM  -Findings and recommendations explained to patient's daughters. They expressed understanding and agreed with the plan. Karly Mercer MD (electronically signed)  7/15/2021  _________________________________________________________________________________________  Amount and/or Complexity of Data Reviewed:  Clinical lab tests: ordered and reviewed   Tests in the radiology section of CPT®: ordered and reviewed   Tests in the medicine section of CPT®: ordered and reviewed   Decide to obtain previous medical records or to obtain history from someone other than the patient: yes  Obtain history from someone other than the patient: yes  Review and summarize past medical records:yes  I looked up the patient in our electronic medical record:yes  Discuss the patient with other providers:yes  Independent visualization of images, tracings, or specimens:yes  Risk of Complications, Morbidity, and/or Mortality:Moderate  Presenting problems: moderate Diagnostic procedures: moderate yes Management options: moderate       _________________________________________________________________________________________  This record is transcribed utilizing voice recognition technology. There are inherent limitations in this technology. In addition, there may be limitations in editing of this report. If there are any discrepancies, please contact me directly.         Karly Mercer MD  07/12/21 7254       Edel Gaxiola MD  07/15/21 7073

## 2021-07-12 NOTE — ED NOTES
RN received bedside report from Edwar Chung. Patient voices no concerns at this time.        Mike Rivera RN  07/12/21 5105

## 2021-07-12 NOTE — ED NOTES
Bed: D-49  Expected date:   Expected time:   Means of arrival: [facility]  Comments:  70F Tiffany Calixto RN  07/12/21 1235

## 2021-07-13 VITALS
HEART RATE: 92 BPM | RESPIRATION RATE: 16 BRPM | HEIGHT: 64 IN | DIASTOLIC BLOOD PRESSURE: 68 MMHG | SYSTOLIC BLOOD PRESSURE: 112 MMHG | WEIGHT: 123.46 LBS | TEMPERATURE: 97.8 F | BODY MASS INDEX: 21.08 KG/M2 | OXYGEN SATURATION: 94 %

## 2021-07-13 LAB
ANION GAP SERPL CALCULATED.3IONS-SCNC: 9 MMOL/L (ref 3–16)
BASOPHILS ABSOLUTE: 0 K/UL (ref 0–0.2)
BASOPHILS RELATIVE PERCENT: 0.4 %
BUN BLDV-MCNC: 8 MG/DL (ref 7–20)
CALCIUM SERPL-MCNC: 8.7 MG/DL (ref 8.3–10.6)
CHLORIDE BLD-SCNC: 111 MMOL/L (ref 99–110)
CO2: 24 MMOL/L (ref 21–32)
CREAT SERPL-MCNC: 0.7 MG/DL (ref 0.6–1.2)
EKG ATRIAL RATE: 86 BPM
EKG DIAGNOSIS: NORMAL
EKG P AXIS: 74 DEGREES
EKG P-R INTERVAL: 184 MS
EKG Q-T INTERVAL: 364 MS
EKG QRS DURATION: 72 MS
EKG QTC CALCULATION (BAZETT): 435 MS
EKG R AXIS: 31 DEGREES
EKG T AXIS: 72 DEGREES
EKG VENTRICULAR RATE: 86 BPM
EOSINOPHILS ABSOLUTE: 0 K/UL (ref 0–0.6)
EOSINOPHILS RELATIVE PERCENT: 0.7 %
GFR AFRICAN AMERICAN: >60
GFR NON-AFRICAN AMERICAN: >60
GLUCOSE BLD-MCNC: 99 MG/DL (ref 70–99)
HCT VFR BLD CALC: 34.9 % (ref 36–48)
HEMOGLOBIN: 11.8 G/DL (ref 12–16)
LV EF: 81 %
LVEF MODALITY: NORMAL
LYMPHOCYTES ABSOLUTE: 1.2 K/UL (ref 1–5.1)
LYMPHOCYTES RELATIVE PERCENT: 20.1 %
MCH RBC QN AUTO: 30 PG (ref 26–34)
MCHC RBC AUTO-ENTMCNC: 33.8 G/DL (ref 31–36)
MCV RBC AUTO: 88.6 FL (ref 80–100)
MONOCYTES ABSOLUTE: 0.4 K/UL (ref 0–1.3)
MONOCYTES RELATIVE PERCENT: 6.1 %
NEUTROPHILS ABSOLUTE: 4.3 K/UL (ref 1.7–7.7)
NEUTROPHILS RELATIVE PERCENT: 72.7 %
PDW BLD-RTO: 12.9 % (ref 12.4–15.4)
PLATELET # BLD: 164 K/UL (ref 135–450)
PMV BLD AUTO: 10.1 FL (ref 5–10.5)
POTASSIUM REFLEX MAGNESIUM: 3.9 MMOL/L (ref 3.5–5.1)
RBC # BLD: 3.94 M/UL (ref 4–5.2)
SODIUM BLD-SCNC: 144 MMOL/L (ref 136–145)
TROPONIN: <0.01 NG/ML
WBC # BLD: 5.9 K/UL (ref 4–11)

## 2021-07-13 PROCEDURE — 85025 COMPLETE CBC W/AUTO DIFF WBC: CPT

## 2021-07-13 PROCEDURE — 93017 CV STRESS TEST TRACING ONLY: CPT

## 2021-07-13 PROCEDURE — 2580000003 HC RX 258: Performed by: HOSPITALIST

## 2021-07-13 PROCEDURE — 6360000002 HC RX W HCPCS

## 2021-07-13 PROCEDURE — 2500000003 HC RX 250 WO HCPCS

## 2021-07-13 PROCEDURE — G0378 HOSPITAL OBSERVATION PER HR: HCPCS

## 2021-07-13 PROCEDURE — 80048 BASIC METABOLIC PNL TOTAL CA: CPT

## 2021-07-13 PROCEDURE — 6370000000 HC RX 637 (ALT 250 FOR IP): Performed by: INTERNAL MEDICINE

## 2021-07-13 PROCEDURE — 33285 INSJ SUBQ CAR RHYTHM MNTR: CPT | Performed by: INTERNAL MEDICINE

## 2021-07-13 PROCEDURE — 36415 COLL VENOUS BLD VENIPUNCTURE: CPT

## 2021-07-13 PROCEDURE — A9502 TC99M TETROFOSMIN: HCPCS | Performed by: INTERNAL MEDICINE

## 2021-07-13 PROCEDURE — 94760 N-INVAS EAR/PLS OXIMETRY 1: CPT

## 2021-07-13 PROCEDURE — 3430000000 HC RX DIAGNOSTIC RADIOPHARMACEUTICAL: Performed by: INTERNAL MEDICINE

## 2021-07-13 PROCEDURE — C1764 EVENT RECORDER, CARDIAC: HCPCS

## 2021-07-13 PROCEDURE — 84484 ASSAY OF TROPONIN QUANT: CPT

## 2021-07-13 PROCEDURE — 6370000000 HC RX 637 (ALT 250 FOR IP): Performed by: HOSPITALIST

## 2021-07-13 PROCEDURE — 33285 INSJ SUBQ CAR RHYTHM MNTR: CPT | Performed by: FAMILY MEDICINE

## 2021-07-13 PROCEDURE — 93010 ELECTROCARDIOGRAM REPORT: CPT | Performed by: INTERNAL MEDICINE

## 2021-07-13 PROCEDURE — 78452 HT MUSCLE IMAGE SPECT MULT: CPT

## 2021-07-13 RX ORDER — SODIUM CHLORIDE 9 MG/ML
25 INJECTION, SOLUTION INTRAVENOUS PRN
Status: DISCONTINUED | OUTPATIENT
Start: 2021-07-13 | End: 2021-07-13

## 2021-07-13 RX ORDER — CHLORHEXIDINE GLUCONATE 4 G/100ML
SOLUTION TOPICAL ONCE
Status: COMPLETED | OUTPATIENT
Start: 2021-07-13 | End: 2021-07-13

## 2021-07-13 RX ORDER — SODIUM CHLORIDE 0.9 % (FLUSH) 0.9 %
5-40 SYRINGE (ML) INJECTION PRN
Status: DISCONTINUED | OUTPATIENT
Start: 2021-07-13 | End: 2021-07-13 | Stop reason: HOSPADM

## 2021-07-13 RX ORDER — SODIUM CHLORIDE 9 MG/ML
25 INJECTION, SOLUTION INTRAVENOUS PRN
Status: DISCONTINUED | OUTPATIENT
Start: 2021-07-13 | End: 2021-07-13 | Stop reason: HOSPADM

## 2021-07-13 RX ORDER — SODIUM CHLORIDE 0.9 % (FLUSH) 0.9 %
5-40 SYRINGE (ML) INJECTION EVERY 12 HOURS SCHEDULED
Status: DISCONTINUED | OUTPATIENT
Start: 2021-07-13 | End: 2021-07-13 | Stop reason: HOSPADM

## 2021-07-13 RX ORDER — SODIUM CHLORIDE 0.9 % (FLUSH) 0.9 %
5-40 SYRINGE (ML) INJECTION PRN
Status: DISCONTINUED | OUTPATIENT
Start: 2021-07-13 | End: 2021-07-13

## 2021-07-13 RX ADMIN — CHLORHEXIDINE GLUCONATE: 4 SOLUTION TOPICAL at 16:51

## 2021-07-13 RX ADMIN — TETROFOSMIN 30 MILLICURIE: 1.38 INJECTION, POWDER, LYOPHILIZED, FOR SOLUTION INTRAVENOUS at 10:56

## 2021-07-13 RX ADMIN — MUPIROCIN: 20 OINTMENT TOPICAL at 16:52

## 2021-07-13 RX ADMIN — TETROFOSMIN 10 MILLICURIE: 1.38 INJECTION, POWDER, LYOPHILIZED, FOR SOLUTION INTRAVENOUS at 08:58

## 2021-07-13 RX ADMIN — APIXABAN 5 MG: 5 TABLET, FILM COATED ORAL at 08:02

## 2021-07-13 RX ADMIN — SODIUM CHLORIDE: 9 INJECTION, SOLUTION INTRAVENOUS at 13:49

## 2021-07-13 RX ADMIN — FAMOTIDINE 20 MG: 20 TABLET, FILM COATED ORAL at 08:02

## 2021-07-13 ASSESSMENT — PAIN SCALES - GENERAL: PAINLEVEL_OUTOF10: 0

## 2021-07-13 NOTE — CARE COORDINATION
CASE MANAGEMENT DISCHARGE SUMMARY:    DISCHARGE DATE: 7/13/21    DISCHARGED TO: Home with spouse, no needs.      TRANSPORTATION: Spouse to transport             TIME: Patient to coordinate with RN     PREFERRED PHARMACY: [facility]    TASH Olivo, [facility], Social Work/Case Management   [phone]  Electronically signed by TASH Olivo, STEFANIW on 7/13/2021 at 4:15 PM

## 2021-07-13 NOTE — PROGRESS NOTES
Pt arrived via stretcher from ED to room 5280. Heart monitor connected and verified with [facility]. VS, assessment, and admission complete. 4 eyes assessment complete. Pt oriented to unit and room. Call light and bedside table in reach. All questions answered. Pt resting quietly in bed with no complaints or voiced needs at this time. Will continue to monitor.

## 2021-07-13 NOTE — PLAN OF CARE
Problem: Falls - Risk of:  Goal: Will remain free from falls  Description: Will remain free from falls  7/13/2021 0801 by Chase Ramírez RN  Outcome: Ongoing     Problem: Falls - Risk of:  Goal: Absence of physical injury  Description: Absence of physical injury  7/13/2021 0801 by Chase Ramírez RN  Outcome: Ongoing     Problem: Activity:  Goal: Ability to tolerate increased activity will improve  Description: Ability to tolerate increased activity will improve  7/13/2021 0801 by Chase Ramírez RN  Outcome: Ongoing     Problem:  Activity:  Goal: Expression of feelings of increased energy will increase  Description: Expression of feelings of increased energy will increase  7/13/2021 0801 by Chase Ramírez RN  Outcome: Ongoing     Problem: Cardiac:  Goal: Ability to maintain an adequate cardiac output will improve  Description: Ability to maintain an adequate cardiac output will improve  7/13/2021 0801 by Chase Ramírez RN  Outcome: Ongoing     Problem: Cardiac:  Goal: Complications related to the disease process, condition or treatment will be avoided or minimized  Description: Complications related to the disease process, condition or treatment will be avoided or minimized  7/13/2021 0801 by Chase Ramírez RN  Outcome: Ongoing     Problem: Coping:  Goal: Level of anxiety will decrease  Description: Level of anxiety will decrease  7/13/2021 0801 by Chase Ramírez RN  Outcome: Ongoing     Problem: Coping:  Goal: General experience of comfort will improve  Description: General experience of comfort will improve  7/13/2021 0801 by Chase Ramírez RN  Outcome: Ongoing     Problem: Health Behavior:  Goal: Ability to manage health-related needs will improve  Description: Ability to manage health-related needs will improve  7/13/2021 0801 by Chase Ramírez RN  Outcome: Ongoing     Problem: Safety:  Goal: Ability to remain free from injury will improve  Description: Ability to remain free from injury will improve  7/13/2021 0801 by Yakelin Walker, RN  Outcome: Ongoing

## 2021-07-13 NOTE — PROCEDURES
[facility]     Electrophysiology Procedure Note       Date of Procedure: 7/13/2021  Patient's Name: Dwane Cogan  YOB: 1950   Medical Record Number: [id number]  Procedure Performed by: Erasmo Garcia MD    Procedures performed:    · Loop recorder implantation  · Anesthesia: Local anesthesia care  · Level of sedation plan: none  · Mallampati airway assessment class: I  · ASA class: 1    Indication of the procedure: Syncope, Palpitation   Dwane Cogan is a 66 y.o. female with palpitations (h/o atrial fibrillation s/p ablation by myself in the recent 1-2 years). Because the patient is at risk of having recurrent atrial tachydysrhythmia, even though previous 30-day event monitor was unrevealing of such, the decision was made to undergo a procedure that would afford long term rhythm detection. Therefore, the patient has been scheduled to undergo an ILR implantation. Details of procedure:   Procedure's risks, benefits and alternatives of procedure were explained to patient. The risks including, but not limited to, the risks of vascular injury, bleeding, infection, device malfunction, injury to cardiac and surrounding structures (including pneumothorax), stroke, myocardial infarction and death were discussed in detail. The patient was also counseled at length about the risks of willie Covid-19 in the zachary-operative and post-operative states including the recovery window of their procedure. The patient was made aware that willie Covid-19 after a surgical procedure may worsen their prognosis for recovering from the virus and lend to a higher morbidity and or mortality risk. The patient was given the option of postponing their procedure. The patient was also presented reasonable alternatives to the proposed care, treatment, and services.  The discussion I have had with the patient encompassed risks, benefits, and side effects related to the alternatives and the risks related to not [employer] Implantable Loop Recorder. Plan:   The patient will have usual post-implant care with a 1-week wound check with our nurse in the [facility] at [facility]. From an EP perspective, the patient may be discharged home today if the patient remains stable. Follow-up also includes routine device interrogation with the Device Clinic. Thank you for allowing us to participate in the care of your patient. If you have any questions or comments, please feel free to contact us.     Emily Rivera MD, MS, [address] [facility]  Cardiac Electrophysiology  [address]  [address] [facility], [address] [place] [address]  [facility] [address]  [phone]

## 2021-07-13 NOTE — PROGRESS NOTES
Discharge orders acknowledged by RN . Discharge teaching completed with pt and family. AVS reviewed and all questions answered. New prescriptions and current medication regimen reviewed and pt understands schedule. Follow up appointments also reviewed with pt and resources given for discharge. Pt was given written prescriptions to be filled and understands schedule. IV removed. Telemonitor removed and returned to [facility] All other education completed. Required core measures completed. Pt vitals WDL. Pt discharged with all belongings to home with . Pt transported off of unit via wheelchair. No complications. Instructions to remove pressure dressing in the AM and to remove bandage in one week.     Electronically signed by James Rapp RN on 7/13/2021 at 5:37 PM

## 2021-07-13 NOTE — DISCHARGE SUMMARY
Hospital Medicine Discharge Summary    Patient ID: Nohemy Means      Patient's PCP: Glendy Urbano DO    Admit Date: 7/12/2021     Discharge Date:      Admitting Physician: Starr Aguilar MD     Discharge Physician: Diamond Shaw MD     Discharge Diagnoses: Active Hospital Problems    Diagnosis Date Noted    Near syncope [R55]        The patient was seen and examined on day of discharge and this discharge summary is in conjunction with any daily progress note from day of discharge. Condition at discharge - stable    Hospital Course: patient seen and evaluated on the day of discharge. Patient informed about following up with appointments. Patient verbalized understanding for follow-up appointments. The patient and / or the family were informed of the results of tests, a time was given to answer questions, a plan was proposed and they agreed with plan. Medical reconciliation performed. Patient discharged stable condition. 1) Chest pain  - serial trop, ekg in am, Cardiology consult - NM stress test ordered - negative for filling defects     2) Afib/rvr vs svt  - tele      Exam:     /68   Pulse 92   Temp 97.8 °F (36.6 °C) (Oral)   Resp 16   Ht 5' 4\" (1.626 m)   Wt 123 lb 7.3 oz (56 kg)   SpO2 94%   BMI 21.19 kg/m²     General appearance: No apparent distress  HEENT:  Conjunctivae/corneas clear. Neck: Supple, No jugular venous distention. Respiratory:  Normal respiratory effort. Clear to auscultation, bilaterally without Rales/Wheezes/Rhonchi. Cardiovascular: Regular rate and rhythm with normal S1/S2 without murmurs, rubs or gallops. Abdomen: Soft, non-tender, non-distended, normal bowel sounds. Musculoskelatal: No clubbing, cyanosis or edema bilaterally. Skin: Skin color, texture, turgor normal.   Neurologic: no focal neurologic deficits.  grossly non-focal.  Psychiatric: Alert and oriented, normal mood    Consults:     IP CONSULT TO HOSPITALIST  IP CONSULT TO CARDIOLOGY      Code Status:  Full Code    Activity: activity as tolerated    Labs: For convenience and continuity at follow-up the following most recent labs are provided:      CBC:    Lab Results   Component Value Date    WBC 5.9 07/13/2021    HGB 11.8 07/13/2021    HCT 34.9 07/13/2021     07/13/2021       Renal:    Lab Results   Component Value Date     07/13/2021    K 3.9 07/13/2021     07/13/2021    CO2 24 07/13/2021    BUN 8 07/13/2021    CREATININE 0.7 07/13/2021    CALCIUM 8.7 07/13/2021    PHOS 1.9 07/12/2021       Discharge Medications:     Current Discharge Medication List           Details   metoprolol succinate (TOPROL XL) 25 MG extended release tablet Take 0.5 tablets by mouth daily as needed (palpitations)  Qty: 30 tablet, Refills: 3      famotidine (PEPCID) 20 MG tablet Take 20 mg by mouth 2 times daily      apixaban (ELIQUIS) 5 MG TABS tablet Take 1 tablet by mouth 2 times daily  Qty: 180 tablet, Refills: 3      Calcium-Magnesium-Vitamin D (CALCIUM 500 PO) Take 500 mg by mouth daily      calcitonin, salmon, (MIACALCIN) 200 UNIT/ACT nasal spray 1 spray by Nasal route daily. Time Spent on discharge is more than 30 mints in the examination, evaluation, counseling and review of medications and discharge plan. Signed:    Inocencio Mesa MD   7/13/2021      Thank you [id number] [facility], DO for the opportunity to be involved in this patient's care. If you have any questions or concerns please feel free to contact me at [phone].

## 2021-07-13 NOTE — ED NOTES
RN called report to ANNA via phone. RN updated patient on plan of care and she voices no concerns at this time.        Lis Dumont RN  07/12/21 2459

## 2021-07-13 NOTE — PLAN OF CARE
Problem: Falls - Risk of:  Goal: Will remain free from falls  Description: Will remain free from falls  Outcome: Ongoing  Goal: Absence of physical injury  Description: Absence of physical injury  Outcome: Ongoing     Problem:  Activity:  Goal: Ability to tolerate increased activity will improve  Description: Ability to tolerate increased activity will improve  Outcome: Ongoing  Goal: Expression of feelings of increased energy will increase  Description: Expression of feelings of increased energy will increase  Outcome: Ongoing     Problem: Cardiac:  Goal: Ability to maintain an adequate cardiac output will improve  Description: Ability to maintain an adequate cardiac output will improve  Outcome: Ongoing  Goal: Complications related to the disease process, condition or treatment will be avoided or minimized  Description: Complications related to the disease process, condition or treatment will be avoided or minimized  Outcome: Ongoing     Problem: Coping:  Goal: Level of anxiety will decrease  Description: Level of anxiety will decrease  Outcome: Ongoing  Goal: General experience of comfort will improve  Description: General experience of comfort will improve  Outcome: Ongoing     Problem: Health Behavior:  Goal: Ability to manage health-related needs will improve  Description: Ability to manage health-related needs will improve  Outcome: Ongoing     Problem: Safety:  Goal: Ability to remain free from injury will improve  Description: Ability to remain free from injury will improve  Outcome: Ongoing  Goal: Will show no signs and symptoms of excessive bleeding  Description: Will show no signs and symptoms of excessive bleeding  Outcome: Ongoing

## 2021-07-13 NOTE — CARE COORDINATION
INITIAL CASE MANAGEMENT ASSESSMENT     Reviewed chart, spoke with patient to assess possible discharge needs. Explained Case Management role/services. Patient lives in a house with her spouse. Patient is independent in ADLs and reports no concerns with mobility. Patient confirmed demographic information and PCP. Patient reported no discharge needs. ACP completed with patient. SW/CM will follow and assist as needed.     TASH Chen, [facility], Social Work  [phone]  Electronically signed by TASH Chen, STEFANIW on 7/13/2021 at 2:35 PM

## 2021-07-13 NOTE — H&P
H&P Update    I have reviewed the history and physical from Dr. Romeo Winkler  on 2021 and examined the patient and find no relevant changes. I have reviewed with the patient and/or family the risks, benefits, and alternatives to the procedure. Normal stress test (although patient complained of central chest dull ache) and palpitations, the latter for which we will implant ILR for long -term cardiac dysrhythmia monitoring. Pre-sedation Assessment    Patient:  Cici Moreno   :   1950  Intended Procedure: ILR implantation      Mane Olivas nurses notes reviewed and agreed.   Medications reviewed  Allergies: No Known Allergies      Pre-Procedure Assessment/Plan:  ASA 1 - Normal health patient    Level of Sedation Plan:No sedation    Post Procedure plan: Return to same level of care

## 2021-07-13 NOTE — H&P
Hospital Medicine History & Physical      PCP: Neha Arti     Date of Admission: 7/12/2021    Date of Service: Pt seen/examined on 7/12/2021 and  Placed in Observation. Chief Complaint:  Near syncope      History Of Present Illness:       69 y.o. female presents after wking from a nap around 2pm today, noting her pulse to be rapid with skipped beats, associated with shortness of breath, substernal chest pain which resolved following a second dose of her prn 12.5 mg metoprolol. She states she's required increased frequency usage of the prn metoprolol since first prescribed in the spring. Denies fever, chills, cough, abdominal pain, n/v/d. Past Medical History:          Diagnosis Date    Acid reflux     Atrial fibrillation [facility])        Past Surgical History:          Procedure Laterality Date    COLONOSCOPY  11/04/2014    ENDOSCOPY, COLON, DIAGNOSTIC  11/04/2014    with biopsy   [facility] Staff    Dr. Carolynn Becker Dr, [place]       Medications Prior to Admission:      Prior to Admission medications    Medication Sig Start Date End Date Taking? Authorizing Provider   metoprolol succinate (TOPROL XL) 25 MG extended release tablet Take 0.5 tablets by mouth daily as needed (palpitations) 4/28/21  Yes BRUNILDA Love - CNP   famotidine (PEPCID) 20 MG tablet Take 20 mg by mouth 2 times daily   Yes Historical Provider, MD   apixaban (ELIQUIS) 5 MG TABS tablet Take 1 tablet by mouth 2 times daily 2/19/20  Yes Emily Rivera MD   Calcium-Magnesium-Vitamin D (CALCIUM 500 PO) Take 500 mg by mouth daily   Yes Historical Provider, MD   calcitonin, salmon, (MIACALCIN) 200 UNIT/ACT nasal spray 1 spray by Nasal route daily. Yes Historical Provider, MD       Allergies:  Patient has no known allergies.     Social History:           TOBACCO:   reports that she has never smoked. She has never used smokeless tobacco.  ETOH:   reports no history of alcohol use. Family History:               Problem Relation Age of Onset    Other Mother     Other Father     Heart Disease Father     Heart Disease Brother        REVIEW OF SYSTEMS:   Pertinent positives as noted in the HPI. All other systems reviewed and negative. PHYSICAL EXAM PERFORMED:    /64   Pulse 82   Temp 97.9 °F (36.6 °C) (Oral)   Resp 20   Wt 123 lb (55.8 kg)   SpO2 100%   BMI 21.11 kg/m²     General appearance:  No apparent distress, appears stated age and cooperative. HEENT:  Normal cephalic, atraumatic without obvious deformity. Pupils equal, round,  Extra ocular muscles intact. Conjunctivae/corneas clear. Neck: Supple, with full range of motion. No jugular venous distention. Trachea midline. Respiratory:  Normal respiratory effort. Clear to auscultation, bilaterally without Rales/Wheezes/Rhonchi. Cardiovascular:  Regular rate and rhythm with normal S1/S2 without murmurs, rubs or gallops. Abdomen: Soft, non-tender, non-distended with normal bowel sounds. Musculoskeletal:  No clubbing, cyanosis or edema bilaterally. No calf tenderness  Skin: Skin color, texture, turgor normal.  No rashes or lesions. Neurologic:   t, grossly non-focal.  Psychiatric:  Alert and oriented, thought content appropriate, normal insight         Labs:     Recent Labs     07/12/21  1611   WBC 5.2   HGB 12.9   HCT 37.3        Recent Labs     07/12/21  1611      K 3.9      CO2 25   BUN 9   CREATININE 0.8   CALCIUM 9.1   PHOS 1.9*     Recent Labs     07/12/21  1611   AST 20   ALT 16   BILITOT <0.2   ALKPHOS 85     No results for input(s): INR in the last 72 hours.   Recent Labs     07/12/21  1611   TROPONINI <0.01       Urinalysis:      Lab Results   Component Value Date    NITRU Negative 07/12/2021    WBCUA 1 07/12/2021    RBCUA 0 07/12/2021    BLOODU Negative 07/12/2021    SPECGRAV 1.005 07/12/2021

## 2021-07-15 PROBLEM — Z45.09 ENCOUNTER FOR ELECTRONIC ANALYSIS OF REVEAL EVENT RECORDER: Chronic | Status: ACTIVE | Noted: 2021-07-13

## 2021-07-16 ENCOUNTER — TELEPHONE (OUTPATIENT)
Dept: CARDIOLOGY CLINIC | Age: 71
End: 2021-07-16

## 2021-07-16 ENCOUNTER — NURSE ONLY (OUTPATIENT)
Dept: CARDIOLOGY CLINIC | Age: 71
End: 2021-07-16

## 2021-07-16 NOTE — TELEPHONE ENCOUNTER
Patient call back stated her symptoms have completely resolved and she send in a remote device transmission.

## 2021-07-16 NOTE — RESULT ENCOUNTER NOTE
Device interrogation reviewed. Shows both paroxysmal atrial fibrillation and paroxysmal left atrial flutter. Please ask patient to return to clinic to discuss management of such, including ablation for both.

## 2021-07-16 NOTE — PROGRESS NOTES
ILR for palpitations/suspected AF (h/o atrial fibrillation s/p ablation by Dr. Jennifer Hamm in the recent 1-2 years).        Remote transmission received. Since 07.14.2021:  4 Symptom  6 Tachy  2 AF, 0.7% burden     All episodes, excluding 2 symptom events recorded 07.14) occurred today 07.16.21 from 09:09am-09:53am. Today's ECG recordings illustrate what appear to be PAF w RVR as well as runs of SVT, rates up to 240bpm. Intermittent undersensing noted.     Symptom events on 07.14.21 illustrate NSR as well as what appears to be PAF w RVR.     Pt on Toprol XL and Eliquis.     EP physician to review. We will continue to monitor remotely.

## 2021-07-16 NOTE — TELEPHONE ENCOUNTER
I called and spoke with patient she states that her chest pain has resolved. She reported that when she was having the CP it was in the center of her chest described as a pressure did not radiate and not associated symptoms of SOB or N/V . The CP occurred while sitting in passenger side seat. While I was speaking to her on the phone she started to feel lightheaded and feel like he was going to pass out I told he to pressure her loop recorded to kassy the event and when she arrives home to send a remote transmission and we will have the doctor review.

## 2021-07-16 NOTE — TELEPHONE ENCOUNTER
ILR for palpitations/suspected AF (h/o atrial fibrillation s/p ablation by Dr. Marlene López in the recent 1-2 years). Remote transmission received. Since 07.14.2021:  4 Symptom  6 Tachy  2 AF, 0.7% burden    All episodes, excluding 2 symptom events recorded 07.14) occurred today 07.16.21 from 09:09am-09:53am. Today's ECG recordings illustrate what appear to be PAF w RVR as well as runs of SVT, rates up to 240bpm. Intermittent undersensing noted. Symptom events on 07.14.21 illustrate NSR as well as what appears to be PAF w RVR. Pt on Toprol XL and Eliquis. EP physician to review. We will continue to monitor remotely.

## 2021-07-16 NOTE — TELEPHONE ENCOUNTER
Patient called in stating she took her metoprolol 12.5 mg 15 minutes ago at 10 am. She is having palpitations, chest pain, feeling faint. She does not have a way to take her HR. (She is in the car and will not be home anytime soon)    She is wondering if she could take an additional metoprolol now since her symptoms have not improved. I spoke with Bryn Roque and explained to patient that metoprolol will take 30-45 min to start working and to give it more time. Please advise with additional recommendations.     Please call patient at [phone]

## 2021-07-20 ENCOUNTER — NURSE ONLY (OUTPATIENT)
Dept: CARDIOLOGY CLINIC | Age: 71
End: 2021-07-20
Payer: MEDICARE

## 2021-07-20 DIAGNOSIS — I48.0 PAROXYSMAL ATRIAL FIBRILLATION (HCC): ICD-10-CM

## 2021-07-20 DIAGNOSIS — I48.91 NEW ONSET ATRIAL FIBRILLATION (HCC): ICD-10-CM

## 2021-07-20 DIAGNOSIS — Z86.79 S/P ABLATION OF ATRIAL FIBRILLATION: ICD-10-CM

## 2021-07-20 DIAGNOSIS — R42 DIZZINESS: ICD-10-CM

## 2021-07-20 DIAGNOSIS — Z98.890 S/P ABLATION OF ATRIAL FIBRILLATION: ICD-10-CM

## 2021-07-20 DIAGNOSIS — R55 NEAR SYNCOPE: ICD-10-CM

## 2021-07-20 DIAGNOSIS — Z45.09 ENCOUNTER FOR ELECTRONIC ANALYSIS OF REVEAL EVENT RECORDER: Primary | ICD-10-CM

## 2021-07-20 NOTE — RESULT ENCOUNTER NOTE
Device interrogation reviewed. Shows episode of \"afib\" x 14 minutes, but upon inspection of the event, it was sinus with PACs. Continue to monitor.

## 2021-07-20 NOTE — PROGRESS NOTES
Pt seen in clinic today for cardiac device interrogation and site check 1 week post implant. Site appears to be healing well, minimal bruising, no swelling, consistent temp with surrounding area. Their device is a MDT ILR   the device appears to be functioning normally. Remaining battery life is \"OK\"    last remote check 7/16/2021  1 episode Afib 14min duration 7/19/2021 - pt on AC    Pt was informed of findings today and general questions have been answered with regard to device. Home monitoring hardware is transmitting.     Results discussed with or to be reviewed by EP

## 2021-07-21 ENCOUNTER — NURSE ONLY (OUTPATIENT)
Dept: CARDIOLOGY CLINIC | Age: 71
End: 2021-07-21

## 2021-07-21 PROCEDURE — 93291 INTERROG DEV EVAL SCRMS IP: CPT | Performed by: INTERNAL MEDICINE

## 2021-07-21 NOTE — RESULT ENCOUNTER NOTE
Device interrogation reviewed. The patient should already be scheduled to see me in clinic to discuss need for re-ablation for atrial fibrillation/atrial flutter.

## 2021-07-21 NOTE — PROGRESS NOTES
ILR for palpitations/suspected AF (h/o atrial fibrillation s/p ablation by Dr. Gennaro Ng the recent 1-2 years).        [employer] alert transmission received 07.21.21 for Symptom, Tachy, AF. Since 07.20.2021:  5 Symptom  5 Tachy  1 AF, 11.8% burden    ECG available on report shows Symptom and Tachy episode on 07.20.21 illustrating what appears to be AF/AFL w RVR, max V rate 194bpm.     Pt on Toprol XL and Eliquis.     EP physician to review. We will continue to monitor remotely.

## 2021-08-09 ENCOUNTER — OFFICE VISIT (OUTPATIENT)
Dept: CARDIOLOGY CLINIC | Age: 71
End: 2021-08-09
Payer: MEDICARE

## 2021-08-09 ENCOUNTER — NURSE ONLY (OUTPATIENT)
Dept: CARDIOLOGY CLINIC | Age: 71
End: 2021-08-09

## 2021-08-09 VITALS
DIASTOLIC BLOOD PRESSURE: 68 MMHG | OXYGEN SATURATION: 99 % | SYSTOLIC BLOOD PRESSURE: 118 MMHG | HEART RATE: 79 BPM | HEIGHT: 64 IN | WEIGHT: 118 LBS | BODY MASS INDEX: 20.14 KG/M2

## 2021-08-09 DIAGNOSIS — I10 ESSENTIAL HYPERTENSION: ICD-10-CM

## 2021-08-09 DIAGNOSIS — R42 DIZZINESS: ICD-10-CM

## 2021-08-09 DIAGNOSIS — R55 NEAR SYNCOPE: ICD-10-CM

## 2021-08-09 DIAGNOSIS — I48.0 PAROXYSMAL ATRIAL FIBRILLATION (HCC): Primary | ICD-10-CM

## 2021-08-09 DIAGNOSIS — Z98.890 S/P ABLATION OF ATRIAL FIBRILLATION: ICD-10-CM

## 2021-08-09 DIAGNOSIS — Z45.09 ENCOUNTER FOR ELECTRONIC ANALYSIS OF REVEAL EVENT RECORDER: ICD-10-CM

## 2021-08-09 DIAGNOSIS — I48.92 LEFT ATRIAL FLUTTER BY ELECTROCARDIOGRAM (HCC): ICD-10-CM

## 2021-08-09 DIAGNOSIS — Z86.79 S/P ABLATION OF ATRIAL FIBRILLATION: ICD-10-CM

## 2021-08-09 DIAGNOSIS — I48.0 PAROXYSMAL ATRIAL FIBRILLATION (HCC): ICD-10-CM

## 2021-08-09 DIAGNOSIS — Z79.01 CURRENT USE OF ANTICOAGULANT THERAPY: ICD-10-CM

## 2021-08-09 PROCEDURE — 1090F PRES/ABSN URINE INCON ASSESS: CPT | Performed by: INTERNAL MEDICINE

## 2021-08-09 PROCEDURE — 99215 OFFICE O/P EST HI 40 MIN: CPT | Performed by: INTERNAL MEDICINE

## 2021-08-09 PROCEDURE — G8427 DOCREV CUR MEDS BY ELIG CLIN: HCPCS | Performed by: INTERNAL MEDICINE

## 2021-08-09 PROCEDURE — G8420 CALC BMI NORM PARAMETERS: HCPCS | Performed by: INTERNAL MEDICINE

## 2021-08-09 PROCEDURE — 4040F PNEUMOC VAC/ADMIN/RCVD: CPT | Performed by: INTERNAL MEDICINE

## 2021-08-09 PROCEDURE — 1123F ACP DISCUSS/DSCN MKR DOCD: CPT | Performed by: INTERNAL MEDICINE

## 2021-08-09 PROCEDURE — 1036F TOBACCO NON-USER: CPT | Performed by: INTERNAL MEDICINE

## 2021-08-09 PROCEDURE — G8399 PT W/DXA RESULTS DOCUMENT: HCPCS | Performed by: INTERNAL MEDICINE

## 2021-08-09 PROCEDURE — 3017F COLORECTAL CA SCREEN DOC REV: CPT | Performed by: INTERNAL MEDICINE

## 2021-08-09 NOTE — PROGRESS NOTES
Cardiac Electrophysiology Consultation   Date: 8/9/2021  Reason for Consultation: Atrial fibrillation  Consult Requesting Physician: Korina Buckner DO  Primary Care Physician: Korina Buckner DO    Chief Complaint:   Chief Complaint   Patient presents with   Ratna Arora     when she has her episodes she will have chest pain. HPI: Stevo Holloway is a 66 y.o. patient with a history of  syncope, hypotension, pAFIB-new onset 12/16/2016 0400. Converted per cardizem gtt. At that time, she had diarrhea, near syncope, fell. \"woozy and weak feeling. \" CT revealed extensive colitis at that time. She was seen in office on 4/1/2019 to establish care for her atrial fibrillation and medication therapy vs ablation was discussed and she decided to use medication therapy with Flecainide. She presented to office on 2/19/2020 accompanied by her   for follow up for her atrial fibrillation. She reported her blood pressure had been running low reports SBP occasionally in 70's and 80's, most time running high . She reports that she is symptomatic when her blood pressure is low she will feel lightheaded \"woozy\". Ablation was again dicussed as a treatment option for her atrial fibrillation and she decided to deliberate further before making her decision to proceed. She called the office on 2/24/2020 reporting the she was ready to proceed with scheduling. On 8/11/2020 she underwent radiofrequency ablation of atrial fibrillation. She was seen in office on 11/12/2020 by Gisell Rollins CNP and EKG showed SR. Flecainide and Toprol XL was stopped and 30 day monitor was placed in order to assess new baseline cardiac rhythm apart from any anti-arrhythmic medications. The monitor was worn from 11/13/2020-12/12/2020 and showed SR with some PVC monomorphic. She underwent implantation of [employer] loop recorder on 7/13/2021. Device interrogation on 7/22/2021 showed return of atrial fibrillation.      Interval History: Today, she present to office accompanied by her  to discuss treatment options for her afib. She reports that she had some issues with pain in her leg s/p ablation. EKG today shows SR. Patient denies exertional chest pain/pressure, dyspnea at rest, CORONA, PND, orthopnea, palpitations, lightheadedness, weight changes, changes in LE edema, and syncope. Past Medical History:   Diagnosis Date    Acid reflux     Atrial fibrillation [facility])         Past Surgical History:   Procedure Laterality Date    COLONOSCOPY  11/04/2014    ENDOSCOPY, COLON, DIAGNOSTIC  11/04/2014    with biopsy   Debbie Sánchez Dr, Lexi Saldivar       Allergies:  No Known Allergies    Medication:   Prior to Admission medications    Medication Sig Start Date End Date Taking? Authorizing Provider   metoprolol succinate (TOPROL XL) 25 MG extended release tablet Take 0.5 tablets by mouth daily as needed (palpitations) 4/28/21  Yes BRUNILDA Love CNP   famotidine (PEPCID) 20 MG tablet Take 20 mg by mouth 2 times daily   Yes Historical Provider, MD   apixaban (ELIQUIS) 5 MG TABS tablet Take 1 tablet by mouth 2 times daily 2/19/20  Yes Betsy Grimes MD   Calcium-Magnesium-Vitamin D (CALCIUM 500 PO) Take 500 mg by mouth daily   Yes Historical Provider, MD   calcitonin, salmon, (MIACALCIN) 200 UNIT/ACT nasal spray 1 spray by Nasal route daily. Yes Historical Provider, MD       Social History:   reports that she has never smoked. She has never used smokeless tobacco. She reports that she does not drink alcohol and does not use drugs. Family History:  family history includes Heart Disease in her brother and father; Other in her father and mother. Reviewed.  Denies family history of sudden cardiac death, arrhythmia, premature CAD    Review of System:    · General ROS: negative for - chills, fever   · Psychological ROS: negative for - anxiety or depression  · Ophthalmic ROS: negative for - eye pain or loss of vision  · ENT ROS: negative for - epistaxis, headaches, nasal discharge, sore throat   · Allergy and Immunology ROS: negative for - hives, nasal congestion   · Hematological and Lymphatic ROS: negative for - bleeding problems, blood clots, bruising or jaundice  · Endocrine ROS: negative for - skin changes, temperature intolerance or unexpected weight changes  · Respiratory ROS: negative for - cough, hemoptysis, pleuritic pain, SOB, sputum changes or wheezing  · Cardiovascular ROS: Per HPI. · Gastrointestinal ROS: negative for - abdominal pain, blood in stools, diarrhea, hematemesis, melena, nausea/vomiting or swallowing difficulty/pain  · Genito-Urinary ROS: negative for - dysuria or incontinence  · Musculoskeletal ROS: negative for - joint swelling or muscle pain  · Neurological ROS: negative for - confusion, dizziness, gait disturbance, headaches, numbness/tingling, seizures, speech problems, tremors, visual changes or weakness  · Dermatological ROS: negative for - rash    Physical Examination:  Vitals:    08/09/21 1617   BP: 118/68   Pulse: 79   SpO2: 99%       · Constitutional: Oriented. No distress. · Head: Normocephalic and atraumatic. · Mouth/Throat: Oropharynx is clear and moist.   · Eyes: Conjunctivae normal. EOM are normal.   · Neck: Normal range of motion. Neck supple. No rigidity. No JVD present. · Cardiovascular: Normal rate, regular rhythm, S1&S2 and intact distal pulses. · Pulmonary/Chest: Bilateral respiratory sounds. No wheezes. No rhonchi. · Abdominal: Soft. Bowel sounds present. No distension, No tenderness. · Musculoskeletal: No tenderness. No edema    · Lymphadenopathy: Has no cervical adenopathy. · Neurological: Alert and oriented. Cranial nerve appears intact, No Gross deficit   · Skin: Skin is warm and dry. No rash noted.    · Psychiatric: Has a normal mood, affect and behavior     Labs:  Reviewed. EC2021 reviewed, sinus rhythm with v-rate of 78 bpm with QRS duration 84 ms. No pathologic Q waves, ventricular pre-excitation, or QT prolongation. Studies:   1. Event Monitor 2020-2020    SR with some PVC monomorphic. Event monitor: 1/10/17-2/10/17  Baseline SR   AF with RVR, symptoms correlate with RVR     2. Echo: 16  Summary   Left ventricle size is normal.   Normal left ventricular wall thickness.   Ejection fraction is visually estimated to be 60%.   Normal right ventricular size and function.   No significant valvular disease. 3. Stress Test:  2017   Summary    Normal myocardial perfusion study.    Normal LV size and systolic function.  ECG portion of stress test is normal.    Alofrd score equals 4.    Overall findings represent a low risk scan.         4. Cath: none     I independently reviewed the ECG, MCOT, echocardiogram, stress test, and coronary angiography/PCI results and used them for my plan of care. Procedures:  1. Electrophysiology study with radiofrequency ablation of atrial fibrillation 2020.  2. Loop recorder implantation  2021. Assessment/Plan:     Atrial fibrillation, paroxsymal   Left atrial flutter   S/p ablation of atrial fibrillation on 2020. She has a [id number] Score 3 (HTN, AGE, female, female gender)  On Eliquis 5 mg BID for  thromboembolic risk reduction. Tolerating well no signs or symptoms of abnormal bruising or bleeding. Device interrogation on 2021 showed a return of atrial fibrillation and atrial flutter. We educated the patient that left atrial flutter is a worsening and progressive disease, with more frequent episodes that will ensue. Subsequent episodes usually become more sustained to the extent that many individuals would then develop persistent left atrial flutter.  Once persistence is reached, permanent left atrial flutter is inevitable. We also discussed the fact that left atrial flutter is associated with stroke, including life-threatening stroke, and therefore oral anticoagulation is warranted depending on the patient's [id number] score. We discussed different management options for left atrial flutter including their risks and benefits. These options include use of cardioversion which provides an effective immediate therapy with success rates of 90% or higher, but it provides no short nor long term efficacy. Anti-arrhythmic medications has been very difficult to control left atrial flutter, both in regards to heart rate and rhythm control even with a powerful anti-arrhythmic medication (amiodarone). Left atrial flutter ablation is a potentially curative therapy with very reasonable success rate. The risks, benefits and alternatives of the left atrial flutter ablation procedure were discussed with the patient. The risks including, but not limited to, bleeding, infection, radiation exposure, injury to vascular, cardiac and surrounding structures (including pneumothorax), stroke, cardiac perforation, tamponade, need for emergent open heart surgery, need for pacemaker implantation, injury to the phrenic nerve, injury to the esophagus, myocardial infarction and death were discussed in detail. The patient was also counseled at length about the risks of willie Covid-19 in the zachary-operative and post-operative states including the recovery window of their procedure. The patient was made aware that willie Covid-19 after a surgical procedure may worsen their prognosis for recovering from the virus and lend to a higher morbidity and or mortality risk. The patient was given the option of postponing their procedure. The patient was also presented reasonable alternatives to the proposed care, treatment, and services.  The discussion I have had with the patient encompassed risks, benefits, and side effects related to the alternatives and the risks related to not receiving the proposed care, treatment and services. I spent 40 minutes face to face with the patient, with greater than 50% of that time spent in counseling on the above. The patient opted to proceed with the left atrial flutter ablation. We will schedule for a radiofrequency ablation with [employer] system with a GIANNA procedure immediately prior to this ablation. We will hold Eliquisfor 12 hours prior to procedure. We will order BMP, CBC, PT/INR, and Type & Screen prior to the procedure. Hypertension, essential   Stable    Follow up three months after the procedure with Debbi Wong, CNP. Thank you for allowing me to participate in the care of Priyanka Will. All questions and concerns were addressed to the patient/family. Alternatives to my treatment were discussed. This note was scribed in the presence of Dr. Tawny Olivas MD by Leilani Carrillo RN. The scribe's documentation has been prepared under my direction and personally reviewed by me in its entirety. I confirm that the note above accurately reflects all work, physical examination, the discussion of treatments and procedures, and medical decision making performed by me. Tawny Olivas MD, MS, [facility], [place] [address] Electrophysiology  [address], [address] [place] [address]  Collin Hays [facility] [address]  [phone]:  Reviewed.

## 2021-08-09 NOTE — PATIENT INSTRUCTIONS
If you have any question regarding your atrial fibrillation ablation or would like to proceed with scheduling please contact Dr. rEicka Toure Nurse Yecenia Dahl at [phone]. Atrial Fibrillation Ablation Pre procedure Instructions    Date: 9-    Arrive at: 6:30 am    Procedure time: 7:30 am    The morning of your procedure you will park in the hospital parking lot and report directly to the cath lab to check in. At the information desk stay right and go all the way to the end of the mcconnell, this will take you directly to your check in desk for the cath lab. Pre-Procedure Instructions   1. You will need to fast (nothing to eat or drink) for at least 8 hours prior to procedure. 2. You will need to hold your Eliquis for 12 hours prior to the procedure. 3. Do not use any lotions, creams or perfume the morning of procedure. 4. You will need to complete pre-procedure lab work 5-7 days prior to your procedure. 5. Bring documentation of your COVID  vaccination with you the day of your procedure and you will NOT be required to get a COVID test.  6. Please have a responsible adult to drive you home after procedure, you should go home same day, but there is always a possibility of an overnight stay. 7. Cath lab will provide you with all post procedure instructions  8. A 3 month follow up will be scheduled with Dr. Ericka Toure Nurse practitioner Leah Mahmood CNP post procedure      ________________________________________________________________________________________________    Patient Education        Learning About Atrial Flutter  What is atrial flutter? Atrial flutter is a type of heartbeat problem (arrhythmia) that usually causes a fast heart rate. This fast rate is caused by changes in the electrical system of your heart. Normally, the heart beats in a strong, steady rhythm.  In atrial flutter, a problem with the heart's electrical system causes the two upper parts of the heart (the right atrium and the left back to a normal rhythm. · Catheter ablation to stop atrial flutter. Thin wires are used to send energy to destroy the tiny areas of heart tissue that are causing atrial flutter. How can you live well with it? You can live well and help manage atrial flutter by having a heart-healthy lifestyle. To have a heart-healthy lifestyle:  · Don't smoke. · Eat heart-healthy foods. · Be active. Talk to your doctor about what type and level of exercise is safe for you. · Stay at a healthy weight. Lose weight if you need to. · Manage stress. · Avoid alcohol if it triggers symptoms. · Manage other health problems such as high blood pressure, high cholesterol, and diabetes. · Avoid getting sick from the flu. Get a flu shot every year. When should you call for help? Call 911 anytime you think you may need emergency care. For example, call if:  · You have symptoms of a stroke. These may include:  ? Sudden numbness, tingling, weakness, or loss of movement in your face, arm, or leg, especially on only one side of your body. ? Sudden vision changes. ? Sudden trouble speaking. ? Sudden confusion or trouble understanding simple statements. ? Sudden problems with walking or balance. ? A sudden, severe headache that is different from past headaches. Call your doctor now or seek immediate medical care if:  · You have new or increased shortness of breath. · You feel dizzy or lightheaded, or you feel like you may faint. Watch closely for changes in your health, and be sure to contact your doctor if you have any problems. Follow-up care is a key part of your treatment and safety. Be sure to make and go to all appointments, and call your doctor if you are having problems. It's also a good idea to know your test results and keep a list of the medicines you take. Where can you learn more? Go to https://wyatt.Enjoi. org and sign in to your qualifyor account.  Enter P747 in the [employer] box to learn more about \"Learning About Atrial Flutter. \"     If you do not have an account, please click on the \"Sign Up Now\" link. Current as of: August 31, 2020               Content Version: 12.9  © 2006-2021 [email or web address]. Care instructions adapted under license by [facility] ([facility]). If you have questions about a medical condition or this instruction, always ask your healthcare professional. [address] any warranty or liability for your use of this information. Patient Education        Atrial Fibrillation: Care Instructions  Your Care Instructions     Atrial fibrillation is an irregular and often fast heartbeat. Treating this condition is important for several reasons. It can cause blood clots, which can travel from your heart to your brain and cause a stroke. If you have a fast heartbeat, you may feel lightheaded, dizzy, and weak. An irregular heartbeat can also increase your risk for heart failure. Atrial fibrillation is often the result of another heart condition, such as high blood pressure or coronary artery disease. Making changes to improve your heart condition will help you stay healthy and active. Follow-up care is a key part of your treatment and safety. Be sure to make and go to all appointments, and call your doctor if you are having problems. It's also a good idea to know your test results and keep a list of the medicines you take. How can you care for yourself at home? Medicines    · Take your medicines exactly as prescribed. Call your doctor if you think you are having a problem with your medicine. You will get more details on the specific medicines your doctor prescribes.     · If your doctor has given you a blood thinner to prevent a stroke, be sure you get instructions about how to take your medicine safely.  Blood thinners can cause serious bleeding problems.     · Do not take any vitamins, over-the-counter drugs, or herbal products without talking to your doctor first.   Lifestyle changes    · Do not smoke. Smoking can increase your chance of a stroke and heart attack. If you need help quitting, talk to your doctor about stop-smoking programs and medicines. These can increase your chances of quitting for good.     · Eat a heart-healthy diet.     · Stay at a healthy weight. Lose weight if you need to.     · Limit alcohol to 2 drinks a day for men and 1 drink a day for women. Too much alcohol can cause health problems.     · Avoid colds and flu. Get a pneumococcal vaccine shot. If you have had one before, ask your doctor whether you need another dose. Get a flu shot every year. If you must be around people with colds or flu, wash your hands often. Activity    · If your doctor recommends it, get more exercise. Walking is a good choice. Bit by bit, increase the amount you walk every day. Try for at least 30 minutes on most days of the week. You also may want to swim, bike, or do other activities. Your doctor may suggest that you join a cardiac rehabilitation program so that you can have help increasing your physical activity safely.     · Start light exercise if your doctor says it is okay. Even a small amount will help you get stronger, have more energy, and manage stress. Walking is an easy way to get exercise. Start out by walking a little more than you did in the hospital. Gradually increase the amount you walk.     · When you exercise, watch for signs that your heart is working too hard. You are pushing too hard if you cannot talk while you are exercising. If you become short of breath or dizzy or have chest pain, sit down and rest immediately.     · Check your pulse regularly. Place two fingers on the artery at the palm side of your wrist, in line with your thumb. If your heartbeat seems uneven or fast, talk to your doctor. When should you call for help? Call 911 anytime you think you may need emergency care.  For example, call if:    · You have symptoms of a heart attack. These may include:  ? Chest pain or pressure, or a strange feeling in the chest.  ? Sweating. ? Shortness of breath. ? Nausea or vomiting. ? Pain, pressure, or a strange feeling in the back, neck, jaw, or upper belly or in one or both shoulders or arms. ? Lightheadedness or sudden weakness. ? A fast or irregular heartbeat. After you call 911, the  may tell you to chew 1 adult-strength or 2 to 4 low-dose aspirin. Wait for an ambulance. Do not try to drive yourself.     · You have symptoms of a stroke. These may include:  ? Sudden numbness, tingling, weakness, or loss of movement in your face, arm, or leg, especially on only one side of your body. ? Sudden vision changes. ? Sudden trouble speaking. ? Sudden confusion or trouble understanding simple statements. ? Sudden problems with walking or balance. ? A sudden, severe headache that is different from past headaches.     · You passed out (lost consciousness). Call your doctor now or seek immediate medical care if:    · You have new or increased shortness of breath.     · You feel dizzy or lightheaded, or you feel like you may faint.     · Your heart rate becomes irregular.     · You can feel your heart flutter in your chest or skip heartbeats. Tell your doctor if these symptoms are new or worse. Watch closely for changes in your health, and be sure to contact your doctor if you have any problems. Where can you learn more? Go to https://Klixbox Media (T/A).American Efficient. org and sign in to your Namo Media account. Enter U020 in the [facility] box to learn more about \"Atrial Fibrillation: Care Instructions. \"     If you do not have an account, please click on the \"Sign Up Now\" link. Current as of: August 31, 2020               Content Version: 12.9  © 4410-3446 Healthwise, Student[employer]under. Care instructions adapted under license by [facility] ([facility]).  If you have questions about a medical condition or this instruction, always ask your healthcare professional. [address] any warranty or liability for your use of this information. Patient Education        Learning About Catheter Ablation for Heart Rhythm Problems  What is catheter ablation? Catheter ablation is a procedure that treats heart rhythm problems. These problems include atrial fibrillation, supraventricular tachycardia (SVT), atrial flutter, and ventricular tachycardia. Your heart should have a strong, steady beat. That beat is controlled by the heart's electrical system. Sometimes that system misfires. This causes a heartbeat that is too fast and isn't steady. Catheter ablation is a way to get into your heart and fix the problem. Ablation is not surgery. How is catheter ablation done? Your doctor inserts thin tubes called catheters into a blood vessel in your groin, arm, or neck. Then your doctor feeds them into the heart. Wires in the catheters help the doctor find the problem areas. Then the doctor uses the wires to send energy to destroy the tiny areas of heart tissue that are causing the problems. It may seem like a bad idea to destroy parts of your heart on purpose. But the areas that are destroyed are very tiny. They should not affect your heart's ability to do its job. You may be awake during the procedure. Or you may be asleep. The doctor will give you medicines to help you feel relaxed and to numb the areas where the catheters go in. You may feel a little uncomfortable, but you should not feel pain. What can you expect after catheter ablation? You may stay overnight in the hospital. How long you stay in the hospital depends on the type of ablation you have. Do not exercise hard or lift anything heavy for a week. You will probably be able to go back to work and to your normal routine in 1 or 2 days. You may have swelling, bruising, or a small lump around the site where the catheters went into your body.  These should go away in 3 to 4 weeks. You may have to take some medicines for a while. Follow-up care is a key part of your treatment and safety. Be sure to make and go to all appointments, and call your doctor if you are having problems. It's also a good idea to know your test results and keep a list of the medicines you take. Where can you learn more? Go to https://chpepiceweb.Rain. org and sign in to your [employer] account. Enter N974 in the [employer] box to learn more about \"Learning About Catheter Ablation for Heart Rhythm Problems. \"     If you do not have an account, please click on the \"Sign Up Now\" link. Current as of: August 31, 2020               Content Version: 12.9  © 2006-2021 Healthwise, [employer]. Care instructions adapted under license by [facility] ([facility]). If you have questions about a medical condition or this instruction, always ask your healthcare professional. [place] [address] any warranty or liability for your use of this information. Patient Education        Electrophysiology Study and Catheter Ablation: Before Your Procedure  What is an electrophysiology study and catheter ablation? An electrophysiology study is a test to see if there is a problem with your heart rhythm and to find out how to fix it. It is also called an EP study. A catheter ablation procedure is sometimes done at the same time. This procedure destroys (ablates) small areas of your heart that are causing your heart rhythm problem. The doctor puts plastic tubes called catheters into blood vessels in your groin, arm, or neck. He or she then uses an X-ray machine to guide long wires through the tubes to your heart. The doctor can use these wires to record your heart's electrical signals. If the doctor thinks your problem can be fixed with ablation, he or she can use the wires to destroy a small part of your heart tissue. This is most often done with radio waves.   You will probably be awake during the procedure. But you might be asleep. The doctor will give you medicines to help you feel relaxed and to numb the areas where the catheters go in. An EP study and ablation can take 2 to 6 hours. In rare cases, it can take longer. If you have an EP study only and you don't need more treatment, you may go home the same day. But if you also have ablation, you may stay overnight in the hospital. How long you stay in the hospital depends on the type of ablation you have. Do not exercise hard or lift anything heavy for a week. You may be able to go back to work and to your normal routine in 1 or 2 days. Follow-up care is a key part of your treatment and safety. Be sure to make and go to all appointments, and call your doctor if you are having problems. It's also a good idea to know your test results and keep a list of the medicines you take. How do you prepare for the procedure? Procedures can be stressful. This information will help you understand what you can expect. And it will help you safely prepare for your procedure. Preparing for the procedure    · Be sure you have someone to take you home. Anesthesia and pain medicine will make it unsafe for you to drive or get home on your own.     · Understand exactly what procedure is planned, along with the risks, benefits, and other options.     · Tell your doctor ALL the medicines, vitamins, supplements, and herbal remedies you take. Some may increase the risk of problems during your procedure. Your doctor will tell you if you should stop taking any of them before the procedure and how soon to do it.     · If you take aspirin or some other blood thinner, ask your doctor if you should stop taking it before your procedure. Make sure that you understand exactly what your doctor wants you to do. These medicines increase the risk of bleeding.     · Make sure your doctor and the hospital have a copy of your advance directive.  If you don't have one, you may want to prepare one. It lets others know your health care wishes. It's a good thing to have before any type of surgery or procedure. What happens on the day of the procedure? · Follow the instructions exactly about when to stop eating and drinking. If you don't, your procedure may be canceled. If your doctor told you to take your medicines on the day of the procedure, take them with only a sip of water.     · Take a bath or shower before you come in for your procedure. Do not apply lotions, perfumes, deodorants, or nail polish.     · Take off all jewelry and piercings. And take out contact lenses, if you wear them. At the hospital or surgery center   · Bring a picture ID.     · You will be kept comfortable and safe by your anesthesia provider. The anesthesia may make you sleep. Or it may just numb the area being worked on.     · This procedure can take 2 to 6 hours. In rare cases, it can take longer.     · After the procedure, pressure will be applied to the area where the catheter was put in your blood vessel. Then the area may be covered with a bandage or a compression device. This will prevent bleeding.     · Nurses will check your heart rate and blood pressure. The nurse will also check the catheter site for bleeding.     · If the catheter was put in your groin, you will need to lie still and keep your leg straight for several hours. The nurse may put a weighted bag on your leg to keep it still.     · If the catheter was put in your arm, you may be able to sit up and get out of bed right away. But you will need to keep your arm still for at least 1 hour.     · You may have a bruise or a small lump where the catheter was put in your blood vessel. This is normal and will go away. When should you call your doctor?    · You have questions or concerns.     · You don't understand how to prepare for your procedure.     · You become ill before the procedure (such as fever, flu, or a cold).     · You need to reschedule or have changed your mind about having the procedure. Where can you learn more? Go to https://chpepiceweb.Peeppl Media. org and sign in to your RADLIVE account. Enter A059 in the [facility] box to learn more about \"Electrophysiology Study and Catheter Ablation: Before Your Procedure. \"     If you do not have an account, please click on the \"Sign Up Now\" link. Current as of: August 31, 2020               Content Version: 12.9  © 2006-2021 JumpSeller. Care instructions adapted under license by [facility] ([facility]). If you have questions about a medical condition or this instruction, always ask your healthcare professional. [address] any warranty or liability for your use of this information. Patient Education        Electrophysiology Study and Catheter Ablation: What to Expect at [address] [place] [address] Drive had an electrophysiology study for a problem with your heartbeat. You may also have had a catheter ablation to try to correct the problem. You may have swelling, bruising, or a small lump around the site where the catheters went into your body. These should go away in 3 to 4 weeks. Do not exercise hard or lift anything heavy for a week. You may be able to go back to work and to your normal routine in 1 or 2 days. This care sheet gives you a general idea about how long it will take for you to recover. But each person recovers at a different pace. Follow the steps below to get better as quickly as possible. How can you care for yourself at home? Activity    · For 1 week, do not lift anything that would make you strain.  This may include heavy grocery bags and milk containers, a heavy briefcase or backpack, cat litter or dog food bags, a vacuum , or a child.     · For 1 week, do not exercise hard or do any activity that could strain your blood vessels or the site where the catheters went into your body.     · Ask your doctor when it is okay to have sex.   Diet    · You can eat your normal diet. If your stomach is upset, try bland, low-fat foods like plain rice, broiled chicken, toast, and yogurt.     · Drink plenty of fluids (unless your doctor tells you not to). Medicines    · Your doctor will tell you if and when you can restart your medicines. He or she will also give you instructions about taking any new medicines.     · If you take aspirin or some other blood thinner, ask your doctor if and when to start taking it again. Make sure that you understand exactly what your doctor wants you to do.     · Ask your doctor if you can take acetaminophen (Tylenol) for pain. Do not take aspirin for 3 days, unless your doctor says it is okay.     · Check with your doctor before you take aspirin or anti-inflammatory medicines to reduce pain and swelling. These include ibuprofen (Advil, Motrin) and naproxen (Aleve).   · Make sure you know which heart medicines to continue and which ones to stop. Ask your doctor if you aren't sure. Catheter site care    · You can remove your bandages the day after the procedure.     · You may shower 24 to 48 hours after the procedure, if your doctor okays it. Pat the incision dry.     · Do not soak the catheter site until it is healed. Don't take a bath for 1 week, or until your doctor tells you it is okay.     · Watch for bleeding from the site. A small amount of blood (up to the size of a quarter) on the bandage can be normal.     · If you are bleeding, lie down and press on the area for 15 minutes to try to make it stop. If the bleeding does not stop, call your doctor or seek immediate medical care. Follow-up care is a key part of your treatment and safety. Be sure to make and go to all appointments, and call your doctor if you are having problems. It's also a good idea to know your test results and keep a list of the medicines you take. When should you call for help? Call 911  anytime you think you may need emergency care. For example, call if:    · You passed out (lost consciousness).     · You have symptoms of a heart attack. These may include:  ? Chest pain or pressure, or a strange feeling in the chest.  ? Sweating. ? Shortness of breath. ? Nausea or vomiting. ? Pain, pressure, or a strange feeling in the back, neck, jaw, or upper belly, or in one or both shoulders or arms. ? Lightheadedness or sudden weakness. ? A fast or irregular heartbeat. After you call 911, the  may tell you to chew 1 adult-strength or 2 to 4 low-dose aspirin. Wait for an ambulance. Do not try to drive yourself.     · You have symptoms of a stroke. These may include:  ? Sudden numbness, tingling, weakness, or loss of movement in your face, arm, or leg, especially on only one side of your body. ? Sudden vision changes. ? Sudden trouble speaking. ? Sudden confusion or trouble understanding simple statements. ? Sudden problems with walking or balance. ? A sudden, severe headache that is different from past headaches. Call your doctor now or seek immediate medical care if:    · You are bleeding from the area where the catheter was put in your blood vessel.     · You have a fast-growing, painful lump at the catheter site.     · You have signs of infection, such as:  ? Increased pain, swelling, warmth, or redness. ? Red streaks leading from the catheter site. ? Pus draining from the catheter site. ? A fever.     · Your leg, arm, or hand is painful, looks blue, or feels cold, numb, or tingly. Watch closely for any changes in your health, and be sure to contact your doctor if you have any problems. Where can you learn more? Go to https://US FORMING TECHNOLOGIESpeExpertcloud.de.Ballista Securities. org and sign in to your Scentbird account. Enter [phone] in the [employer]eCity box to learn more about \"Electrophysiology Study and Catheter Ablation: What to Expect at Home. \"     If you do not have an account, please click on the \"Sign Up Now\" link.   Current as of: August 31, 2020               Content Version: 12.9  © 1028-5416 Healthwise, Incorporated. Care instructions adapted under license by [facility] ([facility]). If you have questions about a medical condition or this instruction, always ask your healthcare professional. [address] any warranty or liability for your use of this information.

## 2021-08-09 NOTE — PROGRESS NOTES
Pt seen in clinic today for cardiac device interrogation for per-op clearance. Steri strips removed today. Site has healed well. Their device is a MDT ILR   the device appears to be functioning normally. Remaining battery life is \"OK\"    last remote check 8/9/2021 today  No new episodes recorded prior to apt - pt with frequent Tachy and 0.9% At/AF burden - SX episodes align with these. Pt was informed of findings today and general questions have been answered with regard to device. Home monitoring hardware is transmitting.     Results discussed with or to be reviewed by EP

## 2021-08-26 ENCOUNTER — NURSE ONLY (OUTPATIENT)
Dept: CARDIOLOGY CLINIC | Age: 71
End: 2021-08-26
Payer: MEDICARE

## 2021-08-26 DIAGNOSIS — Z98.890 S/P ABLATION OF ATRIAL FIBRILLATION: ICD-10-CM

## 2021-08-26 DIAGNOSIS — I48.91 NEW ONSET ATRIAL FIBRILLATION (HCC): ICD-10-CM

## 2021-08-26 DIAGNOSIS — Z86.79 S/P ABLATION OF ATRIAL FIBRILLATION: ICD-10-CM

## 2021-08-26 DIAGNOSIS — Z45.09 ENCOUNTER FOR ELECTRONIC ANALYSIS OF REVEAL EVENT RECORDER: Chronic | ICD-10-CM

## 2021-08-27 PROCEDURE — G2066 INTER DEVC REMOTE 30D: HCPCS | Performed by: INTERNAL MEDICINE

## 2021-08-27 PROCEDURE — 93298 REM INTERROG DEV EVAL SCRMS: CPT | Performed by: INTERNAL MEDICINE

## 2021-08-27 NOTE — PROGRESS NOTES
ILR for palpitations/suspected AF (h/o atrial fibrillation s/p ablation by Dr. Tsering Vanegas the recent 1-2 years).      We received a remote transmission from patient's monitor at home. Since 07.13.2021:  32 Symptom, 100 Tachy, 8 AF (0.5% burden) w available ECGs illustrating known AF/AFL w RVR. Pt on Toprol XL and Eliquis. Per most recent [address] OV 08.09.21, pt scheduled for AFL ABL 09.21.21.     EP physician to review. We will continue to monitor remotely.

## 2021-09-15 ENCOUNTER — TELEPHONE (OUTPATIENT)
Dept: CARDIOLOGY CLINIC | Age: 71
End: 2021-09-15

## 2021-09-17 ENCOUNTER — TELEPHONE (OUTPATIENT)
Dept: CARDIOLOGY CLINIC | Age: 71
End: 2021-09-17

## 2021-09-17 NOTE — TELEPHONE ENCOUNTER
Enmanuel El called the office reporting worsening palpitation off and on today. She states her blood pressure is 140/70s and her heart rate on her home monitor is 80-90. She denies any associated shortness of breath, lightheadedness, or syncope. She states she took Toprol 12.5 mg today but notes little improvement. Instructed she can take an extra dose of the Toprol 25 mg daily today and for the next few days if symptoms persist. Educated on when to proceed to the ED but otherwise she may take the Toprol and her ablation is scheduled for 9/21/21. She v/u.

## 2021-09-21 ENCOUNTER — ANESTHESIA EVENT (OUTPATIENT)
Dept: CARDIAC CATH/INVASIVE PROCEDURES | Age: 71
End: 2021-09-21

## 2021-09-21 ENCOUNTER — ANESTHESIA (OUTPATIENT)
Dept: CARDIAC CATH/INVASIVE PROCEDURES | Age: 71
End: 2021-09-21

## 2021-09-21 ENCOUNTER — HOSPITAL ENCOUNTER (OUTPATIENT)
Dept: CARDIAC CATH/INVASIVE PROCEDURES | Age: 71
Discharge: HOME | End: 2021-09-21
Payer: MEDICARE

## 2021-09-21 VITALS
OXYGEN SATURATION: 98 % | RESPIRATION RATE: 13 BRPM | WEIGHT: 118 LBS | SYSTOLIC BLOOD PRESSURE: 113 MMHG | HEART RATE: 76 BPM | HEIGHT: 64 IN | BODY MASS INDEX: 20.14 KG/M2 | TEMPERATURE: 96.8 F | DIASTOLIC BLOOD PRESSURE: 64 MMHG

## 2021-09-21 VITALS
OXYGEN SATURATION: 98 % | RESPIRATION RATE: 1 BRPM | DIASTOLIC BLOOD PRESSURE: 81 MMHG | TEMPERATURE: 94.6 F | SYSTOLIC BLOOD PRESSURE: 161 MMHG

## 2021-09-21 DIAGNOSIS — I48.92 LEFT ATRIAL FLUTTER BY ELECTROCARDIOGRAM (HCC): ICD-10-CM

## 2021-09-21 DIAGNOSIS — I48.0 PAROXYSMAL ATRIAL FIBRILLATION (HCC): ICD-10-CM

## 2021-09-21 LAB
ABO/RH: NORMAL
ANTIBODY SCREEN: NORMAL
EKG ATRIAL RATE: 76 BPM
EKG DIAGNOSIS: NORMAL
EKG P AXIS: 14 DEGREES
EKG P-R INTERVAL: 150 MS
EKG Q-T INTERVAL: 400 MS
EKG QRS DURATION: 66 MS
EKG QTC CALCULATION (BAZETT): 450 MS
EKG R AXIS: 33 DEGREES
EKG T AXIS: 44 DEGREES
EKG VENTRICULAR RATE: 76 BPM
POC ACT LR: 273 SEC
POC ACT LR: >400 SEC
SARS-COV-2, NAAT: NOT DETECTED

## 2021-09-21 PROCEDURE — C1730 CATH, EP, 19 OR FEW ELECT: HCPCS

## 2021-09-21 PROCEDURE — 2500000003 HC RX 250 WO HCPCS: Performed by: NURSE ANESTHETIST, CERTIFIED REGISTERED

## 2021-09-21 PROCEDURE — C1759 CATH, INTRA ECHOCARDIOGRAPHY: HCPCS

## 2021-09-21 PROCEDURE — 93656 COMPRE EP EVAL ABLTJ ATR FIB: CPT | Performed by: INTERNAL MEDICINE

## 2021-09-21 PROCEDURE — 93655 ICAR CATH ABLTJ DSCRT ARRHYT: CPT | Performed by: INTERNAL MEDICINE

## 2021-09-21 PROCEDURE — 87635 SARS-COV-2 COVID-19 AMP PRB: CPT

## 2021-09-21 PROCEDURE — 93622 COMP EP EVAL L VENTR PAC&REC: CPT | Performed by: INTERNAL MEDICINE

## 2021-09-21 PROCEDURE — 7100000000 HC PACU RECOVERY - FIRST 15 MIN

## 2021-09-21 PROCEDURE — 93613 INTRACARDIAC EPHYS 3D MAPG: CPT | Performed by: INTERNAL MEDICINE

## 2021-09-21 PROCEDURE — 86900 BLOOD TYPING SEROLOGIC ABO: CPT

## 2021-09-21 PROCEDURE — 93657 TX L/R ATRIAL FIB ADDL: CPT | Performed by: FAMILY MEDICINE

## 2021-09-21 PROCEDURE — 93622 COMP EP EVAL L VENTR PAC&REC: CPT | Performed by: FAMILY MEDICINE

## 2021-09-21 PROCEDURE — 93005 ELECTROCARDIOGRAM TRACING: CPT | Performed by: INTERNAL MEDICINE

## 2021-09-21 PROCEDURE — 93657 TX L/R ATRIAL FIB ADDL: CPT | Performed by: INTERNAL MEDICINE

## 2021-09-21 PROCEDURE — 3700000000 HC ANESTHESIA ATTENDED CARE

## 2021-09-21 PROCEDURE — C1894 INTRO/SHEATH, NON-LASER: HCPCS

## 2021-09-21 PROCEDURE — C1893 INTRO/SHEATH, FIXED,NON-PEEL: HCPCS

## 2021-09-21 PROCEDURE — 93655 ICAR CATH ABLTJ DSCRT ARRHYT: CPT | Performed by: FAMILY MEDICINE

## 2021-09-21 PROCEDURE — 93613 INTRACARDIAC EPHYS 3D MAPG: CPT | Performed by: FAMILY MEDICINE

## 2021-09-21 PROCEDURE — 93623 PRGRMD STIMJ&PACG IV RX NFS: CPT | Performed by: FAMILY MEDICINE

## 2021-09-21 PROCEDURE — 85347 COAGULATION TIME ACTIVATED: CPT

## 2021-09-21 PROCEDURE — C1732 CATH, EP, DIAG/ABL, 3D/VECT: HCPCS

## 2021-09-21 PROCEDURE — 93662 INTRACARDIAC ECG (ICE): CPT | Performed by: FAMILY MEDICINE

## 2021-09-21 PROCEDURE — 7100000001 HC PACU RECOVERY - ADDTL 15 MIN

## 2021-09-21 PROCEDURE — 2580000003 HC RX 258

## 2021-09-21 PROCEDURE — 86850 RBC ANTIBODY SCREEN: CPT

## 2021-09-21 PROCEDURE — 93656 COMPRE EP EVAL ABLTJ ATR FIB: CPT | Performed by: FAMILY MEDICINE

## 2021-09-21 PROCEDURE — 93662 INTRACARDIAC ECG (ICE): CPT | Performed by: INTERNAL MEDICINE

## 2021-09-21 PROCEDURE — 2500000003 HC RX 250 WO HCPCS

## 2021-09-21 PROCEDURE — 6360000002 HC RX W HCPCS

## 2021-09-21 PROCEDURE — 93623 PRGRMD STIMJ&PACG IV RX NFS: CPT | Performed by: INTERNAL MEDICINE

## 2021-09-21 PROCEDURE — 6360000002 HC RX W HCPCS: Performed by: NURSE ANESTHETIST, CERTIFIED REGISTERED

## 2021-09-21 PROCEDURE — 86901 BLOOD TYPING SEROLOGIC RH(D): CPT

## 2021-09-21 PROCEDURE — 2720000010 HC SURG SUPPLY STERILE

## 2021-09-21 PROCEDURE — 2580000003 HC RX 258: Performed by: NURSE ANESTHETIST, CERTIFIED REGISTERED

## 2021-09-21 PROCEDURE — 93010 ELECTROCARDIOGRAM REPORT: CPT | Performed by: INTERNAL MEDICINE

## 2021-09-21 PROCEDURE — 2709999900 HC NON-CHARGEABLE SUPPLY

## 2021-09-21 PROCEDURE — 6370000000 HC RX 637 (ALT 250 FOR IP)

## 2021-09-21 PROCEDURE — 3700000001 HC ADD 15 MINUTES (ANESTHESIA)

## 2021-09-21 RX ORDER — OXYCODONE HYDROCHLORIDE 10 MG/1
10 TABLET ORAL PRN
Status: DISCONTINUED | OUTPATIENT
Start: 2021-09-21 | End: 2021-09-21

## 2021-09-21 RX ORDER — SODIUM CHLORIDE 0.9 % (FLUSH) 0.9 %
5-40 SYRINGE (ML) INJECTION EVERY 12 HOURS SCHEDULED
Status: DISCONTINUED | OUTPATIENT
Start: 2021-09-21 | End: 2021-09-22 | Stop reason: HOSPADM

## 2021-09-21 RX ORDER — LIDOCAINE HYDROCHLORIDE AND EPINEPHRINE BITARTRATE 20; .01 MG/ML; MG/ML
15 INJECTION, SOLUTION SUBCUTANEOUS SEE ADMIN INSTRUCTIONS
Status: DISCONTINUED | OUTPATIENT
Start: 2021-09-21 | End: 2021-09-22 | Stop reason: HOSPADM

## 2021-09-21 RX ORDER — PROTAMINE SULFATE 10 MG/ML
30 INJECTION, SOLUTION INTRAVENOUS
Status: ACTIVE | OUTPATIENT
Start: 2021-09-21 | End: 2021-09-21

## 2021-09-21 RX ORDER — DOBUTAMINE HYDROCHLORIDE 200 MG/100ML
INJECTION INTRAVENOUS CONTINUOUS PRN
Status: DISCONTINUED | OUTPATIENT
Start: 2021-09-21 | End: 2021-09-21 | Stop reason: SDUPTHER

## 2021-09-21 RX ORDER — VECURONIUM BROMIDE 1 MG/ML
INJECTION, POWDER, LYOPHILIZED, FOR SOLUTION INTRAVENOUS PRN
Status: DISCONTINUED | OUTPATIENT
Start: 2021-09-21 | End: 2021-09-21 | Stop reason: SDUPTHER

## 2021-09-21 RX ORDER — GLYCOPYRROLATE 0.2 MG/ML
INJECTION INTRAMUSCULAR; INTRAVENOUS PRN
Status: DISCONTINUED | OUTPATIENT
Start: 2021-09-21 | End: 2021-09-21 | Stop reason: SDUPTHER

## 2021-09-21 RX ORDER — ONDANSETRON 2 MG/ML
4 INJECTION INTRAMUSCULAR; INTRAVENOUS
Status: DISCONTINUED | OUTPATIENT
Start: 2021-09-21 | End: 2021-09-21

## 2021-09-21 RX ORDER — SODIUM CHLORIDE 0.9 % (FLUSH) 0.9 %
5-40 SYRINGE (ML) INJECTION PRN
Status: DISCONTINUED | OUTPATIENT
Start: 2021-09-21 | End: 2021-09-22 | Stop reason: HOSPADM

## 2021-09-21 RX ORDER — FLECAINIDE ACETATE 50 MG/1
25 TABLET ORAL EVERY 12 HOURS SCHEDULED
Qty: 30 TABLET | Refills: 3 | Status: SHIPPED | OUTPATIENT
Start: 2021-09-21 | End: 2021-12-21 | Stop reason: ALTCHOICE

## 2021-09-21 RX ORDER — DIPHENHYDRAMINE HYDROCHLORIDE 50 MG/ML
INJECTION INTRAMUSCULAR; INTRAVENOUS PRN
Status: DISCONTINUED | OUTPATIENT
Start: 2021-09-21 | End: 2021-09-21 | Stop reason: SDUPTHER

## 2021-09-21 RX ORDER — FENTANYL CITRATE 50 UG/ML
INJECTION, SOLUTION INTRAMUSCULAR; INTRAVENOUS PRN
Status: DISCONTINUED | OUTPATIENT
Start: 2021-09-21 | End: 2021-09-21 | Stop reason: SDUPTHER

## 2021-09-21 RX ORDER — MORPHINE SULFATE 2 MG/ML
1 INJECTION, SOLUTION INTRAMUSCULAR; INTRAVENOUS EVERY 5 MIN PRN
Status: DISCONTINUED | OUTPATIENT
Start: 2021-09-21 | End: 2021-09-21

## 2021-09-21 RX ORDER — 0.9 % SODIUM CHLORIDE 0.9 %
1000 INTRAVENOUS SOLUTION INTRAVENOUS
Status: ACTIVE | OUTPATIENT
Start: 2021-09-21 | End: 2021-09-21

## 2021-09-21 RX ORDER — PROTAMINE SULFATE 10 MG/ML
INJECTION, SOLUTION INTRAVENOUS PRN
Status: DISCONTINUED | OUTPATIENT
Start: 2021-09-21 | End: 2021-09-21 | Stop reason: SDUPTHER

## 2021-09-21 RX ORDER — FUROSEMIDE 10 MG/ML
40 INJECTION INTRAMUSCULAR; INTRAVENOUS ONCE
Status: DISCONTINUED | OUTPATIENT
Start: 2021-09-21 | End: 2021-09-22 | Stop reason: HOSPADM

## 2021-09-21 RX ORDER — MEPERIDINE HYDROCHLORIDE 25 MG/ML
12.5 INJECTION INTRAMUSCULAR; INTRAVENOUS; SUBCUTANEOUS EVERY 5 MIN PRN
Status: DISCONTINUED | OUTPATIENT
Start: 2021-09-21 | End: 2021-09-21

## 2021-09-21 RX ORDER — METOPROLOL SUCCINATE 25 MG/1
12.5 TABLET, EXTENDED RELEASE ORAL DAILY PRN
Status: DISCONTINUED | OUTPATIENT
Start: 2021-09-21 | End: 2021-09-22 | Stop reason: HOSPADM

## 2021-09-21 RX ORDER — SODIUM CHLORIDE 9 MG/ML
INJECTION, SOLUTION INTRAVENOUS CONTINUOUS PRN
Status: DISCONTINUED | OUTPATIENT
Start: 2021-09-21 | End: 2021-09-21 | Stop reason: SDUPTHER

## 2021-09-21 RX ORDER — PHENYLEPHRINE HCL IN 0.9% NACL 1 MG/10 ML
SYRINGE (ML) INTRAVENOUS PRN
Status: DISCONTINUED | OUTPATIENT
Start: 2021-09-21 | End: 2021-09-21 | Stop reason: SDUPTHER

## 2021-09-21 RX ORDER — HEPARIN SODIUM 1000 [USP'U]/ML
INJECTION, SOLUTION INTRAVENOUS; SUBCUTANEOUS PRN
Status: DISCONTINUED | OUTPATIENT
Start: 2021-09-21 | End: 2021-09-21 | Stop reason: SDUPTHER

## 2021-09-21 RX ORDER — FLECAINIDE ACETATE 50 MG/1
25 TABLET ORAL EVERY 12 HOURS SCHEDULED
Status: DISCONTINUED | OUTPATIENT
Start: 2021-09-21 | End: 2021-09-22 | Stop reason: HOSPADM

## 2021-09-21 RX ORDER — DEXAMETHASONE SODIUM PHOSPHATE 4 MG/ML
INJECTION, SOLUTION INTRA-ARTICULAR; INTRALESIONAL; INTRAMUSCULAR; INTRAVENOUS; SOFT TISSUE PRN
Status: DISCONTINUED | OUTPATIENT
Start: 2021-09-21 | End: 2021-09-21 | Stop reason: SDUPTHER

## 2021-09-21 RX ORDER — SODIUM CHLORIDE 9 MG/ML
INJECTION, SOLUTION INTRAVENOUS CONTINUOUS
Status: DISCONTINUED | OUTPATIENT
Start: 2021-09-21 | End: 2021-09-21

## 2021-09-21 RX ORDER — MORPHINE SULFATE 2 MG/ML
2 INJECTION, SOLUTION INTRAMUSCULAR; INTRAVENOUS EVERY 5 MIN PRN
Status: DISCONTINUED | OUTPATIENT
Start: 2021-09-21 | End: 2021-09-21

## 2021-09-21 RX ORDER — FENTANYL CITRATE 50 UG/ML
25 INJECTION, SOLUTION INTRAMUSCULAR; INTRAVENOUS EVERY 5 MIN PRN
Status: DISCONTINUED | OUTPATIENT
Start: 2021-09-21 | End: 2021-09-21

## 2021-09-21 RX ORDER — SODIUM CHLORIDE 9 MG/ML
25 INJECTION, SOLUTION INTRAVENOUS PRN
Status: DISCONTINUED | OUTPATIENT
Start: 2021-09-21 | End: 2021-09-22 | Stop reason: HOSPADM

## 2021-09-21 RX ORDER — PROPOFOL 10 MG/ML
INJECTION, EMULSION INTRAVENOUS PRN
Status: DISCONTINUED | OUTPATIENT
Start: 2021-09-21 | End: 2021-09-21 | Stop reason: SDUPTHER

## 2021-09-21 RX ORDER — SODIUM CHLORIDE 9 MG/ML
INJECTION INTRAVENOUS PRN
Status: DISCONTINUED | OUTPATIENT
Start: 2021-09-21 | End: 2021-09-21 | Stop reason: SDUPTHER

## 2021-09-21 RX ORDER — ONDANSETRON 2 MG/ML
INJECTION INTRAMUSCULAR; INTRAVENOUS PRN
Status: DISCONTINUED | OUTPATIENT
Start: 2021-09-21 | End: 2021-09-21 | Stop reason: SDUPTHER

## 2021-09-21 RX ORDER — FENTANYL CITRATE 50 UG/ML
50 INJECTION, SOLUTION INTRAMUSCULAR; INTRAVENOUS EVERY 5 MIN PRN
Status: DISCONTINUED | OUTPATIENT
Start: 2021-09-21 | End: 2021-09-21

## 2021-09-21 RX ORDER — OXYCODONE HYDROCHLORIDE 5 MG/1
5 TABLET ORAL PRN
Status: DISCONTINUED | OUTPATIENT
Start: 2021-09-21 | End: 2021-09-21

## 2021-09-21 RX ORDER — SUCCINYLCHOLINE/SOD CL,ISO/PF 200MG/10ML
SYRINGE (ML) INTRAVENOUS PRN
Status: DISCONTINUED | OUTPATIENT
Start: 2021-09-21 | End: 2021-09-21 | Stop reason: SDUPTHER

## 2021-09-21 RX ORDER — MIDAZOLAM HYDROCHLORIDE 1 MG/ML
INJECTION INTRAMUSCULAR; INTRAVENOUS PRN
Status: DISCONTINUED | OUTPATIENT
Start: 2021-09-21 | End: 2021-09-21 | Stop reason: SDUPTHER

## 2021-09-21 RX ORDER — LIDOCAINE HYDROCHLORIDE 10 MG/ML
INJECTION, SOLUTION EPIDURAL; INFILTRATION; INTRACAUDAL; PERINEURAL PRN
Status: DISCONTINUED | OUTPATIENT
Start: 2021-09-21 | End: 2021-09-21 | Stop reason: SDUPTHER

## 2021-09-21 RX ORDER — ACETAMINOPHEN 325 MG/1
650 TABLET ORAL EVERY 4 HOURS PRN
Status: DISCONTINUED | OUTPATIENT
Start: 2021-09-21 | End: 2021-09-22 | Stop reason: HOSPADM

## 2021-09-21 RX ORDER — HEPARIN SODIUM 10000 [USP'U]/100ML
INJECTION, SOLUTION INTRAVENOUS CONTINUOUS PRN
Status: DISCONTINUED | OUTPATIENT
Start: 2021-09-21 | End: 2021-09-21 | Stop reason: SDUPTHER

## 2021-09-21 RX ADMIN — PROPOFOL 30 MG: 10 INJECTION, EMULSION INTRAVENOUS at 09:22

## 2021-09-21 RX ADMIN — VECURONIUM BROMIDE 2 MG: 1 INJECTION, POWDER, LYOPHILIZED, FOR SOLUTION INTRAVENOUS at 09:22

## 2021-09-21 RX ADMIN — MIDAZOLAM 2 MG: 1 INJECTION INTRAMUSCULAR; INTRAVENOUS at 07:55

## 2021-09-21 RX ADMIN — SUGAMMADEX 200 MG: 100 INJECTION, SOLUTION INTRAVENOUS at 09:57

## 2021-09-21 RX ADMIN — PROPOFOL 70 MG: 10 INJECTION, EMULSION INTRAVENOUS at 08:00

## 2021-09-21 RX ADMIN — Medication 200 MCG: at 08:58

## 2021-09-21 RX ADMIN — HEPARIN SODIUM 2000 UNITS: 1000 INJECTION INTRAVENOUS; SUBCUTANEOUS at 09:28

## 2021-09-21 RX ADMIN — ONDANSETRON 4 MG: 2 INJECTION INTRAMUSCULAR; INTRAVENOUS at 08:05

## 2021-09-21 RX ADMIN — DOBUTAMINE HYDROCHLORIDE 10 MCG/KG/MIN: 200 INJECTION INTRAVENOUS at 09:47

## 2021-09-21 RX ADMIN — DIPHENHYDRAMINE HYDROCHLORIDE 25 MG: 50 INJECTION, SOLUTION INTRAMUSCULAR; INTRAVENOUS at 10:04

## 2021-09-21 RX ADMIN — SODIUM CHLORIDE 5 ML: 9 INJECTION INTRAMUSCULAR; INTRAVENOUS; SUBCUTANEOUS at 08:03

## 2021-09-21 RX ADMIN — Medication 100 MG: at 08:00

## 2021-09-21 RX ADMIN — Medication 100 MCG: at 08:20

## 2021-09-21 RX ADMIN — VECURONIUM BROMIDE 5 MG: 1 INJECTION, POWDER, LYOPHILIZED, FOR SOLUTION INTRAVENOUS at 08:03

## 2021-09-21 RX ADMIN — GLYCOPYRROLATE 0.2 MG: 0.2 INJECTION, SOLUTION INTRAMUSCULAR; INTRAVENOUS at 08:05

## 2021-09-21 RX ADMIN — SODIUM CHLORIDE: 9 INJECTION, SOLUTION INTRAVENOUS at 07:54

## 2021-09-21 RX ADMIN — HEPARIN SODIUM 2500 UNITS: 1000 INJECTION INTRAVENOUS; SUBCUTANEOUS at 09:07

## 2021-09-21 RX ADMIN — FENTANYL CITRATE 100 MCG: 50 INJECTION INTRAMUSCULAR; INTRAVENOUS at 07:56

## 2021-09-21 RX ADMIN — SODIUM CHLORIDE: 9 INJECTION, SOLUTION INTRAVENOUS at 08:05

## 2021-09-21 RX ADMIN — PROTAMINE SULFATE 150 MG: 10 INJECTION, SOLUTION INTRAVENOUS at 09:57

## 2021-09-21 RX ADMIN — HEPARIN SODIUM 2500 UNITS: 1000 INJECTION INTRAVENOUS; SUBCUTANEOUS at 08:58

## 2021-09-21 RX ADMIN — HEPARIN SODIUM 5000 UNITS/HR: 10000 INJECTION, SOLUTION INTRAVENOUS at 09:28

## 2021-09-21 RX ADMIN — SODIUM CHLORIDE 2 ML: 9 INJECTION INTRAMUSCULAR; INTRAVENOUS; SUBCUTANEOUS at 09:22

## 2021-09-21 RX ADMIN — LIDOCAINE HYDROCHLORIDE 40 MG: 10 INJECTION, SOLUTION EPIDURAL; INFILTRATION; INTRACAUDAL; PERINEURAL at 08:00

## 2021-09-21 RX ADMIN — Medication 100 MCG: at 09:07

## 2021-09-21 RX ADMIN — DEXAMETHASONE SODIUM PHOSPHATE 8 MG: 4 INJECTION, SOLUTION INTRAMUSCULAR; INTRAVENOUS at 08:05

## 2021-09-21 RX ADMIN — Medication 100 MCG: at 08:45

## 2021-09-21 ASSESSMENT — PULMONARY FUNCTION TESTS
PIF_VALUE: 11
PIF_VALUE: 1
PIF_VALUE: 11
PIF_VALUE: 11
PIF_VALUE: 13
PIF_VALUE: 12
PIF_VALUE: 11
PIF_VALUE: 12
PIF_VALUE: 12
PIF_VALUE: 11
PIF_VALUE: 12
PIF_VALUE: 11
PIF_VALUE: 12
PIF_VALUE: 11
PIF_VALUE: 1
PIF_VALUE: 12
PIF_VALUE: 11
PIF_VALUE: 0
PIF_VALUE: 11
PIF_VALUE: 0
PIF_VALUE: 11
PIF_VALUE: 12
PIF_VALUE: 0
PIF_VALUE: 12
PIF_VALUE: 11
PIF_VALUE: 13
PIF_VALUE: 12
PIF_VALUE: 12
PIF_VALUE: 11
PIF_VALUE: 12
PIF_VALUE: 12
PIF_VALUE: 13
PIF_VALUE: 12
PIF_VALUE: 12
PIF_VALUE: 11
PIF_VALUE: 11
PIF_VALUE: 12
PIF_VALUE: 11
PIF_VALUE: 12
PIF_VALUE: 12
PIF_VALUE: 11
PIF_VALUE: 13
PIF_VALUE: 0
PIF_VALUE: 0
PIF_VALUE: 11
PIF_VALUE: 13
PIF_VALUE: 12
PIF_VALUE: 15
PIF_VALUE: 12
PIF_VALUE: 12
PIF_VALUE: 11
PIF_VALUE: 12
PIF_VALUE: 11
PIF_VALUE: 12
PIF_VALUE: 11
PIF_VALUE: 12
PIF_VALUE: 1
PIF_VALUE: 12
PIF_VALUE: 11
PIF_VALUE: 12
PIF_VALUE: 11
PIF_VALUE: 11
PIF_VALUE: 12
PIF_VALUE: 1
PIF_VALUE: 11
PIF_VALUE: 12
PIF_VALUE: 12
PIF_VALUE: 13
PIF_VALUE: 11
PIF_VALUE: 13
PIF_VALUE: 11
PIF_VALUE: 11
PIF_VALUE: 12
PIF_VALUE: 12
PIF_VALUE: 11
PIF_VALUE: 12
PIF_VALUE: 11
PIF_VALUE: 11
PIF_VALUE: 1
PIF_VALUE: 28
PIF_VALUE: 1
PIF_VALUE: 11
PIF_VALUE: 12
PIF_VALUE: 11
PIF_VALUE: 11
PIF_VALUE: 12
PIF_VALUE: 12
PIF_VALUE: 11
PIF_VALUE: 12
PIF_VALUE: 11
PIF_VALUE: 12
PIF_VALUE: 13
PIF_VALUE: 12
PIF_VALUE: 11
PIF_VALUE: 12
PIF_VALUE: 7
PIF_VALUE: 12
PIF_VALUE: 12
PIF_VALUE: 11
PIF_VALUE: 11
PIF_VALUE: 30
PIF_VALUE: 11
PIF_VALUE: 11
PIF_VALUE: 12
PIF_VALUE: 11
PIF_VALUE: 12
PIF_VALUE: 13
PIF_VALUE: 11
PIF_VALUE: 12
PIF_VALUE: 13
PIF_VALUE: 13
PIF_VALUE: 11
PIF_VALUE: 12

## 2021-09-21 ASSESSMENT — ENCOUNTER SYMPTOMS: SHORTNESS OF BREATH: 0

## 2021-09-21 NOTE — ANESTHESIA PRE PROCEDURE
Department of Anesthesiology  Preprocedure Note       Name:  Michael Brito   Age:  66 y.o.  :  1950                                          MRN:  [id number]         Date:  2021      Surgeon: * Surgery not found *    Procedure: a fib ablation    Medications prior to admission:   Prior to Admission medications    Medication Sig Start Date End Date Taking? Authorizing Provider   metoprolol succinate (TOPROL XL) 25 MG extended release tablet Take 0.5 tablets by mouth daily as needed (palpitations) 21   BRUNILDA Hawkins - CNP   famotidine (PEPCID) 20 MG tablet Take 20 mg by mouth 2 times daily    Historical Provider, MD   apixaban (ELIQUIS) 5 MG TABS tablet Take 1 tablet by mouth 2 times daily 20   Mirela Anand MD   Calcium-Magnesium-Vitamin D (CALCIUM 500 PO) Take 500 mg by mouth daily    Historical Provider, MD   calcitonin, salmon, (MIACALCIN) 200 UNIT/ACT nasal spray 1 spray by Nasal route daily. Historical Provider, MD       Current medications:    Current Outpatient Medications   Medication Sig Dispense Refill    metoprolol succinate (TOPROL XL) 25 MG extended release tablet Take 0.5 tablets by mouth daily as needed (palpitations) 30 tablet 3    famotidine (PEPCID) 20 MG tablet Take 20 mg by mouth 2 times daily      apixaban (ELIQUIS) 5 MG TABS tablet Take 1 tablet by mouth 2 times daily 180 tablet 3    Calcium-Magnesium-Vitamin D (CALCIUM 500 PO) Take 500 mg by mouth daily      calcitonin, salmon, (MIACALCIN) 200 UNIT/ACT nasal spray 1 spray by Nasal route daily. No current facility-administered medications for this visit.        Allergies:  No Known Allergies    Problem List:    Patient Active Problem List   Diagnosis Code    Dizziness R42    New onset atrial fibrillation (HCC) I48.91    Near syncope R55    Idiopathic hypotension I95.0    Paroxysmal atrial fibrillation (HCC) I48.0    S/P ablation of atrial fibrillation Z98.890, Z86.79    Medtronic [id number] Reveal LINQ Z45.09       Past Medical History:        Diagnosis Date    Acid reflux     Atrial fibrillation [facility])        Past Surgical History:        Procedure Laterality Date    COLONOSCOPY  11/04/2014    ENDOSCOPY, COLON, DIAGNOSTIC  11/04/2014    with biopsy   [address] Dr, Dr.       Social History:    Social History     Tobacco Use    Smoking status: Never Smoker    Smokeless tobacco: Never Used   Substance Use Topics    Alcohol use: No                                Counseling given: Not Answered      Vital Signs (Current): There were no vitals filed for this visit.                                            BP Readings from Last 3 Encounters:   08/09/21 118/68   07/13/21 112/68   04/28/21 104/60       NPO Status:   >8hrs                                                                                BMI:   Wt Readings from Last 3 Encounters:   08/09/21 118 lb (53.5 kg)   07/13/21 123 lb 7.3 oz (56 kg)   04/28/21 123 lb (55.8 kg)     There is no height or weight on file to calculate BMI.    CBC:   Lab Results   Component Value Date    WBC 5.3 09/16/2021    RBC 4.25 09/16/2021    HGB 12.6 09/16/2021    HCT 37.8 09/16/2021    MCV 89.0 09/16/2021    RDW 13.2 09/16/2021     09/16/2021       CMP:   Lab Results   Component Value Date     09/16/2021    K 4.9 09/16/2021    K 3.9 07/13/2021     09/16/2021    CO2 24 09/16/2021    BUN 18 09/16/2021    CREATININE 0.9 09/16/2021    GFRAA >60 09/16/2021    GFRAA >60 04/04/2012    AGRATIO 1.5 07/12/2021    LABGLOM >60 09/16/2021    GLUCOSE 86 09/16/2021    PROT 6.5 07/12/2021    PROT 7.6 04/04/2012    CALCIUM 10.0 09/16/2021    BILITOT <0.2 07/12/2021    ALKPHOS 85 07/12/2021    AST 20 07/12/2021    ALT 16 07/12/2021       POC Tests: No results for input(s): POCGLU, POCNA, POCK, POCCL, POCBUN, POCHEMO, POCT in the last 72 hours. Coags:   Lab Results   Component Value Date    PROTIME 13.4 04/26/2021    INR 1.15 04/26/2021    APTT 37.6 04/26/2021       HCG (If Applicable): No results found for: PREGTESTUR, PREGSERUM, HCG, HCGQUANT     ABGs: No results found for: PHART, PO2ART, NBZ5OAU, DOX1SNB, BEART, J4FVRVWR     Type & Screen (If Applicable):  No results found for: LABABO, LABRH    Drug/Infectious Status (If Applicable):  No results found for: HIV, HEPCAB    COVID-19 Screening (If Applicable):   Lab Results   Component Value Date    COVID19 NOT DETECTED 08/05/2020         Anesthesia Evaluation  Patient summary reviewed no history of anesthetic complications:   Airway: Mallampati: II  TM distance: >3 FB   Neck ROM: full  Mouth opening: > = 3 FB Dental:    (+) upper dentures      Pulmonary: breath sounds clear to auscultation      (-) COPD, asthma, shortness of breath, recent URI and sleep apnea                          ROS comment: States sinus issues, no sense of taste or smell x 20 years    Cardiovascular:    (+) hypertension:, dysrhythmias: atrial fibrillation,     (-) valvular problems/murmurs, past MI, CAD,  angina,  CHF, murmur and no pulmonary hypertension    ECG reviewed  Rhythm: regular  Rate: normal                    Neuro/Psych:      (-) seizures, neuromuscular disease, TIA, CVA and headaches           GI/Hepatic/Renal:   (+) GERD: well controlled,      (-) PUD, hepatitis, liver disease, no renal disease and bowel prep       Endo/Other:    (+) blood dyscrasia (eliquis)::., .    (-) diabetes mellitus, hypothyroidism, hyperthyroidism               Abdominal:             Vascular: Other Findings:               Anesthesia Plan      general     ASA 3       Induction: intravenous. MIPS: Prophylactic antiemetics administered. Anesthetic plan and risks discussed with patient. Plan discussed with CRNA.               This pre-anesthesia assessment may be used as a history and physical.    DOS STAFF ADDENDUM:    Pt seen and examined, chart reviewed (including anesthesia, drug and allergy history). No interval changes to history and physical examination. Anesthetic plan, risks, benefits, alternatives, and personnel involved discussed with patient. Patient verbalized an understanding and agrees to proceed.       Faizan Johnston MD  September 21, 2021  6:44 AM    Faizan Johnston MD   9/21/2021

## 2021-09-21 NOTE — PROGRESS NOTES
Admitted to PACU13 from cath lab, pt drowsy but arousable and follows commands. VSS on 4L o2 NC. Report recd from anesthesia.

## 2021-09-21 NOTE — PROCEDURES
[facility]     Electrophysiology Procedure Note       Date of Procedure: 9/21/2021  Patient's Name: José Miguel Sinha  YOB: 1950   Medical Record Number: [id number]  Referring Physician: Sybil Araya DO  Procedure Performed by: Pedro Luis Robledo MD    Procedure performed:  · Electrophysiology study with radiofrequency ablation of atrial fibrillation and pulmonary vein isolation   · Ablation of roof-dependent left atrial flutter with CL 243ms with near proximal-distal activation  · Additional ablation with creation of a roof line (for left atrial flutter)  · Additional ablation of complex fractionated atrial electrograms (for atrial fibrillation)  · 3-D electroanatomical mapping of the left atrium    · Transseptal puncture through an intact septum x 2 under intracardiac ultrasound guidance without fluoroscopic guidance   · Intracardiac echocardiography  · Left ventricular pacing and recording  · Drug infusion with an attempt to induce atrial tachydysrhythmia  · Anesthesia: General anesthesia provided by the Anesthesia service    Indications for procedure:    José Miguel Sinha is a 66 y.o. female who has a history of paroxysmal atrial fibrillation s/p afib ablation 8/2020 with subsequent occurrence of left atrial flutter who is symptomatic with symptoms of dyspnea with minimal exertion and fatigue who has failed antiarrhythmic therapy in the past is now here for an ablation for left atrial flutter. Details of Procedure: The risks, benefits and alternatives of the ablation procedure were discussed with the patient. The risks including, but not limited to, the risks of bleeding, infection, radiation exposure, injury to vascular, cardiac and surrounding structures (including pneumothorax), stroke, cardiac perforation, tamponade, need for emergent open heart surgery, need for pacemaker implantation, esophageal injury and fistula, myocardial infarction and death were discussed in detail.  The patient was also counseled at length about the risks of willie Covid-19 in the zachary-operative and post-operative states including the recovery window of their procedure. The patient was made aware that willie Covid-19 after a surgical procedure may worsen their prognosis for recovering from the virus and lend to a higher morbidity and or mortality risk. The patient was given the option of postponing their procedure. The patient was also presented reasonable alternatives to the proposed care, treatment, and services. The discussion I have had with the patient encompassed risks, benefits, and side effects related to the alternatives and the risks related to not receiving the proposed care, treatment and services. The patient opted to proceed with the ablation. Written informed consent was signed and placed in the chart. Patient was brought to the EP lab in a fasting non-sedate state. Patient underwent general anesthesia by anesthesia team. The patient was monitored continuously with ECG, pulse oximetry, blood pressure monitoring, and direct observation. No urinary green was placed in order to prevent any hematuria or urinary tract infection. Both groins were prepped in a sterile fashion. We gained access in the right femoral vein. One 8 Italian short sheath for ICE and subsequently for CS catheters were placed in the right femoral vein using modified seldinger technique. Two 8.5F SLO sheaths were introduced into the right femoral vein using modified Seldinger technique. Then a CS cathter was placed inside the coronary sinus without fluoroscopy but with 3-D electroanatomic mapping for recording and mapping of the left atrium. Using ICE we delineated the left pulmonary vein, left atrial appendage,  mitral valve, and right superior and right inferior pulmonary veins, and the trivial amount of pericardial effusion prior to ablation.      Two transeptal punctures were performed under intracardiac echocardiogram, pressure monitoring, and without fluoroscopic guidance. Patient received a bolus of heparin shortly after each transseptal puncture followed by continuous monitoring of the ACT every 15-30 minutes, and additional boluses of heparin during the procedure to keep the ACT between 300-400 sec. We placed both SLO sheaths inside the left atrium. Also an esophageal temperature probe (NovaTeSpace Monkey Temperature Probe 12Fr) that was tied with sutures to an accompanying [employer] quadripolar 6 Romansh 5-5-5 electrode spacing catheter was advanced into the esophagus for real-time mapping of the esophagus and careful monitoring of the esophageal temperature during ablation. Using the Penta ray cathter and Carto navigation system a three dimensional electro anatomical mapping of the left atrium, in addition to right and left sided pulmonary vein anatomy was created. During sinus rhythm, we found that the three pulmonary veins (left superior, left inferior, right superior) did not have PV fascicles, the triggers for the atrial fibrillation. The L antrum was entirely electrically isolated. The RIPV had PV fascicles, and the R antrum, specifically the R middle posterior through R anterior antrum was not electrically isolated. The only part of the R antrum that was electrically isolated was the high posterior antrum. With rapid atrial pacing at 200 ms, we were able to easily induce paroxysmal atrial fibrillation which then organized into a left atrial flutter with CL 243ms with near proximal-distal activation, most likely utilizing the roof of the LA. It would always terminate within 5-10 minutes. We then re-induced the paroxysmal atrial fibrillation and left atrial flutter and started ablating along the R antrum. We then proceeded with a pulmonary vein isolation via ablation.  Wide area circumferential ablations for the right sided pulmonary veins were performed which resulted in electrical isolation of these pulmonary veins and eradication of the PV fascicles. When starting this ablation, the patient's atrial fibrillation and roof-dependent left atrial flutter terminated to sinus rhythm for the remainder of the procedure. Given that the patient manifested roof-dependent left atrial flutter, we ablated a linear line along the roof of the left atrium. We then evaluated the left atrium for complex fractionated atrial electrogram, a separate mechanism that would initiate and/or perpetuate atrial fibrillation apart from the pulmonary vein fascicles and antral ablations. We found 2 sites on the septal aspect of the LA and ablated these foci. After ablation was complete, catheters were placed in the left and right atrium, His-position, right ventricle, and left ventricle for pacing and recording. Arrhythmia was attempted to be induced by rapid atrial and ventricular pacing, and there was no induction of atrial tachydysrhythmia. Maximum output (20mA) pacing in the L antrum and R antrum were also performed and showed dissociation from the rest of the left atrium. This was checked circumferentially around the antrums and verified many times. We evaluated the roof line and found that there was complete, bidirectional block. Adenosine bolus of 18mg was also given for each of the 2 right-sided pulmonary veins to assess for acute re-connection and to attempt to induce atrial fibrillation. We had adequate adenosine effect (AV blocks of > 3 seconds) and there were no pulmonary fascicles seen in any of the veins nor were there any atrial tachydysrhythmia induced. We then administered dobutamine infusion up to 10 mcg/kg/min in order to achieve at least a 20% increase in heart rate from the basal heart rate to induce any atrial tachydysrhythmia, and there were no atrial tachydysrhythmia induced.                     We then performed an EP study and programmed stimulation using our CS catheter and ablation catheter to assess the cardiac conduction system and to attempt to induce atrial tachydysrhythmia. The ablation catheter were moved from the left atrium to the left ventricular apex and His bundle position. His bundle potentials was recorded and pacing and recordings were performed from right atrium, coronary sinus and LV apex with the following results:     Sinus cycle length was 650 msec  IN interval was 155 msec  QRS duration was 67 msec  QT interval was 369 msec  AH interval was 80 msec  HV interval was 35 msec  Pacing from left atrium, 1:1 conduction over AV node with (AV wenckebach) was 570  msec  Pacing from left atrium, AV wagner ERP was 500/260 msec   Pacing from LV apex, 1:1 retrograde conduction over AV node (VA wenckebach) was 270 msec  Pacing from LV apex, showed VAERP of 500/200 msec. Then both the ablation, Pentarray, and CS catheters were removed from the body, and all 3 sheaths were removed from the right femoral vein with a figure-of-eight suture that was placed to provide hemostasis while awaiting the downtrending of the ACT. Protamine 150mg IV x 1 was administered to partially reverse the IV heparin that was administered during the atrial fibrillation ablation procedure. Of note, the patient manifested red rash upon administering Protamine for which Benadryl 50mg IV x 1 was administered which resolved the rash. Under intracardiac ultrasound guidance, we evaluated for pericardial effusion, and there was no evidence of such. Specimen collected: none    Estimated blood loss: < 50 cc    The patient tolerated the procedure well and there were no complications. Patient was extubated and transferred to the floor in stable condition.      Conclusion:     - Pre- and post-procedure diagnoses were paroxysmal atrial fibrillation and roof-dependent left atrial flutter with CL 243ms with near proximal-distal activation   - Pulmonary vein isolations using wide area circumferential radiofrequency ablation   - Additional ablation with creation of roof line (for left atrial flutter)  - Ablation of complex fractionated atrial electrogram ablations in the left atrium (for atrial fibrillation)    Plan:   The patient will be monitored and receive the usual post ablation care. If there are no complications, the patient will be discharged from the hospital either later today or tomorrow with a new prescription for Flecainide 25mg po BID, pre-admission Toprol XL 12.5mg po daily, and pre-admission Eliquis 5mg po BID. The patient will follow-up with the EP NP in 3 month's time. Thank you for allowing us to participate in the care of your patient. If you have any questions or concerns, please do not hesitate to contact me.     Kiah Hu MD, MS, [facility]  Cardiac Electrophysiology  [address]  [address] [facility] [address] [place] [address]  [facility]  [phone]

## 2021-09-21 NOTE — H&P
Cardiac Electrophysiology Consultation   Date: 9/21/2021  Reason for Consultation: Atrial fibrillation  Consult Requesting Physician: Carlos Howell DO  Primary Care Physician: Carlos Howell DO     Chief Complaint:        Chief Complaint   Patient presents with    Check-Up       when she has her episodes she will have chest pain.         HPI: Suki Pacheco is a 66 y.o. patient with a history of  syncope, hypotension, pAFIB-new onset 12/16/2016 0400. Converted per cardizem gtt. At that time, she had diarrhea, near syncope, fell. \"woozy and weak feeling. \" CT revealed extensive colitis at that time. She was seen in office on 4/1/2019 to establish care for her atrial fibrillation and medication therapy vs ablation was discussed and she decided to use medication therapy with Flecainide. She presented to office on 2/19/2020 accompanied by her   for follow up for her atrial fibrillation. She reported her blood pressure had been running low reports SBP occasionally in 70's and 80's, most time running high . She reports that she is symptomatic when her blood pressure is low she will feel lightheaded \"woozy\". Ablation was again dicussed as a treatment option for her atrial fibrillation and she decided to deliberate further before making her decision to proceed. She called the office on 2/24/2020 reporting the she was ready to proceed with scheduling. On 8/11/2020 she underwent radiofrequency ablation of atrial fibrillation. She was seen in office on 11/12/2020 by Piedad Lord CNP and EKG showed SR. Flecainide and Toprol XL was stopped and 30 day monitor was placed in order to assess new baseline cardiac rhythm apart from any anti-arrhythmic medications. The monitor was worn from 11/13/2020-12/12/2020 and showed SR with some PVC monomorphic. She underwent implantation of [employer] loop recorder on 7/13/2021. Device interrogation on 7/22/2021 showed return of atrial fibrillation.    Interval History: Today, she present to office accompanied by her  to discuss treatment options for her afib. She reports that she had some issues with pain in her leg s/p ablation. EKG today shows SR. Patient denies exertional chest pain/pressure, dyspnea at rest, CORONA, PND, orthopnea, palpitations, lightheadedness, weight changes, changes in LE edema, and syncope.     Past Medical History        Past Medical History:   Diagnosis Date    Acid reflux      Atrial fibrillation [facility])              Past Surgical History         Past Surgical History:   Procedure Laterality Date    COLONOSCOPY   11/04/2014    ENDOSCOPY, COLON, DIAGNOSTIC   11/04/2014     with biopsy    TOOTH EXTRACTION   1967     [address], Dr.            Allergies:  No Known Allergies     Medication:   Home Medications           Prior to Admission medications    Medication Sig Start Date End Date Taking? Authorizing Provider   metoprolol succinate (TOPROL XL) 25 MG extended release tablet Take 0.5 tablets by mouth daily as needed (palpitations) 4/28/21   Yes BRUNILDA Love - CNP   famotidine (PEPCID) 20 MG tablet Take 20 mg by mouth 2 times daily     Yes Historical Provider, MD   apixaban (ELIQUIS) 5 MG TABS tablet Take 1 tablet by mouth 2 times daily 2/19/20   Yes Maldonado Le MD   Calcium-Magnesium-Vitamin D (CALCIUM 500 PO) Take 500 mg by mouth daily     Yes Historical Provider, MD   calcitonin, salmon, (MIACALCIN) 200 UNIT/ACT nasal spray 1 spray by Nasal route daily.     Yes Historical Provider, MD            Social History:   reports that she has never smoked. She has never used smokeless tobacco. She reports that she does not drink alcohol and does not use drugs.         Family History:  family history includes Heart Disease in her brother and father; Other in her father and mother. Reviewed. Denies family history of sudden cardiac death, arrhythmia, premature CAD     Review of System:     · General ROS: negative for - chills, fever   · Psychological ROS: negative for - anxiety or depression  · Ophthalmic ROS: negative for - eye pain or loss of vision  · ENT ROS: negative for - epistaxis, headaches, nasal discharge, sore throat   · Allergy and Immunology ROS: negative for - hives, nasal congestion   · Hematological and Lymphatic ROS: negative for - bleeding problems, blood clots, bruising or jaundice  · Endocrine ROS: negative for - skin changes, temperature intolerance or unexpected weight changes  · Respiratory ROS: negative for - cough, hemoptysis, pleuritic pain, SOB, sputum changes or wheezing  · Cardiovascular ROS: Per HPI. · Gastrointestinal ROS: negative for - abdominal pain, blood in stools, diarrhea, hematemesis, melena, nausea/vomiting or swallowing difficulty/pain  · Genito-Urinary ROS: negative for - dysuria or incontinence  · Musculoskeletal ROS: negative for - joint swelling or muscle pain  · Neurological ROS: negative for - confusion, dizziness, gait disturbance, headaches, numbness/tingling, seizures, speech problems, tremors, visual changes or weakness  · Dermatological ROS: negative for - rash     Physical Examination:      Vitals:     08/09/21 1617   BP: 118/68   Pulse: 79   SpO2: 99%         · Constitutional: Oriented. No distress. · Head: Normocephalic and atraumatic. · Mouth/Throat: Oropharynx is clear and moist.   · Eyes: Conjunctivae normal. EOM are normal.   · Neck: Normal range of motion. Neck supple. No rigidity. No JVD present. · Cardiovascular: Normal rate, regular rhythm, S1&S2 and intact distal pulses. · Pulmonary/Chest: Bilateral respiratory sounds. No wheezes. No rhonchi. · Abdominal: Soft. Bowel sounds present. No distension, No tenderness. · Musculoskeletal: No tenderness. No edema    · Lymphadenopathy: Has no cervical adenopathy. sustained to the extent that many individuals would then develop persistent left atrial flutter. Once persistence is reached, permanent left atrial flutter is inevitable. We also discussed the fact that left atrial flutter is associated with stroke, including life-threatening stroke, and therefore oral anticoagulation is warranted depending on the patient's [id number] score.     We discussed different management options for left atrial flutter including their risks and benefits. These options include use of cardioversion which provides an effective immediate therapy with success rates of 90% or higher, but it provides no short nor long term efficacy. Anti-arrhythmic medications has been very difficult to control left atrial flutter, both in regards to heart rate and rhythm control even with a powerful anti-arrhythmic medication (amiodarone). Left atrial flutter ablation is a potentially curative therapy with very reasonable success rate.      The risks, benefits and alternatives of the left atrial flutter ablation procedure were discussed with the patient. The risks including, but not limited to, bleeding, infection, radiation exposure, injury to vascular, cardiac and surrounding structures (including pneumothorax), stroke, cardiac perforation, tamponade, need for emergent open heart surgery, need for pacemaker implantation, injury to the phrenic nerve, injury to the esophagus, myocardial infarction and death were discussed in detail. The patient was also counseled at length about the risks of willie Covid-19 in the zachary-operative and post-operative states including the recovery window of their procedure. The patient was made aware that willie Covid-19 after a surgical procedure may worsen their prognosis for recovering from the virus and lend to a higher morbidity and or mortality risk. The patient was given the option of postponing their procedure.  The patient was also presented reasonable alternatives to the proposed care, treatment, and services. The discussion I have had with the patient encompassed risks, benefits, and side effects related to the alternatives and the risks related to not receiving the proposed care, treatment and services.      I spent 40 minutes face to face with the patient, with greater than 50% of that time spent in counseling on the above.     The patient opted to proceed with the left atrial flutter ablation. We will schedule for a radiofrequency ablation with [employer] Navigation system with a GIANNA procedure immediately prior to this ablation. We will hold Eliquisfor 12 hours prior to procedure. We will order BMP, CBC, PT/INR, and Type & Screen prior to the procedure.      Hypertension, essential   Stable     Follow up three months after the procedure with Reina Suazo CNP.     Thank you for allowing me to participate in the care of August Dress. All questions and concerns were addressed to the patient/family. Alternatives to my treatment were discussed.      I have reviewed the history and physical and examined the patient and find no relevant changes. I have reviewed with the patient and/or family the risks and benefits to the proposed procedure. The patient was presented with the option of postponing the proposed procedure. The patient was also presented reasonable alternatives to the proposed care, treatment, and services.  The discussion I have had with the patient encompassed risks, benefits, and side effects related to the alternatives and the risks related to not receiving the proposed care, treatment and services.      Rafael Rowland MD, MS, [place] [address] Electrophysiology  [address], [address] [place] [address]  Collin Hays [address]  [phone]:

## 2021-09-21 NOTE — ANESTHESIA POSTPROCEDURE EVALUATION
Department of Anesthesiology  Postprocedure Note    Patient: Agustin Stovall  MRN: [id number]  YOB: 1950  Date of evaluation: 9/21/2021  Time:  12:44 PM     Procedure Summary     Date: 09/21/21 Room / Location: [facility] Cath Lab    Anesthesia Start: 6431 Anesthesia Stop: 7034    Procedure: ECHOCARDIOGRAM TRANSESOPHAGEAL Diagnosis:       Paroxysmal atrial fibrillation (HCC)      Left atrial flutter by electrocardiogram ([facility])      (To check for clots in the TIM prior to ablation.)    Scheduled Providers:  Responsible Provider: Betsy Bailon MD    Anesthesia Type: General ASA Status: 3          Anesthesia Type: General    Evangelina Phase I: Evangelina Score: 10    Evangelina Phase II:      Last vitals: Reviewed and per EMR flowsheets.        Anesthesia Post Evaluation    Patient location during evaluation: PACU  Patient participation: complete - patient participated  Level of consciousness: awake and alert  Pain score: 0  Airway patency: patent  Nausea & Vomiting: no nausea and no vomiting  Complications: no  Cardiovascular status: blood pressure returned to baseline  Respiratory status: acceptable  Hydration status: euvolemic

## 2021-09-21 NOTE — PROGRESS NOTES
Pt a little drowsy but awakened & asking for her teeth. VSS on RA. Rt groin site c/d/i. Transfer back to pre/post cath lab.

## 2021-09-30 ENCOUNTER — NURSE ONLY (OUTPATIENT)
Dept: CARDIOLOGY CLINIC | Age: 71
End: 2021-09-30
Payer: MEDICARE

## 2021-09-30 DIAGNOSIS — Z86.79 S/P ABLATION OF ATRIAL FIBRILLATION: ICD-10-CM

## 2021-09-30 DIAGNOSIS — I48.0 PAF (PAROXYSMAL ATRIAL FIBRILLATION) (HCC): ICD-10-CM

## 2021-09-30 DIAGNOSIS — Z45.09 ENCOUNTER FOR ELECTRONIC ANALYSIS OF REVEAL EVENT RECORDER: Chronic | ICD-10-CM

## 2021-09-30 DIAGNOSIS — Z98.890 S/P ABLATION OF ATRIAL FIBRILLATION: ICD-10-CM

## 2021-09-30 PROCEDURE — G2066 INTER DEVC REMOTE 30D: HCPCS | Performed by: INTERNAL MEDICINE

## 2021-09-30 PROCEDURE — 93298 REM INTERROG DEV EVAL SCRMS: CPT | Performed by: INTERNAL MEDICINE

## 2021-09-30 NOTE — PROGRESS NOTES
ILR for palpitations/suspected AF (h/o atrial fibrillation s/p ablation by Dr. Aparna Rich the recent 1-2 years).      We received a remote transmission from patient's monitor at home. Recent AF/AFL ABL 09.21.21/WSW. Since 08.25.2021:  18 Symptom, 57 Tachy, 5 AF (0.4% burden) w available ECGs illustrating AT/AF/AFL w RVR, most recent 09.30.21. 3 pause events correlating w ABL procedure 09.21.21. Pt on Toprol XL, flecainide, and Eliquis.      EP physician to review. We will continue to monitor remotely.

## 2021-10-15 ENCOUNTER — TELEPHONE (OUTPATIENT)
Dept: CARDIOLOGY CLINIC | Age: 71
End: 2021-10-15

## 2021-10-15 NOTE — TELEPHONE ENCOUNTER
Delroy Waller is calling in wanting to notify [facility]. Rep, that she will be going out of town 10/21-11/7. She will be taking her transmission with her, but states the booklet tells her to call to notify due to some areas not having cellular service. Any questions, Delroy Waller can be reached at [phone].

## 2021-11-04 ENCOUNTER — NURSE ONLY (OUTPATIENT)
Dept: CARDIOLOGY CLINIC | Age: 71
End: 2021-11-04
Payer: MEDICARE

## 2021-11-04 DIAGNOSIS — Z86.79 S/P ABLATION OF ATRIAL FIBRILLATION: ICD-10-CM

## 2021-11-04 DIAGNOSIS — Z45.09 ENCOUNTER FOR ELECTRONIC ANALYSIS OF REVEAL EVENT RECORDER: Chronic | ICD-10-CM

## 2021-11-04 DIAGNOSIS — I48.0 PAF (PAROXYSMAL ATRIAL FIBRILLATION) (HCC): ICD-10-CM

## 2021-11-04 DIAGNOSIS — Z98.890 S/P ABLATION OF ATRIAL FIBRILLATION: ICD-10-CM

## 2021-11-04 PROCEDURE — G2066 INTER DEVC REMOTE 30D: HCPCS | Performed by: INTERNAL MEDICINE

## 2021-11-04 PROCEDURE — 93298 REM INTERROG DEV EVAL SCRMS: CPT | Performed by: INTERNAL MEDICINE

## 2021-11-05 NOTE — PROGRESS NOTES
ILR for palpitations/suspected AF (h/o atrial fibrillation s/p ablation by Dr. Arvind Black the recent 1-2 years, most recent AF/L ABL 09.21.21). We received a remote transmission from patient's monitor at home. Since 09.29.2021:  10 Symptom, 1 Tachy, 3 AF (0.1% burden) w available ECGs illustrating AT/AF/AFL w RVR at times as well as some NSR w ectopy. Pt on Toprol XL, flecainide, and Eliquis.      EP physician to review. We will continue to monitor remotely. Implemented All Universal Safety Interventions:  [place] to call system. Call bell, personal items and telephone within reach. Instruct patient to call for assistance. Room bathroom lighting operational. Non-slip footwear when patient is off stretcher. Physically safe environment: no spills, clutter or unnecessary equipment. Stretcher in lowest position, wheels locked, appropriate side rails in place.

## 2021-12-09 ENCOUNTER — TELEPHONE (OUTPATIENT)
Dept: CARDIOLOGY CLINIC | Age: 71
End: 2021-12-09

## 2021-12-09 ENCOUNTER — NURSE ONLY (OUTPATIENT)
Dept: CARDIOLOGY CLINIC | Age: 71
End: 2021-12-09
Payer: MEDICARE

## 2021-12-09 DIAGNOSIS — I48.92 LEFT ATRIAL FLUTTER BY ELECTROCARDIOGRAM (HCC): ICD-10-CM

## 2021-12-09 DIAGNOSIS — I48.0 PAF (PAROXYSMAL ATRIAL FIBRILLATION) (HCC): ICD-10-CM

## 2021-12-09 DIAGNOSIS — Z98.890 S/P ABLATION OF ATRIAL FIBRILLATION: ICD-10-CM

## 2021-12-09 DIAGNOSIS — Z86.79 S/P ABLATION OF ATRIAL FIBRILLATION: ICD-10-CM

## 2021-12-09 DIAGNOSIS — Z45.09 ENCOUNTER FOR ELECTRONIC ANALYSIS OF REVEAL EVENT RECORDER: Chronic | ICD-10-CM

## 2021-12-09 PROCEDURE — 93298 REM INTERROG DEV EVAL SCRMS: CPT | Performed by: INTERNAL MEDICINE

## 2021-12-09 PROCEDURE — G2066 INTER DEVC REMOTE 30D: HCPCS | Performed by: INTERNAL MEDICINE

## 2021-12-09 RX ORDER — METOPROLOL SUCCINATE 25 MG/1
12.5 TABLET, EXTENDED RELEASE ORAL DAILY
Qty: 15 TABLET | Refills: 5 | Status: SHIPPED | OUTPATIENT
Start: 2021-12-09 | End: 2021-12-13 | Stop reason: SDUPTHER

## 2021-12-09 NOTE — TELEPHONE ENCOUNTER
Last OV: 08/09/2021  Next OV: 12/21/2021  Last refill:  Most recent Labs: bmp, cbc 09/16/2021  Last EKG (if needed):09/21/2021

## 2021-12-09 NOTE — TELEPHONE ENCOUNTER
Medication Refill    Medication needing refilled:  metoprolol succinate (TOPROL XL)      Dosage of the medication:  25 MG extended release tablet       How are you taking this medication (QD, BID, TID, QID, PRN):  Take 0.5 tablets by mouth daily as needed (palpitations)    30 or 90 day supply called in:30      Which Pharmacy are we sending the medication to?:    R[facility]  [address] [facility],[address], [address] [place] [address]   Phone:  [phone]  Fax:  [phone]

## 2021-12-09 NOTE — PROGRESS NOTES
ILR for palpitations/suspected AF (h/o atrial fibrillation s/p ablation by Dr. Sisi Batista the recent 1-2 years, most recent AF/L ABL 09.21.21). We received a remote transmission from patient's monitor at home. Since 11.03.2021: 10 Symptom, 6 Tachy w available ECGs illustrating AT/AF/AFL w RVR as well as some NSR w ectopy. Pt on Toprol XL, flecainide, and Eliquis. Pt has f/u appt w [facility]/device 12.21.21.     EP physician to review. We will continue to monitor remotely.

## 2021-12-13 RX ORDER — METOPROLOL SUCCINATE 25 MG/1
12.5 TABLET, EXTENDED RELEASE ORAL 2 TIMES DAILY
Qty: 30 TABLET | Refills: 5 | Status: SHIPPED | OUTPATIENT
Start: 2021-12-13 | End: 2021-12-21 | Stop reason: ALTCHOICE

## 2021-12-13 NOTE — TELEPHONE ENCOUNTER
Pt is calling in stating that the insurance will not cover her refill because they state that it is too soon but she will be out before her f/up with [facility] NP. Pt states that she takes 0.5 tablets by mouth twice a day and not once a day.  Will need a refill sent to     [place] [address], [address] [place], [place] [address]   Phone:  [phone]  Fax:  [phone]

## 2021-12-17 NOTE — PROGRESS NOTES
[address]   Electrophysiology      Date: 12/21/2021    Primary cardiologist: Travis Bauman MD    Chief Complaint:   Chief Complaint   Patient presents with    Follow-up     afib- device check - no cardiac complaints     History of Present Illness:    I saw Ulisses Narvaez in the office for electrophysiology follow up today. She is a 66 y.o. female with a past medical history of paroxysmal atrial fibrillation, syncope and hypotension. She underwent EP study, ablation of atrial fibrillation with pulmonary vein isolation, additional ablation with creation of a roof line on 8/11/20 with Dr. Anisha Mcneal. Her flecainide and Toprol were stopped when she was out past her 90 day blanking period and she wore a 30 day event monitor. Monitor showed sinus with some PVCs. She had near syncope and underwent placement of implantable loop recorder on 7/13/21. She had atrial fibrillation seen on ILR interrogation in July 2021. She underwent repeat atrial fibrillation (PVI, CAFE) and left atrial flutter (roof line) ablation on 9/21/21 with Dr. Anisha Mcneal. She was restarted on flecainide 25mg BID. She presents today for procedure follow up. She has been feeling well since her ablation aside from occasional lightheadedness associated with hypotension. Denies any syncope or near syncope. No dyspnea or palpitations. No chest pain. No edema. No bleeding issues. Allergies:  No Known Allergies  Home Medications:  Prior to Visit Medications    Medication Sig Taking? Authorizing Provider   famotidine (PEPCID) 20 MG tablet Take 20 mg by mouth 2 times daily Yes Historical Provider, MD   apixaban (ELIQUIS) 5 MG TABS tablet Take 1 tablet by mouth 2 times daily Yes Ashanti Esposito MD   Calcium-Magnesium-Vitamin D (CALCIUM 500 PO) Take 500 mg by mouth daily Yes Historical Provider, MD   calcitonin, salmon, (MIACALCIN) 200 UNIT/ACT nasal spray 1 spray by Nasal route daily.  Yes Historical Provider, MD        Past Medical History:  Past Medical History:   Diagnosis Date    Acid reflux     Atrial fibrillation [facility])        Past Surgical History:   Past Surgical History:   Procedure Laterality Date    COLONOSCOPY  11/04/2014    ENDOSCOPY, COLON, DIAGNOSTIC  11/04/2014    with biopsy   [address]., Dr, Dr.       Social History:   reports that she has never smoked. She has never used smokeless tobacco. She reports that she does not drink alcohol and does not use drugs. Family History:      Problem Relation Age of Onset    Other Mother     Other Father     Heart Disease Father     Heart Disease Brother        Review of Systems   Constitutional: Negative for chills, fatigue, fever and unexpected weight change. HENT: Negative for congestion, hearing loss, sinus pressure, sore throat and trouble swallowing. Respiratory: Negative for cough, shortness of breath and wheezing. Cardiovascular: Negative for chest pain, palpitations and leg swelling. Gastrointestinal: Negative for abdominal pain, blood in stool, constipation, diarrhea, nausea and vomiting. Genitourinary: Negative for hematuria. Musculoskeletal: Negative for arthralgias, back pain, gait problem and myalgias. Skin: Negative for color change, rash and wound. Neurological: Positive for light-headedness. Negative for dizziness, seizures, syncope, speech difficulty and weakness. Hematological: Does not bruise/bleed easily. Physical Examination:  Vitals:    12/21/21 1407   BP: 100/74   Pulse: 68   SpO2: 97%      Wt Readings from Last 3 Encounters:   12/21/21 121 lb (54.9 kg)   09/21/21 118 lb (53.5 kg)   08/09/21 118 lb (53.5 kg)       Physical Exam  Vitals reviewed. Constitutional:       General: She is not in acute distress. Appearance: Normal appearance. HENT:      Head: Normocephalic and atraumatic.       Nose: Nose normal.      Mouth/Throat:      Mouth: Mucous membranes are moist.   Eyes:      Conjunctiva/sclera: Conjunctivae normal.      Pupils: Pupils are equal, round, and reactive to light. Cardiovascular:      Rate and Rhythm: Normal rate. Heart sounds: No murmur heard. No friction rub. No gallop. Pulmonary:      Effort: No respiratory distress. Breath sounds: No wheezing, rhonchi or rales. Abdominal:      General: Abdomen is flat. Bowel sounds are normal.      Palpations: Abdomen is soft. Musculoskeletal:         General: Normal range of motion. Right lower leg: No edema. Left lower leg: No edema. Skin:     General: Skin is warm and dry. Findings: No bruising. Neurological:      General: No focal deficit present. Mental Status: She is alert and oriented to person, place, and time. Motor: No weakness. Psychiatric:         Mood and Affect: Mood normal.         Behavior: Behavior normal.          Pertinent labs, diagnostic, device, and imaging results reviewed as a part of this visit    LABS    CBC:   Lab Results   Component Value Date    WBC 5.3 2021    HGB 12.6 2021    HCT 37.8 2021    MCV 89.0 2021     2021     BMP:   Lab Results   Component Value Date    CREATININE 0.9 2021    BUN 18 2021     2021    K 4.9 2021     2021    CO2 24 2021     Estimated Creatinine Clearance: 49 mL/min (based on SCr of 0.9 mg/dL).    No results found for: BNP    Thyroid:   Lab Results   Component Value Date    TSH 2.44 2021     Lipid Panel: No results found for: CHOL, HDL, TRIG  LFTs:  Lab Results   Component Value Date    ALT 16 2021    AST 20 2021    ALKPHOS 85 2021    BILITOT <0.2 2021     Coags:   Lab Results   Component Value Date    PROTIME 13.4 (H) 2021    INR 1.15 (H) 2021    APTT 37.6 (H) 2021       EC2021   SR at 67 BPM.    Echo:12/16/16   Conclusions      Summary   Left ventricle size is normal.   Normal left ventricular wall thickness.   Ejection fraction is visually estimated to be 60%.   Normal right ventricular size and function.   No significant valvular disease.     Stress: 2/7/17  Conclusions          Summary     Normal myocardial perfusion study.     Normal LV size and systolic function.  ECG portion of stress test is normal.     Alford score equals 4.     Overall findings represent a low risk scan.       Cardiac event monitor: 2/24/17  Baseline SR, A fib with RVR which was symptomatic.      Procedures:  1. Electrophysiology study with radiofrequency ablation of atrial fibrillation and pulmonary vein isolation, additional ablation with creation of a roof line - 8/11/20 Dr. Mady Houston  2. ILR implant, 7/13/21, Dr. Mady Houston  3. Atrial fibrillation (PVI, CAFE) and L atrial flutter (roof line) ablation, 9/21/21, Dr. Lucrecia Waller and Plan:     Paroxysmal atrial fibrillation            - s/p PVI ablation, roof line ablation on 8/11/20 with Dr. Tricia Higuera 2 (age and gender), on Eliquis 5mg BID   - noted recurrence on ILR interrogation in July 2021   - s/p atrial fibrillation ablation 9/21/21   - stop flecainide and Toprol   - ILR interrogation in 1 month                  Left atrial flutter   - seen on EP study s/p roof line ablation 9/21/21   - see above        Thank you for allowing to us to participate in the care of Mookie Shah. Follow up with BRUNILDA Dupree in 1 month.     BRUNILDA Dupree  [facility] [address]  Phone: [phone]  Fax: [phone]    Electronically signed by BRUNILDA Mccollum - CNP on 12/21/2021 at 2:36 PM

## 2021-12-21 ENCOUNTER — OFFICE VISIT (OUTPATIENT)
Dept: CARDIOLOGY CLINIC | Age: 71
End: 2021-12-21
Payer: MEDICARE

## 2021-12-21 ENCOUNTER — NURSE ONLY (OUTPATIENT)
Dept: CARDIOLOGY CLINIC | Age: 71
End: 2021-12-21

## 2021-12-21 VITALS
WEIGHT: 121 LBS | BODY MASS INDEX: 20.66 KG/M2 | HEIGHT: 64 IN | HEART RATE: 68 BPM | DIASTOLIC BLOOD PRESSURE: 74 MMHG | SYSTOLIC BLOOD PRESSURE: 100 MMHG | OXYGEN SATURATION: 97 %

## 2021-12-21 DIAGNOSIS — Z45.09 ENCOUNTER FOR ELECTRONIC ANALYSIS OF REVEAL EVENT RECORDER: ICD-10-CM

## 2021-12-21 DIAGNOSIS — Z86.79 S/P ABLATION OF ATRIAL FIBRILLATION: ICD-10-CM

## 2021-12-21 DIAGNOSIS — I48.0 PAROXYSMAL ATRIAL FIBRILLATION (HCC): ICD-10-CM

## 2021-12-21 DIAGNOSIS — R55 NEAR SYNCOPE: ICD-10-CM

## 2021-12-21 DIAGNOSIS — I48.92 LEFT ATRIAL FLUTTER BY ELECTROCARDIOGRAM (HCC): ICD-10-CM

## 2021-12-21 DIAGNOSIS — Z98.890 S/P ABLATION OF ATRIAL FIBRILLATION: ICD-10-CM

## 2021-12-21 DIAGNOSIS — I48.0 PAF (PAROXYSMAL ATRIAL FIBRILLATION) (HCC): Primary | ICD-10-CM

## 2021-12-21 DIAGNOSIS — R42 DIZZINESS: ICD-10-CM

## 2021-12-21 PROCEDURE — 1123F ACP DISCUSS/DSCN MKR DOCD: CPT | Performed by: NURSE PRACTITIONER

## 2021-12-21 PROCEDURE — 4040F PNEUMOC VAC/ADMIN/RCVD: CPT | Performed by: NURSE PRACTITIONER

## 2021-12-21 PROCEDURE — 1090F PRES/ABSN URINE INCON ASSESS: CPT | Performed by: NURSE PRACTITIONER

## 2021-12-21 PROCEDURE — G8484 FLU IMMUNIZE NO ADMIN: HCPCS | Performed by: NURSE PRACTITIONER

## 2021-12-21 PROCEDURE — 1036F TOBACCO NON-USER: CPT | Performed by: NURSE PRACTITIONER

## 2021-12-21 PROCEDURE — 3017F COLORECTAL CA SCREEN DOC REV: CPT | Performed by: NURSE PRACTITIONER

## 2021-12-21 PROCEDURE — G8399 PT W/DXA RESULTS DOCUMENT: HCPCS | Performed by: NURSE PRACTITIONER

## 2021-12-21 PROCEDURE — G8427 DOCREV CUR MEDS BY ELIG CLIN: HCPCS | Performed by: NURSE PRACTITIONER

## 2021-12-21 PROCEDURE — 99214 OFFICE O/P EST MOD 30 MIN: CPT | Performed by: NURSE PRACTITIONER

## 2021-12-21 PROCEDURE — G8420 CALC BMI NORM PARAMETERS: HCPCS | Performed by: NURSE PRACTITIONER

## 2021-12-21 PROCEDURE — 93000 ELECTROCARDIOGRAM COMPLETE: CPT | Performed by: NURSE PRACTITIONER

## 2021-12-21 ASSESSMENT — ENCOUNTER SYMPTOMS
CONSTIPATION: 0
DIARRHEA: 0
WHEEZING: 0
COLOR CHANGE: 0
SHORTNESS OF BREATH: 0
COUGH: 0
NAUSEA: 0
TROUBLE SWALLOWING: 0
BACK PAIN: 0
VOMITING: 0
SORE THROAT: 0
BLOOD IN STOOL: 0
SINUS PRESSURE: 0
ABDOMINAL PAIN: 0

## 2021-12-21 NOTE — PROGRESS NOTES
Pt seen in clinic today for cardiac device interrogation 3mo post ablation  Their device is a MDT ILR   the device appears to be functioning normally. Remaining battery life is \"OK\"    last remote check 12/9/2021 - no episodes since then. Pt was informed of findings today and general questions have been answered with regard to device. Home monitoring hardware is transmitting. Results discussed with or to be reviewed by EP    Pt to see JAELYN Mariee today in clinic.

## 2022-01-13 ENCOUNTER — NURSE ONLY (OUTPATIENT)
Dept: CARDIOLOGY CLINIC | Age: 72
End: 2022-01-13
Payer: MEDICARE

## 2022-01-13 DIAGNOSIS — Z45.09 ENCOUNTER FOR ELECTRONIC ANALYSIS OF REVEAL EVENT RECORDER: Chronic | ICD-10-CM

## 2022-01-13 DIAGNOSIS — I48.0 PAF (PAROXYSMAL ATRIAL FIBRILLATION) (HCC): ICD-10-CM

## 2022-01-14 NOTE — PROGRESS NOTES
ILR for palpitations/suspected AF (h/o atrial fibrillation s/p ablation by Dr. Chava Mills the recent 1-2 years, most recent AF/L ABL 09.21.21). We received a remote transmission from patient's ILR monitor at home. Since 12.08.2021:   1 Symptom, 6 Tachy, 9 AF (0.5% burden) w available ECGs illustrating what appears to be AT/AF/AFL w RVR, some possible NSR w ectopy. Pt on EliquEnvision [place]. Pt has f/u appt w NPCP 01.21.22.     EP physician to review. We will continue to monitor remotely.

## 2022-01-15 PROCEDURE — 93298 REM INTERROG DEV EVAL SCRMS: CPT | Performed by: INTERNAL MEDICINE

## 2022-01-15 PROCEDURE — G2066 INTER DEVC REMOTE 30D: HCPCS | Performed by: INTERNAL MEDICINE

## 2022-01-17 NOTE — PROGRESS NOTES
[address]   Electrophysiology      Date: 1/21/2022    Primary cardiologist: Chemo Arroyo MD    Chief Complaint:   Chief Complaint   Patient presents with    Follow-up     afib - device check     History of Present Illness:    I saw Gabriela Sandhu in the office for electrophysiology follow up today. She is a 66 y.o. female with a past medical history of paroxysmal atrial fibrillation, syncope and hypotension. She underwent EP study, ablation of atrial fibrillation with pulmonary vein isolation, additional ablation with creation of a roof line on 8/11/20 with Dr. Mehnaz Iraheta. Her flecainide and Toprol were stopped when she was out past her 90 day blanking period and she wore a 30 day event monitor. Monitor showed sinus with some PVCs. She had near syncope and underwent placement of implantable loop recorder on 7/13/21. She had atrial fibrillation seen on ILR interrogation in July 2021. She underwent repeat atrial fibrillation (PVI, CAFE) and left atrial flutter (roof line) ablation on 9/21/21 with Dr. Mehnaz Iraheta. Flecainide and Toprol were stopped once she was 3 months out from her ablation. She presents today for ILR interrogation after stopping her antiarrhythmic. She has been having some symptoms of palpitations since her last visit but they have been brief. She did get her flu shot on 1/6 and says she felt very poorly for 2 days following this with aches and fatigue. She continues to have periods of hypotension that will resolve on it's own eventually. Denies any syncope. No chest pain. No edema. No bleeding issues. Allergies:  No Known Allergies  Home Medications:  Prior to Visit Medications    Medication Sig Taking?  Authorizing Provider   famotidine (PEPCID) 20 MG tablet Take 20 mg by mouth 2 times daily Yes Historical Provider, MD   apixaban (ELIQUIS) 5 MG TABS tablet Take 1 tablet by mouth 2 times daily Yes Casa Beck MD   Calcium-Magnesium-Vitamin D (CALCIUM 500 PO) Take 500 mg by mouth daily Yes Historical Provider, MD   calcitonin, salmon, (MIACALCIN) 200 UNIT/ACT nasal spray 1 spray by Nasal route daily. Yes Historical Provider, MD        Past Medical History:  Past Medical History:   Diagnosis Date    Acid reflux     Atrial fibrillation [facility])        Past Surgical History:   Past Surgical History:   Procedure Laterality Date    COLONOSCOPY  11/04/2014    ENDOSCOPY, COLON, DIAGNOSTIC  11/04/2014    with biopsy   [address]rell [address]., Dr, Dr.       Social History:   reports that she has never smoked. She has never used smokeless tobacco. She reports that she does not drink alcohol and does not use drugs. Family History:      Problem Relation Age of Onset    Other Mother     Other Father     Heart Disease Father     Heart Disease Brother        Review of Systems   Constitutional: Negative for chills, fatigue, fever and unexpected weight change. HENT: Negative for congestion, hearing loss, sinus pressure, sore throat and trouble swallowing. Respiratory: Negative for cough, shortness of breath and wheezing. Cardiovascular: Positive for palpitations. Negative for chest pain and leg swelling. Gastrointestinal: Negative for abdominal pain, blood in stool, constipation, diarrhea, nausea and vomiting. Genitourinary: Negative for hematuria. Musculoskeletal: Negative for arthralgias, back pain, gait problem and myalgias. Skin: Negative for color change, rash and wound. Neurological: Positive for light-headedness. Negative for dizziness, seizures, syncope, speech difficulty and weakness. Hematological: Does not bruise/bleed easily.         Physical Examination:  Vitals:    01/21/22 0918   BP: 118/64   Pulse: 91   SpO2: 96%      Wt Readings from Last 3 Encounters:   01/21/22 121 lb (54.9 kg)   12/21/21 121 lb (54.9 kg)   09/21/21 118 lb (53.5 kg) Physical Exam  Vitals reviewed. Constitutional:       General: She is not in acute distress. Appearance: Normal appearance. HENT:      Head: Normocephalic and atraumatic. Nose: Nose normal.      Mouth/Throat:      Mouth: Mucous membranes are moist.   Eyes:      Conjunctiva/sclera: Conjunctivae normal.      Pupils: Pupils are equal, round, and reactive to light. Cardiovascular:      Rate and Rhythm: Normal rate and regular rhythm. Heart sounds: No murmur heard. No friction rub. No gallop. Pulmonary:      Effort: No respiratory distress. Breath sounds: No wheezing, rhonchi or rales. Abdominal:      General: Abdomen is flat. Bowel sounds are normal.      Palpations: Abdomen is soft. Musculoskeletal:         General: Normal range of motion. Right lower leg: No edema. Left lower leg: No edema. Skin:     General: Skin is warm and dry. Findings: No bruising. Neurological:      General: No focal deficit present. Mental Status: She is alert and oriented to person, place, and time. Motor: No weakness. Psychiatric:         Mood and Affect: Mood normal.         Behavior: Behavior normal.          Pertinent labs, diagnostic, device, and imaging results reviewed as a part of this visit    LABS    CBC:   Lab Results   Component Value Date    WBC 5.3 09/16/2021    HGB 12.6 09/16/2021    HCT 37.8 09/16/2021    MCV 89.0 09/16/2021     09/16/2021     BMP:   Lab Results   Component Value Date    CREATININE 0.9 09/16/2021    BUN 18 09/16/2021     09/16/2021    K 4.9 09/16/2021     09/16/2021    CO2 24 09/16/2021     CrCl cannot be calculated (Patient's most recent lab result is older than the maximum 120 days allowed. ).    No results found for: BNP    Thyroid:   Lab Results   Component Value Date    TSH 2.44 07/12/2021     Lipid Panel: No results found for: CHOL, HDL, TRIG  LFTs:  Lab Results   Component Value Date    ALT 16 07/12/2021    AST 20 2021    ALKPHOS 85 2021    BILITOT <0.2 2021     Coags:   Lab Results   Component Value Date    PROTIME 13.4 (H) 2021    INR 1.15 (H) 2021    APTT 37.6 (H) 2021       EC2022   SR at 80 BPM.    Echo:16   Conclusions      Summary   Left ventricle size is normal.   Normal left ventricular wall thickness.   Ejection fraction is visually estimated to be 60%.   Normal right ventricular size and function.   No significant valvular disease.     Stress: 17  Conclusions          Summary     Normal myocardial perfusion study.     Normal LV size and systolic function.  ECG portion of stress test is normal.     Alford score equals 4.     Overall findings represent a low risk scan.       Cardiac event monitor: 17  Baseline SR, A fib with RVR which was symptomatic.      Procedures:  1. Electrophysiology study with radiofrequency ablation of atrial fibrillation and pulmonary vein isolation, additional ablation with creation of a roof line - 20 Dr. Linda Bills  2. ILR implant, 21, Dr. Linda Bills  3. Atrial fibrillation (PVI, CAFE) and L atrial flutter (roof line) ablation, 21, Dr. Lorena Lopez interrogation: 2022   Battery life \"Good\"  Possible atrial flutter on 22 for 48 minutes. Two episodes of sustained atrial fibrillation on  and , longest was 40 minutes. One symptom episode with possible atrial run, the strip ends before the arrhythmia does. Assessment and Plan:     Paroxysmal atrial fibrillation            - s/p PVI ablation, roof line ablation on 20 with Dr. Mirtha Slade 2 (age and gender), on Eliquis 5mg BID   - noted recurrence on ILR interrogation in 2021   - s/p atrial fibrillation ablation 21   - ILR interrogation today with episodes of atrial fibrillation and possible flutter on  - discussed with pt and she thinks these are related to her flu shot since she had a bad reaction, I told her that was unlikely. Discussed options including monitoring for a few weeks to see if episodes decrease or stop versus resuming medications like flecainide and Toprol versus repeat ablation. She is wanting to avoid medications if possible as they tend to worsen her hypotension. She is wanting to monitor for a few more weeks while off medications and then follow up with Dr. Chidi Tiwari to discuss further steps                  Left atrial flutter   - seen on EP study s/p roof line ablation 9/21/21   - see above        Thank you for allowing to us to participate in the care of Zackery Gitelman. Follow up with Dr. Chidi Tiwari on 6 weeks.     [facility] -  [facility], BRUNILDA  [facility], [address] [place] [address]  Phone: [phone]  Fax: [phone]    Electronically signed by BRUNILDA Rodriguez - CNP on 1/21/2022 at 9:50 AM

## 2022-01-21 ENCOUNTER — NURSE ONLY (OUTPATIENT)
Dept: CARDIOLOGY CLINIC | Age: 72
End: 2022-01-21

## 2022-01-21 ENCOUNTER — OFFICE VISIT (OUTPATIENT)
Dept: CARDIOLOGY CLINIC | Age: 72
End: 2022-01-21
Payer: MEDICARE

## 2022-01-21 VITALS
DIASTOLIC BLOOD PRESSURE: 64 MMHG | HEIGHT: 64 IN | HEART RATE: 91 BPM | BODY MASS INDEX: 20.66 KG/M2 | OXYGEN SATURATION: 96 % | SYSTOLIC BLOOD PRESSURE: 118 MMHG | WEIGHT: 121 LBS

## 2022-01-21 DIAGNOSIS — Z45.09 ENCOUNTER FOR ELECTRONIC ANALYSIS OF REVEAL EVENT RECORDER: ICD-10-CM

## 2022-01-21 DIAGNOSIS — I48.92 LEFT ATRIAL FLUTTER BY ELECTROCARDIOGRAM (HCC): ICD-10-CM

## 2022-01-21 DIAGNOSIS — R42 DIZZINESS: ICD-10-CM

## 2022-01-21 DIAGNOSIS — R55 NEAR SYNCOPE: ICD-10-CM

## 2022-01-21 DIAGNOSIS — I48.0 PAROXYSMAL ATRIAL FIBRILLATION (HCC): ICD-10-CM

## 2022-01-21 DIAGNOSIS — Z86.79 S/P ABLATION OF ATRIAL FIBRILLATION: ICD-10-CM

## 2022-01-21 DIAGNOSIS — Z98.890 S/P ABLATION OF ATRIAL FIBRILLATION: ICD-10-CM

## 2022-01-21 DIAGNOSIS — I48.0 PAF (PAROXYSMAL ATRIAL FIBRILLATION) (HCC): Primary | ICD-10-CM

## 2022-01-21 PROCEDURE — 1036F TOBACCO NON-USER: CPT | Performed by: NURSE PRACTITIONER

## 2022-01-21 PROCEDURE — 1123F ACP DISCUSS/DSCN MKR DOCD: CPT | Performed by: NURSE PRACTITIONER

## 2022-01-21 PROCEDURE — G8399 PT W/DXA RESULTS DOCUMENT: HCPCS | Performed by: NURSE PRACTITIONER

## 2022-01-21 PROCEDURE — G8420 CALC BMI NORM PARAMETERS: HCPCS | Performed by: NURSE PRACTITIONER

## 2022-01-21 PROCEDURE — G8484 FLU IMMUNIZE NO ADMIN: HCPCS | Performed by: NURSE PRACTITIONER

## 2022-01-21 PROCEDURE — 1090F PRES/ABSN URINE INCON ASSESS: CPT | Performed by: NURSE PRACTITIONER

## 2022-01-21 PROCEDURE — 93000 ELECTROCARDIOGRAM COMPLETE: CPT | Performed by: NURSE PRACTITIONER

## 2022-01-21 PROCEDURE — 99214 OFFICE O/P EST MOD 30 MIN: CPT | Performed by: NURSE PRACTITIONER

## 2022-01-21 PROCEDURE — G8427 DOCREV CUR MEDS BY ELIG CLIN: HCPCS | Performed by: NURSE PRACTITIONER

## 2022-01-21 PROCEDURE — 3017F COLORECTAL CA SCREEN DOC REV: CPT | Performed by: NURSE PRACTITIONER

## 2022-01-21 PROCEDURE — 4040F PNEUMOC VAC/ADMIN/RCVD: CPT | Performed by: NURSE PRACTITIONER

## 2022-01-21 ASSESSMENT — ENCOUNTER SYMPTOMS
ABDOMINAL PAIN: 0
SINUS PRESSURE: 0
TROUBLE SWALLOWING: 0
BACK PAIN: 0
SHORTNESS OF BREATH: 0
WHEEZING: 0
SORE THROAT: 0
COUGH: 0
CONSTIPATION: 0
COLOR CHANGE: 0
BLOOD IN STOOL: 0
VOMITING: 0
DIARRHEA: 0
NAUSEA: 0

## 2022-01-21 NOTE — RESULT ENCOUNTER NOTE
Device interrogation reviewed. At least 3 episodes of atrial fibrillation/left atrial flutter, longest lasting 18 minutes. Please arrange clinic follow-up to discuss management of such, including ablation.

## 2022-01-21 NOTE — PROGRESS NOTES
Pt seen in clinic today for cardiac device interrogation. Their device is a MDT ILR   the device appears to be functioning normally. Remaining battery life is \"OK\"    last remote check 1/13/2022 - 1 SX episode since then - appears to be tachy w/ectopy    Pt was informed of findings today and general questions have been answered with regard to device. Home monitoring hardware is transmitting. Results discussed with or to be reviewed by EP    Pt to see JAELYN Mariee today in clinic.

## 2022-02-24 ENCOUNTER — NURSE ONLY (OUTPATIENT)
Dept: CARDIOLOGY CLINIC | Age: 72
End: 2022-02-24
Payer: MEDICARE

## 2022-02-24 DIAGNOSIS — Z98.890 S/P ABLATION OF ATRIAL FIBRILLATION: ICD-10-CM

## 2022-02-24 DIAGNOSIS — I48.92 LEFT ATRIAL FLUTTER BY ELECTROCARDIOGRAM (HCC): ICD-10-CM

## 2022-02-24 DIAGNOSIS — I48.0 PAF (PAROXYSMAL ATRIAL FIBRILLATION) (HCC): ICD-10-CM

## 2022-02-24 DIAGNOSIS — Z45.09 ENCOUNTER FOR ELECTRONIC ANALYSIS OF REVEAL EVENT RECORDER: Chronic | ICD-10-CM

## 2022-02-24 DIAGNOSIS — Z86.79 S/P ABLATION OF ATRIAL FIBRILLATION: ICD-10-CM

## 2022-02-24 NOTE — PROGRESS NOTES
ILR for palpitations/suspected AF (h/o atrial fibrillation s/p ablation by Dr. Jadon Garcia the recent 1-2 years, most recent AF/L ABL 09.21.21). We received a remote transmission from patient's ILR monitor at home. Since 01.13.22:   12 Symptom, 54 Tachy, 9 AF (0.4% burden) w available ECGs illustrating what appears to be AT/AF/AFL w RVR, as well as some NSR/ST w ectopy. Pt on Eliquis. Pt has f/u appt w device/WSW 03.07.22.     EP physician to review. We will continue to monitor remotely.

## 2022-02-26 PROCEDURE — 93298 REM INTERROG DEV EVAL SCRMS: CPT | Performed by: INTERNAL MEDICINE

## 2022-02-26 PROCEDURE — G2066 INTER DEVC REMOTE 30D: HCPCS | Performed by: INTERNAL MEDICINE

## 2022-03-02 NOTE — PROGRESS NOTES
Cardiac Electrophysiology Consultation   Date: 3/7/2022  Reason for Consultation: Atrial fibrillation  Consult Requesting Physician: Amparo Lawrence DO  Primary Care Physician: Amparo Lawrence DO    Chief Complaint:   Chief Complaint   Patient presents with    Atrial Fibrillation     follow up / device check - episodes of afib on March 1st and 4th         HPI: Pablo Magana is a 66 y.o. patient with a history of  syncope, hypotension, pAFIB-new onset 12/16/2016 0400. Converted per cardizem gtt. At that time, she had diarrhea, near syncope, fell. \"woozy and weak feeling. \" CT revealed extensive colitis at that time. She was seen in office on 4/1/2019 to establish care for her atrial fibrillation and medication therapy vs ablation was discussed and she decided to use medication therapy with Flecainide. She presented to office on 2/19/2020 accompanied by her   for follow up for her atrial fibrillation. She reported her blood pressure had been running low reports SBP occasionally in 70's and 80's, most time running high . She reports that she is symptomatic when her blood pressure is low she will feel lightheaded \"woozy\". Ablation was again dicussed as a treatment option for her atrial fibrillation and she decided to deliberate further before making her decision to proceed. She called the office on 2/24/2020 reporting the she was ready to proceed with scheduling. On 8/11/2020 she underwent radiofrequency ablation of atrial fibrillation. She was seen in office on 11/12/2020 by Raimundo Pelletier, CNP and EKG showed SR. Flecainide and Toprol XL was stopped and 30 day monitor was placed in order to assess new baseline cardiac rhythm apart from any anti-arrhythmic medications. The monitor was worn from 11/13/2020-12/12/2020 and showed SR with some PVC monomorphic. She underwent implantation of [employer] loop recorder on 7/13/2021.  Device interrogation on 7/22/2021 showed return of atrial MG TABS tablet Take 1 tablet by mouth 2 times daily 2/19/20  Yes Sonya Vicente MD   Calcium-Magnesium-Vitamin D (CALCIUM 500 PO) Take 500 mg by mouth daily   Yes Historical Provider, MD   calcitonin, salmon, (MIACALCIN) 200 UNIT/ACT nasal spray 1 spray by Nasal route daily. Yes Historical Provider, MD       Social History:   reports that she has never smoked. She has never used smokeless tobacco. She reports that she does not drink alcohol and does not use drugs. Family History:  family history includes Heart Disease in her brother and father; Other in her father and mother. Reviewed. Denies family history of sudden cardiac death, arrhythmia, premature CAD    Review of System:    · General ROS: negative for - chills, fever   · Psychological ROS: negative for - anxiety or depression  · Ophthalmic ROS: negative for - eye pain or loss of vision  · ENT ROS: negative for - epistaxis, headaches, nasal discharge, sore throat   · Allergy and Immunology ROS: negative for - hives, nasal congestion   · Hematological and Lymphatic ROS: negative for - bleeding problems, blood clots, bruising or jaundice  · Endocrine ROS: negative for - skin changes, temperature intolerance or unexpected weight changes  · Respiratory ROS: negative for - cough, hemoptysis, pleuritic pain, SOB, sputum changes or wheezing  · Cardiovascular ROS: Per HPI.    · Gastrointestinal ROS: negative for - abdominal pain, blood in stools, diarrhea, hematemesis, melena, nausea/vomiting or swallowing difficulty/pain  · Genito-Urinary ROS: negative for - dysuria or incontinence  · Musculoskeletal ROS: negative for - joint swelling or muscle pain  · Neurological ROS: negative for - confusion, dizziness, gait disturbance, headaches, numbness/tingling, seizures, speech problems, tremors, visual changes or weakness  · Dermatological ROS: negative for - rash    Physical Examination:  Vitals:    03/07/22 0846   BP: 102/62   Pulse: 71   SpO2: 97% · Constitutional: Oriented. No distress. · Head: Normocephalic and atraumatic. · Mouth/Throat: Oropharynx is clear and moist.   · Eyes: Conjunctivae normal. EOM are normal.   · Neck: Normal range of motion. Neck supple. No rigidity. No JVD present. · Cardiovascular: Normal rate, regular rhythm, S1&S2 and intact distal pulses. · Pulmonary/Chest: Bilateral respiratory sounds. No wheezes. No rhonchi. · Abdominal: Soft. Bowel sounds present. No distension, No tenderness. · Musculoskeletal: No tenderness. No edema    · Lymphadenopathy: Has no cervical adenopathy. · Neurological: Alert and oriented. Cranial nerve appears intact, No Gross deficit   · Skin: Skin is warm and dry. No rash noted. · Psychiatric: Has a normal mood, affect and behavior     Labs:  Reviewed. ECG:  sinus rhythm with v-rate of 75 bpm with QRS duration 84 ms. No pathologic Q waves, ventricular pre-excitation, or QT prolongation. Studies:   1. Event Monitor 11/13/2020-12/12/2020    SR with some PVC monomorphic. Event monitor: 1/10/17-2/10/17  Baseline SR   AF with RVR, symptoms correlate with RVR     2. Echo: 12/16/16  Summary   Left ventricle size is normal.   Normal left ventricular wall thickness.   Ejection fraction is visually estimated to be 60%.   Normal right ventricular size and function.   No significant valvular disease. 3. Stress Test:  2/7/2017   Summary    Normal myocardial perfusion study.    Normal LV size and systolic function.  ECG portion of stress test is normal.    Alford score equals 4.    Overall findings represent a low risk scan.         4. Cath: none     I independently reviewed the ECG, MCOT, echocardiogram, stress test, and coronary angiography/PCI results and used them for my plan of care. Procedures:  1. Electrophysiology study with radiofrequency ablation of atrial fibrillation 8/11/2020.  2. Loop recorder implantation  7/13/2021.   3.Electrophysiology study with radiofrequency ablation of atrial fibrillation and pulmonary vein isolation & Ablation of roof-dependent left atrial flutter with CL 243ms with near proximal-distal activation 9/21/2021. Assessment/Plan:     Atrial fibrillation, paroxsymal   Left atrial flutter   -s/p ablation of atrial fibrillation on 8/11/2020.  -s/p ablation of atrial fibrillation and left atrial flutter on 9/21/2021.  -She has a [id number] Score 3 (HTN, AGE, female, female gender)  -Continue Eliquis 5 mg BID for  thromboembolic risk reduction.  -Tolerating well no signs or symptoms of abnormal bruising or bleeding.  -Device interrogation today revealed 10 episodes of afib /flutter longest lasting 50 minutes in duration. We educated the patient that left atrial flutter is a worsening and progressive disease, with more frequent episodes that will ensue. Subsequent episodes usually become more sustained to the extent that many individuals would then develop persistent left atrial flutter. Once persistence is reached, permanent left atrial flutter is inevitable. We also discussed the fact that left atrial flutter is associated with stroke, including life-threatening stroke, and therefore oral anticoagulation is warranted depending on the patient's [id number] score. We discussed different management options for left atrial flutter including their risks and benefits. These options include use of cardioversion which provides an effective immediate therapy with success rates of 90% or higher, but it provides no short nor long term efficacy. Anti-arrhythmic medications has been very difficult to control left atrial flutter, both in regards to heart rate and rhythm control even with a powerful anti-arrhythmic medication (amiodarone). Left atrial flutter ablation is a potentially curative therapy with very reasonable success rate.      The risks, benefits and alternatives of the left atrial flutter ablation procedure were discussed with the patient. The risks including, but not limited to, bleeding, infection, radiation exposure, injury to vascular, cardiac and surrounding structures (including pneumothorax), stroke, cardiac perforation, tamponade, need for emergent open heart surgery, need for pacemaker implantation, injury to the phrenic nerve, injury to the esophagus, myocardial infarction and death were discussed in detail. The patient was also counseled at length about the risks of wilile Covid-19 in the zachary-operative and post-operative states including the recovery window of their procedure. The patient was made aware that willie Covid-19 after a surgical procedure may worsen their prognosis for recovering from the virus and lend to a higher morbidity and or mortality risk. The patient was given the option of postponing their procedure. The patient was also presented reasonable alternatives to the proposed care, treatment, and services. The discussion I have had with the patient encompassed risks, benefits, and side effects related to the alternatives and the risks related to not receiving the proposed care, treatment and services. I spent 40 minutes face to face with the patient, with greater than 50% of that time spent in counseling on the above. The patient opted to proceed with the left atrial flutter ablation. We will schedule for a radiofrequency ablation with [employer] system with a GIANNA procedure immediately prior to this ablation. We will hold Eliquis  for 12 hours prior to procedure. We will order BMP, CBC, PT/INR, and Type & Screen prior to the procedure. -Much time was spent counseling the patient on healthier lifestyle, following a low sodium diet <2,400 mg of sodium a day and dramatically decreasing the intake of processed sugar < 26 grams day. -Patient educated to eat a diet which includes organic fruits, vegetables and meats.     Hypertension, essential   -Stable    Follow up three months after the procedure with [facility]  [facility], CNP. Thank you for allowing me to participate in the care of Pablo Magana. All questions and concerns were addressed to the patient/family. Alternatives to my treatment were discussed. This note was scribed in the presence of Dr. Kings Reagan MD by Army Vick RN. The scribe's documentation has been prepared under my direction and personally reviewed by me in its entirety. I confirm that the note above accurately reflects all work, physical examination, the discussion of treatments and procedures, and medical decision making performed by me. Kings Reagan MD, MS, [facility] [address] Electrophysiology  [address], [address] [place] [address]  [facility] [address]  [phone]:  Reviewed.

## 2022-03-07 ENCOUNTER — OFFICE VISIT (OUTPATIENT)
Dept: CARDIOLOGY CLINIC | Age: 72
End: 2022-03-07
Payer: MEDICARE

## 2022-03-07 ENCOUNTER — NURSE ONLY (OUTPATIENT)
Dept: CARDIOLOGY CLINIC | Age: 72
End: 2022-03-07

## 2022-03-07 VITALS
SYSTOLIC BLOOD PRESSURE: 102 MMHG | HEART RATE: 71 BPM | BODY MASS INDEX: 20.66 KG/M2 | DIASTOLIC BLOOD PRESSURE: 62 MMHG | WEIGHT: 121 LBS | OXYGEN SATURATION: 97 % | HEIGHT: 64 IN

## 2022-03-07 DIAGNOSIS — R55 NEAR SYNCOPE: ICD-10-CM

## 2022-03-07 DIAGNOSIS — Z45.09 ENCOUNTER FOR ELECTRONIC ANALYSIS OF REVEAL EVENT RECORDER: ICD-10-CM

## 2022-03-07 DIAGNOSIS — R42 DIZZINESS: ICD-10-CM

## 2022-03-07 DIAGNOSIS — I48.0 PAF (PAROXYSMAL ATRIAL FIBRILLATION) (HCC): Primary | ICD-10-CM

## 2022-03-07 DIAGNOSIS — Z98.890 S/P ABLATION OF ATRIAL FIBRILLATION: ICD-10-CM

## 2022-03-07 DIAGNOSIS — I48.0 PAROXYSMAL ATRIAL FIBRILLATION (HCC): ICD-10-CM

## 2022-03-07 DIAGNOSIS — Z86.79 S/P ABLATION OF ATRIAL FIBRILLATION: ICD-10-CM

## 2022-03-07 DIAGNOSIS — I48.92 LEFT ATRIAL FLUTTER BY ELECTROCARDIOGRAM (HCC): ICD-10-CM

## 2022-03-07 DIAGNOSIS — Z79.01 CURRENT USE OF ANTICOAGULANT THERAPY: ICD-10-CM

## 2022-03-07 PROCEDURE — 1036F TOBACCO NON-USER: CPT | Performed by: INTERNAL MEDICINE

## 2022-03-07 PROCEDURE — 3017F COLORECTAL CA SCREEN DOC REV: CPT | Performed by: INTERNAL MEDICINE

## 2022-03-07 PROCEDURE — G8427 DOCREV CUR MEDS BY ELIG CLIN: HCPCS | Performed by: INTERNAL MEDICINE

## 2022-03-07 PROCEDURE — 4040F PNEUMOC VAC/ADMIN/RCVD: CPT | Performed by: INTERNAL MEDICINE

## 2022-03-07 PROCEDURE — 1090F PRES/ABSN URINE INCON ASSESS: CPT | Performed by: INTERNAL MEDICINE

## 2022-03-07 PROCEDURE — G8420 CALC BMI NORM PARAMETERS: HCPCS | Performed by: INTERNAL MEDICINE

## 2022-03-07 PROCEDURE — 99215 OFFICE O/P EST HI 40 MIN: CPT | Performed by: INTERNAL MEDICINE

## 2022-03-07 PROCEDURE — G8484 FLU IMMUNIZE NO ADMIN: HCPCS | Performed by: INTERNAL MEDICINE

## 2022-03-07 PROCEDURE — 1123F ACP DISCUSS/DSCN MKR DOCD: CPT | Performed by: INTERNAL MEDICINE

## 2022-03-07 PROCEDURE — G8399 PT W/DXA RESULTS DOCUMENT: HCPCS | Performed by: INTERNAL MEDICINE

## 2022-03-07 NOTE — PATIENT INSTRUCTIONS
If you have any question regarding your  ablation or would like to proceed with scheduling please contact Dr. Bernarda Hinojosa Nurse Janett Files at [phone]. Atrial Fibrillation Ablation Pre procedure Instructions    Date: 4-    Arrive at:  9:00 am    Procedure time: 10:30 am    The morning of your procedure you will park in the hospital parking lot and report directly to the cath lab to check in. At the information desk stay right and go all the way to the end of the mcconnell, this will take you directly to your check in desk for the cath lab. Pre-Procedure Instructions   1. You will need to fast (nothing to eat or drink) after midnight the day of your procedure  2. Do NOT chew gum or eat mints the day of your procedure. 3. You will need to hold your Eliquis for 12 hours prior to the procedure. 4. Do not use any lotions, creams or perfume the morning of procedure. 5. You will need to complete pre-procedure lab work 4-6 days prior to your procedure. 6. You will need to get a COVID test  4-6 days prior to your procedure, regardless of your vaccination status. You can get you COVID test at the [facility] lab (SEE BELOW). [facility] offer COVID testing at no cost. Please contact the location in which you would like to get the COVID testing completed to make sure it is one of the participating location. If you complete the testing at a location other than [facility] please bring results with you the day of your procedure. 7. Please have a responsible adult to drive you home after procedure, you should go home same day, but there is always a possibility of an overnight stay. 8. Cath lab will provide you with all post procedure instructions  9. A 3 month follow up will be scheduled with Dr. Bernarda Hinojosa Nurse practitioner Carina Olivarez CNP post procedure                                          [address] [place] [address]/[facility] Lab services  [address] [place] [address] Drive.   [address] [place] [id number]  Phone: [phone]  The hours are Mon -Fri. 6:30 am - 4:00 pm   Saturday 8:00 am - noon  No appointment necessary    You may complete pre procedure labs & COVID test at this location. COVID TESTING CAN ONLY BE COMPLETED   Between hours of 12:00 pm & 4:00 pm  Monday through Friday  No Appointment necessary. There are parking spaces behind the [address] [place] [address] were you see Dr. Sheridan Dennis designated for COVID test where you can pull up and call number listed on the pole and they will come out and complete covid test while you are in your car. Patient Education        Learning About Catheter Ablation for Heart Rhythm Problems  What is catheter ablation? Catheter ablation is a procedure that treats heart rhythm problems. These problems include atrial fibrillation, supraventricular tachycardia (SVT), atrial flutter, and ventricular tachycardia. Your heart should have a strong, steady beat. That beat is controlled by the heart's electrical system. Sometimes that system misfires. This causes a heartbeat that is too fast and isn't steady. Catheter ablation is a way to get into your heart and fix the problem. Ablation is not surgery. How is catheter ablation done? Your doctor inserts thin tubes called catheters into a blood vessel in your groin, arm, or neck. Then your doctor feeds them into the heart. Wires in the catheters help the doctor find the problem areas. Then the doctor uses the wires to send energy to destroy the tiny areas of heart tissue that are causing the problems. It may seem like a bad idea to destroy parts of your heart on purpose. But the areas that are destroyed are very tiny. They should not affect your heart's ability to do its job. You may be awake during the procedure. Or you may be asleep. The doctor will give you medicines to help you feel relaxed and to numb the areas where the catheters go in.  You may feel a little uncomfortable, but you should not feel pain.  What can you expect after catheter ablation? You may stay overnight in the hospital. How long you stay in the hospital depends on the type of ablation you have. Do not exercise hard or lift anything heavy for a week. You will probably be able to go back to work and to your normal routine in 1 or 2 days. You may have swelling, bruising, or a small lump around the site where the catheters went into your body. These should go away in 3 to 4 weeks. You may have to take some medicines for a while. Follow-up care is a key part of your treatment and safety. Be sure to make and go to all appointments, and call your doctor if you are having problems. It's also a good idea to know your test results and keep a list of the medicines you take. Where can you learn more? Go to https://Bespoke InnovationsrenettaCalmSea.[employer]. org and sign in to your [employer] account. Enter U444 in the 100du.tv box to learn more about \"Learning About Catheter Ablation for Heart Rhythm Problems. \"     If you do not have an account, please click on the \"Sign Up Now\" link. Current as of: April 29, 2021               Content Version: 13.1  © 0420-4318 [employer]. Care instructions adapted under license by [facility] ([facility]). If you have questions about a medical condition or this instruction, always ask your healthcare professional. [place] [address] any warranty or liability for your use of this information. Patient Education        Electrophysiology Study and Catheter Ablation: Before Your Procedure  What is an electrophysiology study and catheter ablation? An electrophysiology study is a test to see if there is a problem with your heart rhythm and to find out how to fix it. It is also called an EP study. A catheter ablation procedure is sometimes done at the same time. This procedure destroys (ablates) small areas of your heart that are causing your heart rhythm problem.   The doctor puts plastic tubes called catheters into blood vessels in your groin, arm, or neck. The doctor then uses an X-ray machine to guide long wires through the tubes to your heart. The doctor can use these wires to record your heart's electrical signals. If a problem with your heart can be fixed with ablation, the doctor can use the wires to destroy a small part of your heart tissue. This is most often done with radio waves. You may get medicine that relaxes you or puts you in a light sleep. Or you might be asleep during the procedure. The places where the catheters go in will be numb. An EP study and ablation can take 2 to 6 hours. In rare cases, it can take longer. If you have an EP study only and you don't need more treatment, you may go home the same day. But if you also have ablation, you may stay overnight in the hospital.  How do you prepare for the procedure? Procedures can be stressful. This information will help you understand what you can expect. And it will help you safely prepare for your procedure. Preparing for the procedure    · Be sure you have someone to take you home. Anesthesia and pain medicine will make it unsafe for you to drive or get home on your own.     · Understand exactly what procedure is planned, along with the risks, benefits, and other options.     · Tell your doctor ALL the medicines, vitamins, supplements, and herbal remedies you take. Some may increase the risk of problems during your procedure. Your doctor will tell you if you should stop taking any of them before the procedure and how soon to do it.     · If you take aspirin or some other blood thinner, ask your doctor if you should stop taking it before your procedure. Make sure that you understand exactly what your doctor wants you to do. These medicines increase the risk of bleeding.     · Make sure your doctor and the hospital have a copy of your advance directive. If you don't have one, you may want to prepare one.  It lets others know your health care wishes. It's a good thing to have before any type of surgery or procedure. What happens on the day of the procedure? · Follow the instructions exactly about when to stop eating and drinking. If you don't, your procedure may be canceled. If your doctor told you to take your medicines on the day of the procedure, take them with only a sip of water.     · Take a bath or shower before you come in for your procedure. Do not apply lotions, perfumes, deodorants, or nail polish.     · Take off all jewelry and piercings. And take out contact lenses, if you wear them. At the hospital or surgery center   · Bring a picture ID.     · You will be kept comfortable and safe by your anesthesia provider. You may get medicine that relaxes you or puts you in a light sleep. The area being worked on will be numb. Or the anesthesia may make you sleep.     · This procedure can take 2 to 6 hours. In rare cases, it can take longer.     · After the procedure, pressure may be applied to the areas where a catheter was put in a blood vessel. This will help prevent bleeding. A small device may also be used to close the blood vessel. You may have a bandage or a compression device on each catheter site.     · Nurses will check your heart rate and blood pressure. The nurse will also check the catheter site for bleeding.     · If the catheter was put in your groin, you will need to lie still and keep your leg straight for up to a few hours. The nurse may put a weighted bag on your leg to help you keep it still.     · If the catheter was put in your neck or arm, you may be able to sit up right away. If it was in your arm, you will need to keep your arm still for at least 1 hour.     · You may have a bruise or a small lump where the catheter was put in your blood vessel. This is normal and will go away. When should you call your doctor?    · You have questions or concerns.     · You don't understand how to prepare for your procedure.     · You become ill before the procedure (such as fever, flu, or a cold).     · You need to reschedule or have changed your mind about having the procedure. Where can you learn more? Go to https://Syndexa Pharmaceuticalslupeanjali.[employer]. org and sign in to your [employer] account. Enter K843 in the [facility] box to learn more about \"Electrophysiology Study and Catheter Ablation: Before Your Procedure. \"     If you do not have an account, please click on the \"Sign Up Now\" link. Current as of: April 29, 2021               Content Version: 13.1  © 2006-2021 [employer]. Care instructions adapted under license by [facility] ([facility]). If you have questions about a medical condition or this instruction, always ask your healthcare professional. [address] any warranty or liability for your use of this information. Patient Education        Electrophysiology Study and Catheter Ablation: What to Expect at [address] [place] [address] Drive had an electrophysiology study for a problem with your heartbeat. You may also have had a catheter ablation to try to correct the problem. You may have swelling, bruising, or a small lump around the sites where the catheters went into your body. These should go away in 3 to 4 weeks. You can do light activities at home. Don't do anything strenuous until your doctor says it is okay. This may be for several days. This care sheet gives you a general idea about how long it will take for you to recover. But each person recovers at a different pace. Follow the steps below to get better as quickly as possible. How can you care for yourself at home? Activity    · If the doctor gave you a sedative:  ? For 24 hours, don't do anything that requires attention to detail, such as going to work, making important decisions, or signing any legal documents.  It takes time for the medicine's effects to completely wear off.  ? For your safety, do not drive or operate any machinery that could be dangerous. Wait until the medicine wears off and you can think clearly and react easily.     · Do not do strenuous exercise and do not lift, pull, or push anything heavy until your doctor says it is okay. This may be for several days.     · Ask your doctor when it is okay to have sex. Diet    · You can eat your normal diet. If your stomach is upset, try bland, low-fat foods like plain rice, broiled chicken, toast, and yogurt.     · Drink plenty of fluids (unless your doctor tells you not to). Medicines    · Your doctor will tell you if and when you can restart your medicines. You will also get instructions about taking any new medicines.     · If you take aspirin or some other blood thinner, ask your doctor if and when to start taking it again. Make sure that you understand exactly what your doctor wants you to do.     · Be safe with medicines. Take your medicines exactly as prescribed. Call your doctor if you think you are having a problem with your medicine. Catheter site care    · For 1 or 2 days, keep a bandage over the spot where the catheter was inserted. The bandage probably will fall off in this time.     · Put ice or a cold pack on the area for 10 to 20 minutes at a time to help with soreness or swelling. Put a thin cloth between the ice and your skin.     · You may shower 24 to 48 hours after the procedure, if your doctor okays it. Pat the incision dry.     · Do not soak the catheter site until it is healed. Don't take a bath for 1 week, or until your doctor tells you it is okay.     · Watch for bleeding from the site. A small amount of blood (up to the size of a quarter) on the bandage can be normal.     · If you are bleeding, lie down and press on the area for 15 minutes to try to make it stop. If the bleeding does not stop, call your doctor or seek immediate medical care. Follow-up care is a key part of your treatment and safety.  Be sure to make and go to all appointments, and call your doctor if you are having problems. It's also a good idea to know your test results and keep a list of the medicines you take. When should you call for help? Call 911  anytime you think you may need emergency care. For example, call if:    · You passed out (lost consciousness).     · You have symptoms of a heart attack. These may include:  ? Chest pain or pressure, or a strange feeling in the chest.  ? Sweating. ? Shortness of breath. ? Nausea or vomiting. ? Pain, pressure, or a strange feeling in the back, neck, jaw, or upper belly, or in one or both shoulders or arms. ? Lightheadedness or sudden weakness. ? A fast or irregular heartbeat. After you call 911, the  may tell you to chew 1 adult-strength or 2 to 4 low-dose aspirin. Wait for an ambulance. Do not try to drive yourself.     · You have symptoms of a stroke. These may include:  ? Sudden numbness, tingling, weakness, or loss of movement in your face, arm, or leg, especially on only one side of your body. ? Sudden vision changes. ? Sudden trouble speaking. ? Sudden confusion or trouble understanding simple statements. ? Sudden problems with walking or balance. ? A sudden, severe headache that is different from past headaches. Call your doctor now or seek immediate medical care if:    · You are bleeding from the area where the catheter was put in your blood vessel.     · You have a fast-growing, painful lump at the catheter site.     · You have signs of infection, such as:  ? Increased pain, swelling, warmth, or redness. ? Red streaks leading from the catheter site. ? Pus draining from the catheter site. ? A fever.     · Your leg, arm, or hand is painful, looks blue, or feels cold, numb, or tingly. Watch closely for any changes in your health, and be sure to contact your doctor if you have any problems. Where can you learn more? Go to https://wyatt.healthBright Industry. org and sign in to your MyChart account. Enter [phone] in the [facility] box to learn more about \"Electrophysiology Study and Catheter Ablation: What to Expect at Home. \"     If you do not have an account, please click on the \"Sign Up Now\" link. Current as of: April 29, 2021               Content Version: 13.1  © 1154-7294 [employer]. Care instructions adapted under license by [facility] ([facility]). If you have questions about a medical condition or this instruction, always ask your healthcare professional. [address] any warranty or liability for your use of this information.

## 2022-03-07 NOTE — PROGRESS NOTES
Pt seen in clinic today for cardiac device interrogation. Their device is a MDT ILR   the device appears to be functioning normally. Remaining battery life is \"OK\"    last remote check 2/24/2022 - 10 AF - longest 3/6/2022 50min  3 tachy longest 18 sec 200 bpm - 3/4/2022  4 sx 3/1 and 3/4 match with detected AF  See PDF for EGM's    Pt was informed of findings today and general questions have been answered with regard to device. Home monitoring hardware is transmitting, pt reminded that manual transmission were not needed unless specifically requested. Results discussed with or to be reviewed by EP    Pt to see Dr. Candelaria Bell today in clinic.

## 2022-04-07 ENCOUNTER — NURSE ONLY (OUTPATIENT)
Dept: CARDIOLOGY CLINIC | Age: 72
End: 2022-04-07
Payer: MEDICARE

## 2022-04-07 DIAGNOSIS — Z45.09 ENCOUNTER FOR ELECTRONIC ANALYSIS OF REVEAL EVENT RECORDER: Chronic | ICD-10-CM

## 2022-04-07 DIAGNOSIS — Z98.890 S/P ABLATION OF ATRIAL FIBRILLATION: ICD-10-CM

## 2022-04-07 DIAGNOSIS — Z86.79 S/P ABLATION OF ATRIAL FIBRILLATION: ICD-10-CM

## 2022-04-07 DIAGNOSIS — I48.0 PAF (PAROXYSMAL ATRIAL FIBRILLATION) (HCC): ICD-10-CM

## 2022-04-07 DIAGNOSIS — I48.92 LEFT ATRIAL FLUTTER BY ELECTROCARDIOGRAM (HCC): ICD-10-CM

## 2022-04-08 NOTE — PROGRESS NOTES
ILR for palpitations/suspected AF (h/o atrial fibrillation s/p ablation by Dr. Wallace Diss the recent 1-2 years, most recent AF/L ABL 09.21.21). We received a remote transmission from patient's ILR monitor at home. Since 02.23.22:   18 Symptom, 18 Tachy, 30 AF (1.4% burden) w available ECGs illustrating what appears to be AT/AF/AFL w RVR, as well as some NSR/ST w ectopy. Pt on Eliquis. AF ABL scheduled 04.22.22/[facility]W. EP physician to review. We will continue to monitor remotely.

## 2022-04-10 PROCEDURE — 93298 REM INTERROG DEV EVAL SCRMS: CPT | Performed by: INTERNAL MEDICINE

## 2022-04-10 PROCEDURE — G2066 INTER DEVC REMOTE 30D: HCPCS | Performed by: INTERNAL MEDICINE

## 2022-04-18 DIAGNOSIS — I48.92 LEFT ATRIAL FLUTTER BY ELECTROCARDIOGRAM (HCC): ICD-10-CM

## 2022-04-18 DIAGNOSIS — I48.0 PAF (PAROXYSMAL ATRIAL FIBRILLATION) (HCC): ICD-10-CM

## 2022-04-18 LAB
ABO/RH: NORMAL
ANION GAP SERPL CALCULATED.3IONS-SCNC: 13 MMOL/L (ref 3–16)
ANTIBODY SCREEN: NORMAL
BUN BLDV-MCNC: 11 MG/DL (ref 7–20)
CALCIUM SERPL-MCNC: 10 MG/DL (ref 8.3–10.6)
CHLORIDE BLD-SCNC: 104 MMOL/L (ref 99–110)
CO2: 27 MMOL/L (ref 21–32)
CREAT SERPL-MCNC: 1 MG/DL (ref 0.6–1.2)
GFR AFRICAN AMERICAN: >60
GFR NON-AFRICAN AMERICAN: 54
GLUCOSE BLD-MCNC: 105 MG/DL (ref 70–99)
HCT VFR BLD CALC: 38.4 % (ref 36–48)
HEMOGLOBIN: 12.8 G/DL (ref 12–16)
MCH RBC QN AUTO: 29.4 PG (ref 26–34)
MCHC RBC AUTO-ENTMCNC: 33.3 G/DL (ref 31–36)
MCV RBC AUTO: 88.3 FL (ref 80–100)
PDW BLD-RTO: 13.3 % (ref 12.4–15.4)
PLATELET # BLD: 189 K/UL (ref 135–450)
PMV BLD AUTO: 9.6 FL (ref 5–10.5)
POTASSIUM SERPL-SCNC: 5 MMOL/L (ref 3.5–5.1)
RBC # BLD: 4.34 M/UL (ref 4–5.2)
SODIUM BLD-SCNC: 144 MMOL/L (ref 136–145)
WBC # BLD: 4.7 K/UL (ref 4–11)

## 2022-04-20 LAB — SARS-COV-2: NOT DETECTED

## 2022-04-22 ENCOUNTER — ANESTHESIA (OUTPATIENT)
Dept: CARDIAC CATH/INVASIVE PROCEDURES | Age: 72
End: 2022-04-22

## 2022-04-22 ENCOUNTER — HOSPITAL ENCOUNTER (OUTPATIENT)
Dept: CARDIAC CATH/INVASIVE PROCEDURES | Age: 72
Discharge: HOME | End: 2022-04-22
Payer: MEDICARE

## 2022-04-22 ENCOUNTER — ANESTHESIA EVENT (OUTPATIENT)
Dept: CARDIAC CATH/INVASIVE PROCEDURES | Age: 72
End: 2022-04-22

## 2022-04-22 VITALS
BODY MASS INDEX: 19.97 KG/M2 | OXYGEN SATURATION: 100 % | SYSTOLIC BLOOD PRESSURE: 112 MMHG | HEIGHT: 64 IN | RESPIRATION RATE: 16 BRPM | TEMPERATURE: 97.2 F | HEART RATE: 70 BPM | WEIGHT: 117 LBS | DIASTOLIC BLOOD PRESSURE: 71 MMHG

## 2022-04-22 VITALS
RESPIRATION RATE: 8 BRPM | DIASTOLIC BLOOD PRESSURE: 89 MMHG | TEMPERATURE: 94.8 F | SYSTOLIC BLOOD PRESSURE: 155 MMHG | OXYGEN SATURATION: 94 %

## 2022-04-22 LAB
ABO/RH: NORMAL
ANTIBODY SCREEN: NORMAL
EKG ATRIAL RATE: 75 BPM
EKG DIAGNOSIS: NORMAL
EKG P AXIS: 80 DEGREES
EKG P-R INTERVAL: 174 MS
EKG Q-T INTERVAL: 396 MS
EKG QRS DURATION: 66 MS
EKG QTC CALCULATION (BAZETT): 442 MS
EKG R AXIS: 43 DEGREES
EKG T AXIS: 67 DEGREES
EKG VENTRICULAR RATE: 75 BPM
POC ACT LR: 332 SEC

## 2022-04-22 PROCEDURE — 86901 BLOOD TYPING SEROLOGIC RH(D): CPT

## 2022-04-22 PROCEDURE — 93656 COMPRE EP EVAL ABLTJ ATR FIB: CPT

## 2022-04-22 PROCEDURE — C1894 INTRO/SHEATH, NON-LASER: HCPCS

## 2022-04-22 PROCEDURE — 2500000003 HC RX 250 WO HCPCS

## 2022-04-22 PROCEDURE — 93656 COMPRE EP EVAL ABLTJ ATR FIB: CPT | Performed by: INTERNAL MEDICINE

## 2022-04-22 PROCEDURE — C1730 CATH, EP, 19 OR FEW ELECT: HCPCS

## 2022-04-22 PROCEDURE — 93005 ELECTROCARDIOGRAM TRACING: CPT | Performed by: INTERNAL MEDICINE

## 2022-04-22 PROCEDURE — C1893 INTRO/SHEATH, FIXED,NON-PEEL: HCPCS

## 2022-04-22 PROCEDURE — C1732 CATH, EP, DIAG/ABL, 3D/VECT: HCPCS

## 2022-04-22 PROCEDURE — 93623 PRGRMD STIMJ&PACG IV RX NFS: CPT | Performed by: INTERNAL MEDICINE

## 2022-04-22 PROCEDURE — 6360000002 HC RX W HCPCS

## 2022-04-22 PROCEDURE — 85347 COAGULATION TIME ACTIVATED: CPT

## 2022-04-22 PROCEDURE — C1759 CATH, INTRA ECHOCARDIOGRAPHY: HCPCS

## 2022-04-22 PROCEDURE — 3700000001 HC ADD 15 MINUTES (ANESTHESIA)

## 2022-04-22 PROCEDURE — 2720000010 HC SURG SUPPLY STERILE

## 2022-04-22 PROCEDURE — 2709999900 HC NON-CHARGEABLE SUPPLY

## 2022-04-22 PROCEDURE — 86900 BLOOD TYPING SEROLOGIC ABO: CPT

## 2022-04-22 PROCEDURE — A4216 STERILE WATER/SALINE, 10 ML: HCPCS

## 2022-04-22 PROCEDURE — 2580000003 HC RX 258

## 2022-04-22 PROCEDURE — 93622 COMP EP EVAL L VENTR PAC&REC: CPT

## 2022-04-22 PROCEDURE — 2580000003 HC RX 258: Performed by: INTERNAL MEDICINE

## 2022-04-22 PROCEDURE — 93655 ICAR CATH ABLTJ DSCRT ARRHYT: CPT

## 2022-04-22 PROCEDURE — 93622 COMP EP EVAL L VENTR PAC&REC: CPT | Performed by: INTERNAL MEDICINE

## 2022-04-22 PROCEDURE — 7100000001 HC PACU RECOVERY - ADDTL 15 MIN

## 2022-04-22 PROCEDURE — 86850 RBC ANTIBODY SCREEN: CPT

## 2022-04-22 PROCEDURE — 93657 TX L/R ATRIAL FIB ADDL: CPT | Performed by: INTERNAL MEDICINE

## 2022-04-22 PROCEDURE — 3700000000 HC ANESTHESIA ATTENDED CARE

## 2022-04-22 PROCEDURE — 7100000000 HC PACU RECOVERY - FIRST 15 MIN

## 2022-04-22 PROCEDURE — 93655 ICAR CATH ABLTJ DSCRT ARRHYT: CPT | Performed by: INTERNAL MEDICINE

## 2022-04-22 PROCEDURE — 93623 PRGRMD STIMJ&PACG IV RX NFS: CPT

## 2022-04-22 PROCEDURE — 93657 TX L/R ATRIAL FIB ADDL: CPT

## 2022-04-22 RX ORDER — DIPHENHYDRAMINE HYDROCHLORIDE 50 MG/ML
12.5 INJECTION INTRAMUSCULAR; INTRAVENOUS
Status: ACTIVE | OUTPATIENT
Start: 2022-04-22 | End: 2022-04-22

## 2022-04-22 RX ORDER — SODIUM CHLORIDE 0.9 % (FLUSH) 0.9 %
5-40 SYRINGE (ML) INJECTION PRN
Status: DISCONTINUED | OUTPATIENT
Start: 2022-04-22 | End: 2022-04-23 | Stop reason: HOSPADM

## 2022-04-22 RX ORDER — SODIUM CHLORIDE 9 MG/ML
INJECTION, SOLUTION INTRAVENOUS PRN
Status: DISCONTINUED | OUTPATIENT
Start: 2022-04-22 | End: 2022-04-23 | Stop reason: HOSPADM

## 2022-04-22 RX ORDER — FLECAINIDE ACETATE 50 MG/1
25 TABLET ORAL EVERY 12 HOURS SCHEDULED
Status: DISCONTINUED | OUTPATIENT
Start: 2022-04-22 | End: 2022-04-23 | Stop reason: HOSPADM

## 2022-04-22 RX ORDER — SODIUM CHLORIDE 0.9 % (FLUSH) 0.9 %
5-40 SYRINGE (ML) INJECTION EVERY 12 HOURS SCHEDULED
Status: DISCONTINUED | OUTPATIENT
Start: 2022-04-22 | End: 2022-04-23 | Stop reason: HOSPADM

## 2022-04-22 RX ORDER — PHENYLEPHRINE HCL IN 0.9% NACL 1 MG/10 ML
SYRINGE (ML) INTRAVENOUS PRN
Status: DISCONTINUED | OUTPATIENT
Start: 2022-04-22 | End: 2022-04-22 | Stop reason: SDUPTHER

## 2022-04-22 RX ORDER — SODIUM CHLORIDE 9 MG/ML
INJECTION INTRAVENOUS PRN
Status: DISCONTINUED | OUTPATIENT
Start: 2022-04-22 | End: 2022-04-22 | Stop reason: SDUPTHER

## 2022-04-22 RX ORDER — MEPERIDINE HYDROCHLORIDE 25 MG/ML
12.5 INJECTION INTRAMUSCULAR; INTRAVENOUS; SUBCUTANEOUS EVERY 5 MIN PRN
Status: DISCONTINUED | OUTPATIENT
Start: 2022-04-22 | End: 2022-04-23 | Stop reason: HOSPADM

## 2022-04-22 RX ORDER — HEPARIN SODIUM 10000 [USP'U]/100ML
INJECTION, SOLUTION INTRAVENOUS CONTINUOUS PRN
Status: DISCONTINUED | OUTPATIENT
Start: 2022-04-22 | End: 2022-04-22 | Stop reason: SDUPTHER

## 2022-04-22 RX ORDER — FENTANYL CITRATE 50 UG/ML
25 INJECTION, SOLUTION INTRAMUSCULAR; INTRAVENOUS EVERY 5 MIN PRN
Status: DISCONTINUED | OUTPATIENT
Start: 2022-04-22 | End: 2022-04-23 | Stop reason: HOSPADM

## 2022-04-22 RX ORDER — METOPROLOL SUCCINATE 25 MG/1
12.5 TABLET, EXTENDED RELEASE ORAL DAILY
Status: DISCONTINUED | OUTPATIENT
Start: 2022-04-22 | End: 2022-04-23 | Stop reason: HOSPADM

## 2022-04-22 RX ORDER — ACETAMINOPHEN 325 MG/1
650 TABLET ORAL EVERY 4 HOURS PRN
Status: DISCONTINUED | OUTPATIENT
Start: 2022-04-22 | End: 2022-04-23 | Stop reason: HOSPADM

## 2022-04-22 RX ORDER — FLECAINIDE ACETATE 50 MG/1
25 TABLET ORAL EVERY 12 HOURS SCHEDULED
Qty: 60 TABLET | Refills: 3 | Status: SHIPPED | OUTPATIENT
Start: 2022-04-22 | End: 2022-08-01 | Stop reason: ALTCHOICE

## 2022-04-22 RX ORDER — DEXAMETHASONE SODIUM PHOSPHATE 4 MG/ML
INJECTION, SOLUTION INTRA-ARTICULAR; INTRALESIONAL; INTRAMUSCULAR; INTRAVENOUS; SOFT TISSUE PRN
Status: DISCONTINUED | OUTPATIENT
Start: 2022-04-22 | End: 2022-04-22 | Stop reason: SDUPTHER

## 2022-04-22 RX ORDER — 0.9 % SODIUM CHLORIDE 0.9 %
1000 INTRAVENOUS SOLUTION INTRAVENOUS
Status: ACTIVE | OUTPATIENT
Start: 2022-04-22 | End: 2022-04-22

## 2022-04-22 RX ORDER — METOPROLOL SUCCINATE 25 MG/1
12.5 TABLET, EXTENDED RELEASE ORAL DAILY
Qty: 30 TABLET | Refills: 3 | Status: SHIPPED | OUTPATIENT
Start: 2022-04-22 | End: 2022-08-01 | Stop reason: ALTCHOICE

## 2022-04-22 RX ORDER — DOBUTAMINE HYDROCHLORIDE 200 MG/100ML
INJECTION INTRAVENOUS CONTINUOUS PRN
Status: DISCONTINUED | OUTPATIENT
Start: 2022-04-22 | End: 2022-04-22 | Stop reason: SDUPTHER

## 2022-04-22 RX ORDER — ONDANSETRON 2 MG/ML
INJECTION INTRAMUSCULAR; INTRAVENOUS PRN
Status: DISCONTINUED | OUTPATIENT
Start: 2022-04-22 | End: 2022-04-22 | Stop reason: SDUPTHER

## 2022-04-22 RX ORDER — VECURONIUM BROMIDE 1 MG/ML
INJECTION, POWDER, LYOPHILIZED, FOR SOLUTION INTRAVENOUS PRN
Status: DISCONTINUED | OUTPATIENT
Start: 2022-04-22 | End: 2022-04-22 | Stop reason: SDUPTHER

## 2022-04-22 RX ORDER — HEPARIN SODIUM 1000 [USP'U]/ML
INJECTION, SOLUTION INTRAVENOUS; SUBCUTANEOUS PRN
Status: DISCONTINUED | OUTPATIENT
Start: 2022-04-22 | End: 2022-04-22 | Stop reason: SDUPTHER

## 2022-04-22 RX ORDER — ONDANSETRON 2 MG/ML
4 INJECTION INTRAMUSCULAR; INTRAVENOUS
Status: ACTIVE | OUTPATIENT
Start: 2022-04-22 | End: 2022-04-22

## 2022-04-22 RX ORDER — PROTAMINE SULFATE 10 MG/ML
30 INJECTION, SOLUTION INTRAVENOUS
Status: ACTIVE | OUTPATIENT
Start: 2022-04-22 | End: 2022-04-22

## 2022-04-22 RX ORDER — SCOLOPAMINE TRANSDERMAL SYSTEM 1 MG/1
1 PATCH, EXTENDED RELEASE TRANSDERMAL
Status: CANCELLED | OUTPATIENT
Start: 2022-04-22

## 2022-04-22 RX ORDER — LORAZEPAM 2 MG/ML
0.5 INJECTION INTRAMUSCULAR
Status: ACTIVE | OUTPATIENT
Start: 2022-04-22 | End: 2022-04-22

## 2022-04-22 RX ORDER — LIDOCAINE HYDROCHLORIDE AND EPINEPHRINE BITARTRATE 20; .01 MG/ML; MG/ML
15 INJECTION, SOLUTION SUBCUTANEOUS SEE ADMIN INSTRUCTIONS
Status: DISCONTINUED | OUTPATIENT
Start: 2022-04-22 | End: 2022-04-23 | Stop reason: HOSPADM

## 2022-04-22 RX ORDER — GLYCOPYRROLATE 0.2 MG/ML
INJECTION INTRAMUSCULAR; INTRAVENOUS PRN
Status: DISCONTINUED | OUTPATIENT
Start: 2022-04-22 | End: 2022-04-22 | Stop reason: SDUPTHER

## 2022-04-22 RX ORDER — FENTANYL CITRATE 50 UG/ML
INJECTION, SOLUTION INTRAMUSCULAR; INTRAVENOUS PRN
Status: DISCONTINUED | OUTPATIENT
Start: 2022-04-22 | End: 2022-04-22 | Stop reason: SDUPTHER

## 2022-04-22 RX ORDER — FUROSEMIDE 10 MG/ML
INJECTION INTRAMUSCULAR; INTRAVENOUS PRN
Status: DISCONTINUED | OUTPATIENT
Start: 2022-04-22 | End: 2022-04-22 | Stop reason: SDUPTHER

## 2022-04-22 RX ORDER — PROPOFOL 10 MG/ML
INJECTION, EMULSION INTRAVENOUS PRN
Status: DISCONTINUED | OUTPATIENT
Start: 2022-04-22 | End: 2022-04-22 | Stop reason: SDUPTHER

## 2022-04-22 RX ORDER — PROTAMINE SULFATE 10 MG/ML
INJECTION, SOLUTION INTRAVENOUS PRN
Status: DISCONTINUED | OUTPATIENT
Start: 2022-04-22 | End: 2022-04-22 | Stop reason: SDUPTHER

## 2022-04-22 RX ORDER — SUCCINYLCHOLINE/SOD CL,ISO/PF 200MG/10ML
SYRINGE (ML) INTRAVENOUS PRN
Status: DISCONTINUED | OUTPATIENT
Start: 2022-04-22 | End: 2022-04-22 | Stop reason: SDUPTHER

## 2022-04-22 RX ORDER — MIDAZOLAM HYDROCHLORIDE 1 MG/ML
INJECTION INTRAMUSCULAR; INTRAVENOUS PRN
Status: DISCONTINUED | OUTPATIENT
Start: 2022-04-22 | End: 2022-04-22 | Stop reason: SDUPTHER

## 2022-04-22 RX ORDER — FAMOTIDINE 20 MG/1
20 TABLET, FILM COATED ORAL 2 TIMES DAILY
Status: DISCONTINUED | OUTPATIENT
Start: 2022-04-22 | End: 2022-04-23 | Stop reason: HOSPADM

## 2022-04-22 RX ADMIN — Medication 50 MCG: at 11:44

## 2022-04-22 RX ADMIN — MIDAZOLAM 0.5 MG: 1 INJECTION INTRAMUSCULAR; INTRAVENOUS at 10:57

## 2022-04-22 RX ADMIN — SODIUM CHLORIDE: 9 INJECTION, SOLUTION INTRAVENOUS at 10:51

## 2022-04-22 RX ADMIN — FENTANYL CITRATE 50 MCG: 50 INJECTION INTRAMUSCULAR; INTRAVENOUS at 10:58

## 2022-04-22 RX ADMIN — SODIUM CHLORIDE 10 ML: 9 INJECTION INTRAMUSCULAR; INTRAVENOUS; SUBCUTANEOUS at 11:07

## 2022-04-22 RX ADMIN — MIDAZOLAM 0.5 MG: 1 INJECTION INTRAMUSCULAR; INTRAVENOUS at 11:08

## 2022-04-22 RX ADMIN — MIDAZOLAM 1 MG: 1 INJECTION INTRAMUSCULAR; INTRAVENOUS at 10:54

## 2022-04-22 RX ADMIN — VECURONIUM BROMIDE 5 MG: 1 INJECTION, POWDER, LYOPHILIZED, FOR SOLUTION INTRAVENOUS at 11:09

## 2022-04-22 RX ADMIN — VECURONIUM BROMIDE 5 MG: 1 INJECTION, POWDER, LYOPHILIZED, FOR SOLUTION INTRAVENOUS at 12:03

## 2022-04-22 RX ADMIN — ONDANSETRON 4 MG: 2 INJECTION INTRAMUSCULAR; INTRAVENOUS at 11:07

## 2022-04-22 RX ADMIN — GLYCOPYRROLATE 0.2 MG: 0.2 INJECTION, SOLUTION INTRAMUSCULAR; INTRAVENOUS at 11:07

## 2022-04-22 RX ADMIN — DOBUTAMINE HYDROCHLORIDE 10 MCG/KG/MIN: 200 INJECTION INTRAVENOUS at 12:37

## 2022-04-22 RX ADMIN — PROPOFOL 120 MG: 10 INJECTION, EMULSION INTRAVENOUS at 10:59

## 2022-04-22 RX ADMIN — HEPARIN SODIUM 3000 UNITS: 1000 INJECTION INTRAVENOUS; SUBCUTANEOUS at 11:56

## 2022-04-22 RX ADMIN — HEPARIN SODIUM 3000 UNITS: 1000 INJECTION INTRAVENOUS; SUBCUTANEOUS at 11:50

## 2022-04-22 RX ADMIN — FUROSEMIDE 40 MG: 10 INJECTION, SOLUTION INTRAMUSCULAR; INTRAVENOUS at 12:52

## 2022-04-22 RX ADMIN — SUGAMMADEX 150 MG: 100 INJECTION, SOLUTION INTRAVENOUS at 12:46

## 2022-04-22 RX ADMIN — HEPARIN SODIUM 5000 UNITS/HR: 10000 INJECTION, SOLUTION INTRAVENOUS at 12:19

## 2022-04-22 RX ADMIN — Medication 100 MCG: at 11:28

## 2022-04-22 RX ADMIN — Medication 120 MG: at 11:00

## 2022-04-22 RX ADMIN — FENTANYL CITRATE 50 MCG: 50 INJECTION INTRAMUSCULAR; INTRAVENOUS at 11:48

## 2022-04-22 RX ADMIN — DEXAMETHASONE SODIUM PHOSPHATE 8 MG: 4 INJECTION, SOLUTION INTRAMUSCULAR; INTRAVENOUS at 11:07

## 2022-04-22 RX ADMIN — PROTAMINE SULFATE 150 MG: 10 INJECTION, SOLUTION INTRAVENOUS at 12:46

## 2022-04-22 ASSESSMENT — PULMONARY FUNCTION TESTS
PIF_VALUE: 13
PIF_VALUE: 0
PIF_VALUE: 14
PIF_VALUE: 2
PIF_VALUE: 13
PIF_VALUE: 5
PIF_VALUE: 13
PIF_VALUE: 14
PIF_VALUE: 13
PIF_VALUE: 13
PIF_VALUE: 0
PIF_VALUE: 13
PIF_VALUE: 13
PIF_VALUE: 12
PIF_VALUE: 12
PIF_VALUE: 3
PIF_VALUE: 13
PIF_VALUE: 2
PIF_VALUE: 13
PIF_VALUE: 12
PIF_VALUE: 13
PIF_VALUE: 12
PIF_VALUE: 13
PIF_VALUE: 1
PIF_VALUE: 13
PIF_VALUE: 19
PIF_VALUE: 13
PIF_VALUE: 3
PIF_VALUE: 13
PIF_VALUE: 26
PIF_VALUE: 13
PIF_VALUE: 1
PIF_VALUE: 13
PIF_VALUE: 1
PIF_VALUE: 13
PIF_VALUE: 24
PIF_VALUE: 13
PIF_VALUE: 24
PIF_VALUE: 13
PIF_VALUE: 12
PIF_VALUE: 13
PIF_VALUE: 0
PIF_VALUE: 13
PIF_VALUE: 14
PIF_VALUE: 13
PIF_VALUE: 13
PIF_VALUE: 0
PIF_VALUE: 13
PIF_VALUE: 1
PIF_VALUE: 13

## 2022-04-22 ASSESSMENT — LIFESTYLE VARIABLES: SMOKING_STATUS: 0

## 2022-04-22 ASSESSMENT — ENCOUNTER SYMPTOMS: SHORTNESS OF BREATH: 0

## 2022-04-22 NOTE — PROCEDURES
[address]     Electrophysiology Procedure Note       Date of Procedure: 4/22/2022  Patient's Name: Lauro Hickman  YOB: 1950   Medical Record Number: [id number]  Referring Physician: Janet Solis DO  Procedure Performed by: Yaya Raygoza MD    Procedure performed:     · Electrophysiology study with radiofrequency ablation of left atrial flutter and paroxysmal atrial fibrillation and pulmonary vein isolation   · Ablation of roof-dependent left atrial flutter with  ms with straight up-down activation  · Ablation of anterior wall dependent left atrial flutter with  ms with proximal-distal activation  · Additional ablation with creation of a roof line (for roof-dependent left atrial flutter), anterior line from mitral annulus to the roof (for anterior wall dependent left atrial flutter)  · Additional ablation of complex fractionated atrial electrograms (for atrial fibrillation) on the septal LA wall  · Additional ablation of complex fractionated atrial electrograms (for atrial fibrillation) on the anterior base of the LA appendage  · 3-D electroanatomical mapping of the left atrium    · Transseptal puncture through an intact septum x 2 under intracardiac ultrasound guidance without fluoroscopic guidance (this attempt took 4-5 times longer than usual due to a relatively aneurysmal fossa ovalis). · Intracardiac echocardiography  · Left ventricular pacing and recording  · Drug infusion with an attempt to induce atrial tachydysrhythmia  · Anesthesia: General anesthesia provided by the Anesthesia service      Indications for procedure: Symptomatic atrial flutter, failed antiarrhythmic therapy and cardioversion in the past   Lauro Hickman is a 66 y.o. female who has a history of left atrial flutter who is symptomatic with symptoms of dyspnea with minimal exertion and fatigue who has failed antiarrhythmic therapy in the past is now here for an ablation for left atrial flutter. Details of Procedure: The risks, benefits and alternatives of the ablation procedure were discussed with the patient. The risks including, but not limited to, the risks of bleeding, infection, radiation exposure, injury to vascular, cardiac and surrounding structures (including pneumothorax), stroke, cardiac perforation, tamponade, need for emergent open heart surgery, need for pacemaker implantation, esophageal injury and fistula, myocardial infarction and death were discussed in detail. The patient was also counseled at length about the risks of willie Covid-19 in the zachary-operative and post-operative states including the recovery window of their procedure. The patient was made aware that willie Covid-19 after a surgical procedure may worsen their prognosis for recovering from the virus and lend to a higher morbidity and or mortality risk. The patient was given the option of postponing their procedure. The patient was also presented reasonable alternatives to the proposed care, treatment, and services. The discussion I have had with the patient encompassed risks, benefits, and side effects related to the alternatives and the risks related to not receiving the proposed care, treatment and services. The patient opted to proceed with the ablation. Written informed consent was signed and placed in the chart. Patient was brought to the EP lab in a fasting non-sedate state. Patient underwent general anesthesia by anesthesia team. The patient was monitored continuously with ECG, pulse oximetry, blood pressure monitoring, and direct observation. No urinary green was placed in order to prevent any hematuria or urinary tract infection. Both groins were prepped in a sterile fashion. We gained access in the right femoral vein. One 8 Nigerian short sheath for ICE and subsequently for CS catheters were placed in the right femoral vein using modified seldinger technique.  Two 8.5F SLO sheaths were introduced into the right femoral vein using modified Seldinger technique. Then a CS cathter was placed inside the coronary sinus without fluoroscopy but with 3-D electroanatomic mapping for recording and mapping of the left atrium. Using ICE we delineated the left pulmonary vein, left atrial appendage,  mitral valve, and right superior and right inferior pulmonary veins, and the trivial amount of pericardial effusion prior to ablation. Two transeptal punctures were performed under intracardiac echocardiogram and pressure monitoring and without the use of fluoroscopy. Of note, the transeptal puncture attempts x 2 took 4-5 times longer than usual due to the relatively aneurysmal fossa ovalis along with the lateral wall of the LA being somewhat close by when tenting the fossa ovalis. We were eventually able to cross the fossa ovalis with our two attempts at transeptal puncture via BRK1 needle. Patient received a bolus of heparin shortly after each transseptal puncture followed by continuous monitoring of the ACT every 15-30 minutes, and additional boluses of heparin during the procedure to keep the ACT between 300-400 sec. We placed both SLO sheaths inside the left atrium. Also an esophageal temperature probe ([employer] Temperature Probe 12Fr) that was tied with sutures to an accompanying [employer] quadripolar 6 Austrian 5-5-5 electrode spacing catheter was advanced into the esophagus for real-time mapping of the esophagus and careful monitoring of the esophageal temperature during ablation. Using the Penta ray cathter and Carto navigation system a three dimensional electro anatomical mapping of the left atrium, in addition to right and left sided pulmonary vein anatomy was created. During sinus rhythm, we found that all four pulmonary veins (left superior, left inferior, right superior, and right inferior) did NOT have PV fascicles, the triggers for the atrial fibrillation.  We found that the roof was nearly electrically blocked while the R anterior antrum had a small gap. The L antrum was entirely electrically isolated, and the rest of the R antrum, except for the anterior R antrum (small gap) was electrically isolated. With rapid atrial pacing at 210 ms, we were able to easily induce paroxysmal atrial fibrillation which also then organized into left atrial flutter with tachycardia cycle length 204 ms with straight up-down activation, most likely utilizing the roof of the LA, along with another left atrial flutter with TCL 230ms with proximal-distal activation, most likely utilizing the anterior wall of the LA. Then wide area circumferential ablation for the right sided pulmonary veins anterior R antrum were performed which resulted in electrical isolation of this antrum. The atrial fibrillation and the two different left atrial flutters terminated to sinus rhythm for the remainder of the procedure when starting this ablation. After isolating the R antrum, given that the patient manifested a roof-dependent left atrial flutter, we ablated a linear line along the roof of the left atrium. Given that the patient manifested anterior wall dependent left atrial flutter, we ablated a linear ablation on the anterior wall from the mitral annulus to the roof to target the anterior wall dependent left atrial flutter. We then evaluated the left atrium for complex fractionated atrial electrogram, a separate mechanism that would initiate and/or perpetuate atrial fibrillation apart from the pulmonary vein fascicles and antral ablations. We found 2 site(s) on the septal aspect of the LA and ablated these foci. We then evaluated the anterior wall of the LA for complex fractionated atrial electrogram, a seprate mechanism that would initiate and/or perpetuate atrial fibrillation apart from the pulmonary vein fascicles and antral ablations. We found 1 site(s) on the anterior wall of the LA and ablated this focus.     After ablation was complete, catheters were placed in the left and right atrium, His-position, right ventricle, and left ventricle for pacing and recording. Arrhythmia was attempted to be induced by rapid atrial and ventricular pacing, and there was no induction of atrial tachydysrhythmia. Maximum output (20mA) pacing in the L antrum and R antrum were also performed and showed dissociation from the rest of the left atrium. This was checked circumferentially around the antrums and verified many times. We evaluated the roof line and found that there was complete, bidirectional block. The anterior wall ablated line was evaluated and showed complete bidirectional block, as evidenced by a longer transit time on the medial aspect of the ablated line when compared to the lateral aspect of the ablated line when pacing from the LA appendage. Adenosine bolus of 18mg was also given for each of the 4 pulmonary veins to assess for acute re-connection and to attempt to induce atrial fibrillation. We had adequate adenosine effect (AV blocks of > 3 seconds) and there were no pulmonary fascicles seen in any of the veins nor were there any atrial tachydysrhythmia induced. We then administered dobutamine infusion up to 10 mcg/kg/min in order to achieve at least a 20% increase in heart rate from the basal heart rate to induce any atrial tachydysrhythmia, and there were no atrial tachydysrhythmia induced. We then performed an EP study and programmed stimulation using our CS catheter and ablation cathter to assess the cardiac conduction system and to attempt to induce atrial tachydysrhythmia. The ablation catheter were moved from the left atrium to the left ventricular apex and His bundle position.  His bundle potentials was recorded and pacing and recordings were performed from right atrium, coronary sinus and LV apex with the following results:     Sinus cycle length was 742 msec  RI interval was 142 msec  QRS duration was 89 msec  QT interval was 390 msec  AH interval was 82 msec  HV interval was 35 msec  Pacing from left atrium, 1:1 conduction over AV node with (AV wenckebach) was 350  msec  Pacing from left atrium, AV wagner ERP was 600/220 msec   Pacing from LV apex, 1:1 retrograde conduction over AV node (VA wenckebach) was attempted but showed VA dissociation. Then both the ablation, Pentarray, and CS catheters were removed from the body, and all 3 sheaths were removed from the right femoral vein with a figure-of-eight suture that was placed to provide hemostasis while awaiting the downtrending of the ACT. Protamine 150mg IV x 1 was administered to partially reverse the IV heparin that was administered during the atrial fibrillation ablation procedure. Under intracardiac ultrasound guidance, we evaluated for pericardail effusion, and there was no evidence of such. Specimen collected: none    Estimated blood loss: < 50 cc    The patient tolerated the procedure well and there were no complications. Patient was extubated and transferred to the floor in stable condition. Conclusion:     - Pre- and post-procedure diagnoses were paroxysmal atrial fibrillation, roof-dependent left atrial flutter with  ms with straight up-down activation, anterior wall dependent left atrial flutter with  ms with proximal-distal activation   - Transseptal puncture through an intact septum x 2 under intracardiac ultrasound guidance without fluoroscopic guidance (this attempt took 4-5 times longer than usual due to a relatively aneurysmal fossa ovalis).   - Pulmonary vein isolations using wide area circumferential radiofrequency ablation around R anterior antrum  - Additional ablation with creation of roof line (for roof-dependent left atrial flutter), anterior line from the mitral annulus to the roof (for anterior wall dependent left atrial flutter)  - Ablation of complex fractionated atrial electrogram on the left atrial septal wall (for atrial fibrillation)  - Ablation of complex fractionated atrial electrogram on the anterior wall of the LA (for atrial fibrillation)     Plan:   The patient will be monitored and receive the usual post ablation care. If there are no complications, the patient will be discharged from the hospital either later today or tomorrow with a re-prescription for Flecainide 25mg po BID (very low dose), a re-prescription for Toprol XL 12.5mg po daily, and pre-admission Eliquis 5mg po BID. The patient will follow-up with the EP NP in 3 month's time. Thank you for allowing us to participate in the care of your patient. If you have any questions or concerns, please do not hesitate to contact me.     Rafael Rowland MD, MS, [facility], [facility]  Cardiac Electrophysiology  [address]  [address] [facility], [address] [place] [address]  [phone]

## 2022-04-22 NOTE — ANESTHESIA PRE PROCEDURE
Department of Anesthesiology  Preprocedure Note       Name:  Mario Kitchen   Age:  66 y.o.  :  1950                                          MRN:  [id number]         Date:  2022      Surgeon: * No surgeons listed *    Procedure: * No procedures listed *    Medications prior to admission:   Prior to Admission medications    Medication Sig Start Date End Date Taking? Authorizing Provider   famotidine (PEPCID) 20 MG tablet Take 20 mg by mouth 2 times daily    Historical Provider, MD   apixaban (ELIQUIS) 5 MG TABS tablet Take 1 tablet by mouth 2 times daily 20   Terry Redd MD   Calcium-Magnesium-Vitamin D (CALCIUM 500 PO) Take 500 mg by mouth daily    Historical Provider, MD   calcitonin salmon, (MIACALCIN) 200 UNIT/ACT nasal spray 1 spray by Nasal route daily. Historical Provider, MD       Current medications:    Current Outpatient Medications   Medication Sig Dispense Refill    famotidine (PEPCID) 20 MG tablet Take 20 mg by mouth 2 times daily      apixaban (ELIQUIS) 5 MG TABS tablet Take 1 tablet by mouth 2 times daily 180 tablet 3    Calcium-Magnesium-Vitamin D (CALCIUM 500 PO) Take 500 mg by mouth daily      calcitonin, salmon, (MIACALCIN) 200 UNIT/ACT nasal spray 1 spray by Nasal route daily.        Current Facility-Administered Medications   Medication Dose Route Frequency Provider Last Rate Last Admin    sodium chloride flush 0.9 % injection 5-40 mL  5-40 mL IntraVENous 2 times per day Terry Redd MD        sodium chloride flush 0.9 % injection 5-40 mL  5-40 mL IntraVENous PRN Terry Redd MD        0.9 % sodium chloride infusion   IntraVENous PRN Terry Redd MD        apixaban [facility]) tablet 5 mg  5 mg Oral BID Terry Redd MD        famotidine (PEPCID) tablet 20 mg  20 mg Oral BID Terry Redd MD        flecainide [facility]) tablet 25 mg  25 mg Oral 2 times per day Terry Redd MD        metoprolol succinate (TOPROL XL) extended release tablet 12.5 mg  12.5 mg Oral Daily Sherrell Gerber MD        lidocaine-EPINEPHrine 2 percent-1:[id number] injection 15 mL  15 mL IntraDERmal See Dagoberto Shook MD        protamine injection 30 mg  30 mg IntraVENous Once PRN Sherrell Gerber MD        phenylephrine (PRABHU-SYNEPHRINE) 50 mg in dextrose 5 % 250 mL infusion  100 mcg/min IntraVENous Once PRN Sherrell Gerber MD        0.9 % sodium chloride bolus  1,000 mL IntraVENous Once PRN Sherrell Gerber MD        sodium chloride flush 0.9 % injection 5-40 mL  5-40 mL IntraVENous 2 times per day Sherrell Gerber MD        sodium chloride flush 0.9 % injection 5-40 mL  5-40 mL IntraVENous PRN Sherrell Gerber MD        0.9 % sodium chloride infusion   IntraVENous PRN Sherrell Gerber MD        acetaminophen (TYLENOL) tablet 650 mg  650 mg Oral Q4H PRN Sherrell Gerber MD           Allergies:  No Known Allergies    Problem List:    Patient Active Problem List   Diagnosis Code    Dizziness R42    New onset atrial fibrillation (HCC) I48.91    Near syncope R55    Idiopathic hypotension I95.0    PAF (paroxysmal atrial fibrillation) (HCC) I48.0    S/P ablation of atrial fibrillation Z98.890, Z86.79    Med[employer] LNQ11 Reveal LINQ Z45.09    Left atrial flutter by electrocardiogram ([facility] [address] I48.92       Past Medical History:        Diagnosis Date    Acid reflux     Atrial fibrillation [facility])        Past Surgical History:        Procedure Laterality Date    COLONOSCOPY  11/04/2014    ENDOSCOPY, COLON, DIAGNOSTIC  11/04/2014    with biopsy   [address], Pietro Crespo       Social History:    Social History     Tobacco Use    Smoking status: Never Smoker    Smokeless tobacco: Never Used   Substance Use Topics    Alcohol use:  No                                Counseling given: Not Answered      Vital Signs (Current):   Vitals:    04/22/22 0941 BP: 118/82   Pulse: 80   Resp: 16   Temp: 97.3 °F (36.3 °C)   TempSrc: Infrared   SpO2: 100%   Weight: 117 lb (53.1 kg)   Height: 5' 4\" (1.626 m)                                              BP Readings from Last 3 Encounters:   04/22/22 118/82   03/07/22 102/62   01/21/22 118/64       NPO Status:                                                                                 BMI:   Wt Readings from Last 3 Encounters:   04/22/22 117 lb (53.1 kg)   03/07/22 121 lb (54.9 kg)   01/21/22 121 lb (54.9 kg)     Body mass index is 20.08 kg/m². CBC:   Lab Results   Component Value Date    WBC 4.7 04/18/2022    RBC 4.34 04/18/2022    HGB 12.8 04/18/2022    HCT 38.4 04/18/2022    MCV 88.3 04/18/2022    RDW 13.3 04/18/2022     04/18/2022       CMP:   Lab Results   Component Value Date     04/18/2022    K 5.0 04/18/2022    K 3.9 07/13/2021     04/18/2022    CO2 27 04/18/2022    BUN 11 04/18/2022    CREATININE 1.0 04/18/2022    GFRAA >60 04/18/2022    GFRAA >60 04/04/2012    AGRATIO 1.5 07/12/2021    LABGLOM 54 04/18/2022    GLUCOSE 105 04/18/2022    PROT 6.5 07/12/2021    PROT 7.6 04/04/2012    CALCIUM 10.0 04/18/2022    BILITOT <0.2 07/12/2021    ALKPHOS 85 07/12/2021    AST 20 07/12/2021    ALT 16 07/12/2021       POC Tests: No results for input(s): POCGLU, POCNA, POCK, POCCL, POCBUN, POCHEMO, POCHCT in the last 72 hours.     Coags:   Lab Results   Component Value Date    PROTIME 13.4 04/26/2021    INR 1.15 04/26/2021    APTT 37.6 04/26/2021       HCG (If Applicable): No results found for: PREGTESTUR, PREGSERUM, HCG, HCGQUANT     ABGs: No results found for: PHART, PO2ART, UAH2CBI, DVY7JMJ, BEART, [id number]     Type & Screen (If Applicable):  No results found for: LABABO, LABRH    Drug/Infectious Status (If Applicable):  No results found for: HIV, HEPCAB    COVID-19 Screening (If Applicable):   Lab Results   Component Value Date    COVID19 Not Detected 04/19/2022    COVID19 NOT DETECTED 08/05/2020

## 2022-04-22 NOTE — H&P
Cardiac Electrophysiology Consultation   Date: 4/22/2022  Reason for Consultation: Atrial fibrillation  Consult Requesting Physician: Gema Steele DO  Primary Care Physician: Gema Steele DO     Chief Complaint:        Chief Complaint   Patient presents with    Atrial Fibrillation       follow up / device check - episodes of afib on March 1st and 4th          HPI: Angélica Schneider is a 66 y.o. patient with a history of  syncope, hypotension, pAFIB-new onset 12/16/2016 0400. Converted per cardizem gtt. At that time, she had diarrhea, near syncope, fell. \"woozy and weak feeling. \" CT revealed extensive colitis at that time. She was seen in office on 4/1/2019 to establish care for her atrial fibrillation and medication therapy vs ablation was discussed and she decided to use medication therapy with Flecainide. She presented to office on 2/19/2020 accompanied by her   for follow up for her atrial fibrillation. She reported her blood pressure had been running low reports SBP occasionally in 70's and 80's, most time running high . She reports that she is symptomatic when her blood pressure is low she will feel lightheaded \"woozy\". Ablation was again dicussed as a treatment option for her atrial fibrillation and she decided to deliberate further before making her decision to proceed. She called the office on 2/24/2020 reporting the she was ready to proceed with scheduling. On 8/11/2020 she underwent radiofrequency ablation of atrial fibrillation. She was seen in office on 11/12/2020 by Michelle Larry CNP and EKG showed SR. Flecainide and Toprol XL was stopped and 30 day monitor was placed in order to assess new baseline cardiac rhythm apart from any anti-arrhythmic medications. The monitor was worn from 11/13/2020-12/12/2020 and showed SR with some PVC monomorphic. She underwent implantation of [employer] loop recorder on 7/13/2021.  Device interrogation on 7/22/2021 showed return of atrial fibrillation. She subsequently underwent electrophysiology study with radiofrequency ablation of atrial fibrillation & left atrial flutter on 9/21/2021.     She was seen in office on 12/21/2021 by Di Ruano CNP for her 3 month post ablation follow up and Flecainide and Toprol XL was stopped in order to assess new baseline rhythmn apart from AAD. She was seen in office again on 1/21/2022 and ILR showed a possible atrial flutter on 1/6/22 for 48 minutes. Two episodes of sustained atrial fibrillation on 1/7/2022 and 1/8/2022, longest was 40 minutes. One symptom episode with possible atrial run, the strip ends before the arrhythmia does.      Interval History: Today, she present to office accompanied by her  to discuss treatment options for her afib. EKG shows SR. She states that her last episode lasted hour a couple hours and she noticed tingling his her bilateral lower extremities. She states that she notices when she bends over working in the yard that it will cause her to have episode of atrial fibrillation. She is compliant with her medications and tolerating them well. She denies chest pain/pressure, tightness, edema, shortness of breath, heart racing, palpitations, lightheadedness, dizziness, syncope, presyncope,  PND or orthopnea.      Past Medical History        Past Medical History:   Diagnosis Date    Acid reflux      Atrial fibrillation [facility])              Past Surgical History         Past Surgical History:   Procedure Laterality Date    COLONOSCOPY   11/04/2014    ENDOSCOPY, COLON, DIAGNOSTIC   11/04/2014     with biopsy    TOOTH EXTRACTION   1967     Dr. Carmen Durant Dr, [facility] Dr. Cyndy Tejeda            Allergies:  No Known Allergies     Medication:   Home Medications           Prior to Admission medications    Medication Sig Start Date End Date Taking?  Authorizing Provider famotidine (PEPCID) 20 MG tablet Take 20 mg by mouth 2 times daily     Yes Historical Provider, MD   apixaban (ELIQUIS) 5 MG TABS tablet Take 1 tablet by mouth 2 times daily 2/19/20   Yes Jonathan Walters MD   Calcium-Magnesium-Vitamin D (CALCIUM 500 PO) Take 500 mg by mouth daily     Yes Historical Provider, MD   calcitonin, salmon, (MIACALCIN) 200 UNIT/ACT nasal spray 1 spray by Nasal route daily.     Yes Historical Provider, MD            Social History:   reports that she has never smoked. She has never used smokeless tobacco. She reports that she does not drink alcohol and does not use drugs.         Family History:  family history includes Heart Disease in her brother and father; Other in her father and mother. Reviewed. Denies family history of sudden cardiac death, arrhythmia, premature CAD     Review of System:     · General ROS: negative for - chills, fever   · Psychological ROS: negative for - anxiety or depression  · Ophthalmic ROS: negative for - eye pain or loss of vision  · ENT ROS: negative for - epistaxis, headaches, nasal discharge, sore throat   · Allergy and Immunology ROS: negative for - hives, nasal congestion   · Hematological and Lymphatic ROS: negative for - bleeding problems, blood clots, bruising or jaundice  · Endocrine ROS: negative for - skin changes, temperature intolerance or unexpected weight changes  · Respiratory ROS: negative for - cough, hemoptysis, pleuritic pain, SOB, sputum changes or wheezing  · Cardiovascular ROS: Per HPI.    · Gastrointestinal ROS: negative for - abdominal pain, blood in stools, diarrhea, hematemesis, melena, nausea/vomiting or swallowing difficulty/pain  · Genito-Urinary ROS: negative for - dysuria or incontinence  · Musculoskeletal ROS: negative for - joint swelling or muscle pain  · Neurological ROS: negative for - confusion, dizziness, gait disturbance, headaches, numbness/tingling, seizures, speech problems, tremors, visual changes or weakness  · Dermatological ROS: negative for - rash     Physical Examination:      Vitals:     03/07/22 0846   BP: 102/62   Pulse: 71   SpO2: 97%         · Constitutional: Oriented. No distress. · Head: Normocephalic and atraumatic. · Mouth/Throat: Oropharynx is clear and moist.   · Eyes: Conjunctivae normal. EOM are normal.   · Neck: Normal range of motion. Neck supple. No rigidity. No JVD present. · Cardiovascular: Normal rate, regular rhythm, S1&S2 and intact distal pulses. · Pulmonary/Chest: Bilateral respiratory sounds. No wheezes. No rhonchi. · Abdominal: Soft. Bowel sounds present. No distension, No tenderness. · Musculoskeletal: No tenderness. No edema    · Lymphadenopathy: Has no cervical adenopathy. · Neurological: Alert and oriented. Cranial nerve appears intact, No Gross deficit   · Skin: Skin is warm and dry. No rash noted. · Psychiatric: Has a normal mood, affect and behavior      Labs:  Reviewed.      ECG:  sinus rhythm with v-rate of 75 bpm with QRS duration 84 ms. No pathologic Q waves, ventricular pre-excitation, or QT prolongation.     Studies:   1. Event Monitor 11/13/2020-12/12/2020    SR with some PVC monomorphic.       Event monitor: 1/10/17-2/10/17  Baseline SR   AF with RVR, symptoms correlate with RVR      2. Echo: 12/16/16  Summary   Left ventricle size is normal.   Normal left ventricular wall thickness.   Ejection fraction is visually estimated to be 60%.   Normal right ventricular size and function.   No significant valvular disease.     3. Stress Test:  2/7/2017   Summary    Normal myocardial perfusion study.    Normal LV size and systolic function.  ECG portion of stress test is normal.    Alford score equals 4.    Overall findings represent a low risk scan.          4. Cath: none      I independently reviewed the ECG, MCOT, echocardiogram, stress test, and coronary angiography/PCI results and used them for my plan of care. Procedures:  1.   Electrophysiology study with radiofrequency ablation of atrial fibrillation 8/11/2020.  2. Loop recorder implantation  7/13/2021. 3.Electrophysiology study with radiofrequency ablation of atrial fibrillation and pulmonary vein isolation & Ablation of roof-dependent left atrial flutter with CL 243ms with near proximal-distal activation 9/21/2021.        Assessment/Plan:      Atrial fibrillation, paroxsymal   Left atrial flutter   -s/p ablation of atrial fibrillation on 8/11/2020.  -s/p ablation of atrial fibrillation and left atrial flutter on 9/21/2021.  -She has a [id number] Score 3 (HTN, AGE, female, female gender)  -Continue Eliquis 5 mg BID for  thromboembolic risk reduction.  -Tolerating well no signs or symptoms of abnormal bruising or bleeding.  -Device interrogation today revealed 10 episodes of afib /flutter longest lasting 50 minutes in duration.      We educated the patient that left atrial flutter is a worsening and progressive disease, with more frequent episodes that will ensue. Subsequent episodes usually become more sustained to the extent that many individuals would then develop persistent left atrial flutter. Once persistence is reached, permanent left atrial flutter is inevitable. We also discussed the fact that left atrial flutter is associated with stroke, including life-threatening stroke, and therefore oral anticoagulation is warranted depending on the patient's [id number] score.     We discussed different management options for left atrial flutter including their risks and benefits. These options include use of cardioversion which provides an effective immediate therapy with success rates of 90% or higher, but it provides no short nor long term efficacy.  Anti-arrhythmic medications has been very difficult to control left atrial flutter, both in regards to heart rate and rhythm control even with a powerful anti-arrhythmic medication (amiodarone). Left atrial flutter ablation is a potentially curative therapy with very reasonable success rate.      The risks, benefits and alternatives of the left atrial flutter ablation procedure were discussed with the patient. The risks including, but not limited to, bleeding, infection, radiation exposure, injury to vascular, cardiac and surrounding structures (including pneumothorax), stroke, cardiac perforation, tamponade, need for emergent open heart surgery, need for pacemaker implantation, injury to the phrenic nerve, injury to the esophagus, myocardial infarction and death were discussed in detail. The patient was also counseled at length about the risks of willie Covid-19 in the zachary-operative and post-operative states including the recovery window of their procedure. The patient was made aware that willie Covid-19 after a surgical procedure may worsen their prognosis for recovering from the virus and lend to a higher morbidity and or mortality risk. The patient was given the option of postponing their procedure. The patient was also presented reasonable alternatives to the proposed care, treatment, and services. The discussion I have had with the patient encompassed risks, benefits, and side effects related to the alternatives and the risks related to not receiving the proposed care, treatment and services.      I spent 40 minutes face to face with the patient, with greater than 50% of that time spent in counseling on the above.     The patient opted to proceed with the left atrial flutter ablation. We will schedule for a radiofrequency ablation with [employer] system with a GIANNA procedure immediately prior to this ablation. We will hold Eliquis  for 12 hours prior to procedure.  We will order BMP, CBC, PT/INR, and Type & Screen prior to the procedure.         -Much time was spent counseling the patient on healthier lifestyle, following a low sodium diet <2,400 mg of sodium a day and dramatically decreasing the intake of processed sugar < 26 grams day.     -Patient educated to eat a diet which includes organic fruits, vegetables and meats.     Hypertension, essential   -Stable     Follow up three months after the procedure with Ester Perez CNP.     Thank you for allowing me to participate in the care of Dwane Cogan. All questions and concerns were addressed to the patient/family. Alternatives to my treatment were discussed.      I have reviewed the history and physical and examined the patient and find no relevant changes. I have reviewed with the patient and/or family the risks and benefits to the proposed procedure. The patient was presented with the option of postponing the proposed procedure. The patient was also presented reasonable alternatives to the proposed care, treatment, and services.  The discussion I have had with the patient encompassed risks, benefits, and side effects related to the alternatives and the risks related to not receiving the proposed care, treatment and services.      Erasmo Garcia MD, MS, [address] [address] Electrophysiology  [address], [address] [place] [address]  [facility] [address]  [phone]:

## 2022-04-22 NOTE — ANESTHESIA POSTPROCEDURE EVALUATION
Department of Anesthesiology  Postprocedure Note    Patient: August Dress  MRN: [id number]  YOB: 1950  Date of evaluation: 4/22/2022  Time:  3:40 PM     Procedure Summary     Date: 04/22/22 Room / Location: [facility] [place] Cath Lab    Anesthesia Start: 1054 Anesthesia Stop: 4919    Procedure: GIANNA AFLUTTER ABLATION W/ ANES Diagnosis:     Scheduled Providers:  Responsible Provider: Deb Peralta MD    Anesthesia Type: general ASA Status: 3          Anesthesia Type: general    Evangelina Phase I:      Evangelina Phase II:      Last vitals: Reviewed and per EMR flowsheets.        Anesthesia Post Evaluation    Patient location during evaluation: PACU  Patient participation: complete - patient participated  Level of consciousness: awake and alert  Pain score: 0  Airway patency: patent  Nausea & Vomiting: no nausea and no vomiting  Complications: no  Cardiovascular status: blood pressure returned to baseline  Respiratory status: acceptable  Hydration status: euvolemic

## 2022-05-19 ENCOUNTER — NURSE ONLY (OUTPATIENT)
Dept: CARDIOLOGY CLINIC | Age: 72
End: 2022-05-19
Payer: MEDICARE

## 2022-05-19 DIAGNOSIS — Z45.09 ENCOUNTER FOR ELECTRONIC ANALYSIS OF REVEAL EVENT RECORDER: Chronic | ICD-10-CM

## 2022-05-19 DIAGNOSIS — I48.0 PAF (PAROXYSMAL ATRIAL FIBRILLATION) (HCC): ICD-10-CM

## 2022-05-20 PROCEDURE — G2066 INTER DEVC REMOTE 30D: HCPCS | Performed by: INTERNAL MEDICINE

## 2022-05-20 PROCEDURE — 93298 REM INTERROG DEV EVAL SCRMS: CPT | Performed by: INTERNAL MEDICINE

## 2022-05-20 NOTE — PROGRESS NOTES
ILR for palpitations/suspected AF (h/o atrial fibrillation s/p ablation by Dr. Blanca Castle the recent 1-2 years, most recent AF/L ABL 04.22.22). We received a remote transmission from patient's ILR monitor at home. Since 04.06.22:   17 Symptom, 10 Tachy, 19 AF (1.3% burden) w available ECGs illustrating what appears to be AT/AF/AFL w RVR, as well as some NSR/ST w ectopy. (Pt on Eliquis, flecainide, Toprol)     EP physician to review. We will continue to monitor remotely.

## 2022-06-28 ENCOUNTER — NURSE ONLY (OUTPATIENT)
Dept: CARDIOLOGY CLINIC | Age: 72
End: 2022-06-28
Payer: MEDICARE

## 2022-06-28 DIAGNOSIS — Z86.79 S/P ABLATION OF ATRIAL FIBRILLATION: ICD-10-CM

## 2022-06-28 DIAGNOSIS — Z98.890 S/P ABLATION OF ATRIAL FIBRILLATION: ICD-10-CM

## 2022-06-28 DIAGNOSIS — I48.0 PAF (PAROXYSMAL ATRIAL FIBRILLATION) (HCC): ICD-10-CM

## 2022-06-28 DIAGNOSIS — Z45.09 ENCOUNTER FOR ELECTRONIC ANALYSIS OF REVEAL EVENT RECORDER: Chronic | ICD-10-CM

## 2022-06-28 PROCEDURE — 93298 REM INTERROG DEV EVAL SCRMS: CPT | Performed by: INTERNAL MEDICINE

## 2022-06-28 PROCEDURE — G2066 INTER DEVC REMOTE 30D: HCPCS | Performed by: INTERNAL MEDICINE

## 2022-07-22 ENCOUNTER — TELEPHONE (OUTPATIENT)
Dept: CARDIOLOGY CLINIC | Age: 72
End: 2022-07-22

## 2022-07-22 NOTE — TELEPHONE ENCOUNTER
----- Message from Sofía Lara, BRUNILDA - CNP sent at 7/22/2022 11:28 AM EDT -----  Regarding: appointment  Pt has appointment with me on Monday but she needs to see Dr. Wade Worrell, recurrent A fib seen on ILR. Can you get her moved? Thanks.     Max Saleh

## 2022-07-27 NOTE — TELEPHONE ENCOUNTER
7/22/2022 patient called back spoke with [address] [facility] desk and scheduled to see Dr. Mady Houston in office on 8/1/2022.

## 2022-07-31 NOTE — PROGRESS NOTES
Cardiac Electrophysiology Consultation   Date: 8/1/2022  Reason for Consultation: Atrial fibrillation  Consult Requesting Physician: Rima Sharp DO  Primary Care Physician: Rima Sharp DO    Chief Complaint:   Chief Complaint   Patient presents with    Follow-up     3 m post ablation       HPI: Edmar Grier is a 67 y.o. patient with a history of  syncope, hypotension, pAFIB-new onset 12/16/2016 0400. Converted per cardizem gtt. At that time, she had diarrhea, near syncope, fell. \"woozy and weak feeling. \" CT revealed extensive colitis at that time. She was seen in office on 4/1/2019 to establish care for her atrial fibrillation and medication therapy vs ablation was discussed and she decided to use medication therapy with Flecainide. She presented to office on 2/19/2020 accompanied by her   for follow up for her atrial fibrillation. She reported her blood pressure had been running low reports SBP occasionally in 70's and 80's, most time running high . She reports that she is symptomatic when her blood pressure is low she will feel lightheaded \"woozy\". Ablation was again dicussed as a treatment option for her atrial fibrillation and she decided to deliberate further before making her decision to proceed. She called the office on 2/24/2020 reporting the she was ready to proceed with scheduling. On 8/11/2020 she underwent radiofrequency ablation of atrial fibrillation. She was seen in office on 11/12/2020 by Parveen Biswas CNP and EKG showed SR. Flecainide and Toprol XL was stopped and 30 day monitor was placed in order to assess new baseline cardiac rhythm apart from any anti-arrhythmic medications. The monitor was worn from 11/13/2020-12/12/2020 and showed SR with some PVC monomorphic. She underwent implantation of Medtronic loop recorder on 7/13/2021. Device interrogation on 7/22/2021 showed return of atrial fibrillation.  She subsequently underwent electrophysiology study with radiofrequency ablation of atrial fibrillation & left atrial flutter on 9/21/2021. She was seen in office on 12/21/2021 by Cristiana Milian CNP for her 3 month post ablation follow up and Flecainide and Toprol XL was stopped in order to assess new baseline rhythmn apart from AAD. She was seen in office again on 1/21/2022 and ILR showed a possible atrial flutter on 1/6/22 for 48 minutes. Two episodes of sustained atrial fibrillation on 1/7/2022 and 1/8/2022, longest was 40 minutes. One symptom episode with possible atrial run, the strip ends before the arrhythmia does. She was seen in office on 3/7/2022 accompanied by her  to discuss treatment options for her afib. EKG showed  SR. She states that her last episode lasted hour a couple hours and she noticed tingling his her bilateral lower extremities. She states that she notices when she bends over working in the yard that it will cause her to have episode of atrial fibrillation. Device interrogation revealed 10 episodes of afib /flutter longest lasting 50 minutes in duration. On 4/22/2022 she underwent electrophysiology study with radiofrequency ablation of left atrial flutter and paroxysmal atrial fibrillation and pulmonary vein isolation. She was restarted on Flecainide 25 mg BID and Toprol XL 12.5 mg daily. Interval History: Today, she present to office accompanied by her  for follow up. She states that she will occassionally have intermittent feeling of fluttering which has increased in frequency lately. She denies that her heart feels like it is racing but feels a fluttering sensation that last only 10-15 seconds. She is compliant with her medications and tolerating them well. She denies Chest pain/pressure, tightness, edema, shortness of breath, heart racing, lightheadedness, dizziness, syncope, presyncope,  PND or orthopnea.      Past Medical History:   Diagnosis Date    Acid reflux     Atrial fibrillation (HCC)         Past Surgical History:   Procedure Laterality Date    ABLATION OF DYSRHYTHMIC FOCUS  04/22/2022    COLONOSCOPY  11/04/2014    ENDOSCOPY, COLON, DIAGNOSTIC  11/04/2014    with biopsy    [address] Dr., Dr, Dr.       Allergies:  No Known Allergies    Medication:   Prior to Admission medications    Medication Sig Start Date End Date Taking? Authorizing Provider   flecainide (TAMBOCOR) 50 MG tablet Take 0.5 tablets by mouth every 12 hours 4/22/22  Yes Thomas Dumont MD   metoprolol succinate (TOPROL XL) 25 MG extended release tablet Take 0.5 tablets by mouth daily 4/22/22  Yes Thomas Dumont MD   famotidine (PEPCID) 20 MG tablet Take 20 mg by mouth 2 times daily as needed   Yes Historical Provider, MD   apixaban (ELIQUIS) 5 MG TABS tablet Take 1 tablet by mouth 2 times daily 2/19/20  Yes Thomsa Dumont MD   Calcium-Magnesium-Vitamin D (CALCIUM 500 PO) Take 500 mg by mouth daily   Yes Historical Provider, MD   calcitonin, salmon, (MIACALCIN) 200 UNIT/ACT nasal spray 1 spray by Nasal route daily. Yes Historical Provider, MD       Social History:   reports that she has never smoked. She has never used smokeless tobacco. She reports that she does not drink alcohol and does not use drugs. Family History:  family history includes Heart Disease in her brother and father; Other in her father and mother. Reviewed.  Denies family history of sudden cardiac death, arrhythmia, premature CAD    Review of System:    General ROS: negative for - chills, fever   Psychological ROS: negative for - anxiety or depression  Ophthalmic ROS: negative for - eye pain or loss of vision  ENT ROS: negative for - epistaxis, headaches, nasal discharge, sore throat   Allergy and Immunology ROS: negative for - hives, nasal congestion   Hematological and Lymphatic ROS: negative for - bleeding problems, blood clots, bruising or jaundice  Endocrine ROS: negative for - skin changes, temperature intolerance or unexpected weight changes  Respiratory ROS: negative for - cough, hemoptysis, pleuritic pain, SOB, sputum changes or wheezing  Cardiovascular ROS: Per HPI. Gastrointestinal ROS: negative for - abdominal pain, blood in stools, diarrhea, hematemesis, melena, nausea/vomiting or swallowing difficulty/pain  Genito-Urinary ROS: negative for - dysuria or incontinence  Musculoskeletal ROS: negative for - joint swelling or muscle pain  Neurological ROS: negative for - confusion, dizziness, gait disturbance, headaches, numbness/tingling, seizures, speech problems, tremors, visual changes or weakness  Dermatological ROS: negative for - rash    Physical Examination:  Vitals:    08/01/22 0824   BP: 98/72   Pulse: 73   SpO2: 97%         Constitutional: Oriented. No distress. Head: Normocephalic and atraumatic. Mouth/Throat: Oropharynx is clear and moist.   Eyes: Conjunctivae normal. EOM are normal.   Neck: Normal range of motion. Neck supple. No rigidity. No JVD present. Cardiovascular: Normal rate, regular rhythm, S1&S2 and intact distal pulses. Pulmonary/Chest: Bilateral respiratory sounds. No wheezes. No rhonchi. Abdominal: Soft. Bowel sounds present. No distension, No tenderness. Musculoskeletal: No tenderness. No edema    Lymphadenopathy: Has no cervical adenopathy. Neurological: Alert and oriented. Cranial nerve appears intact, No Gross deficit   Skin: Skin is warm and dry. No rash noted. Psychiatric: Has a normal mood, affect and behavior     Labs:  Reviewed. ECG:  sinus rhythm with v-rate of 63 bpm with QRS duration 90 ms. No pathologic Q waves, ventricular pre-excitation, or QT prolongation. Studies:   1. Event Monitor 11/13/2020-12/12/2020    SR with some PVC monomorphic.       Event monitor: 1/10/2017-2/10/2017  Baseline SR   AF with RVR, symptoms correlate with RVR     2. Echo: 12/16/2016  Summary   Left ventricle size is normal.   Normal left ventricular wall thickness. Ejection fraction is visually estimated to be 60%. Normal right ventricular size and function. No significant valvular disease. 3. Stress Test:  2/7/2017   Summary    Normal myocardial perfusion study. Normal LV size and systolic function. ECG portion of stress test is normal.    Alford score equals 4. Overall findings represent a low risk scan. 4. Cath: none     I independently reviewed the ECG, MCOT, echocardiogram, stress test, and coronary angiography/PCI results and used them for my plan of care. Procedures:  1. Electrophysiology study with radiofrequency ablation of atrial fibrillation 8/11/2020.  2. Loop recorder implantation  7/13/2021. 3.Electrophysiology study with radiofrequency ablation of atrial fibrillation and pulmonary vein isolation & Ablation of roof-dependent left atrial flutter with CL 243ms with near proximal-distal activation 9/21/2021.  4. Electrophysiology study with radiofrequency ablation of left atrial flutter and paroxysmal atrial fibrillation and pulmonary vein isolation 4/22/2022. Assessment/Plan:     Atrial fibrillation, paroxsymal   Left atrial flutter   -s/p ablation of atrial fibrillation on 8/11/2020.  -s/p ablation of atrial fibrillation and left atrial flutter on 9/21/2021.  -s/p ablation of left atrial flutter and paroxysmal atrial fibrillation and pulmonary vein isolation 4/22/2022.    -She has a [id number] Score 3 (HTN, AGE, female, female gender)  -Continue Eliquis 5 mg BID for  thromboembolic risk reduction.  -Tolerating well no signs or symptoms of abnormal bruising or bleeding.    -Device interrogation 3/7/2022 revealed 10 episodes of afib /flutter longest lasting 50 minutes in duration. Stop Flecainide and Toprol XL in order to assess new baseline cardiac rhythm apart from any anti-arrhythmic medications.  She will then follow in 30 day and we will interrogate her device to see if she has had a reoccurrence of the arrhthymias. Hypertension, essential   -Stable  -Continue current medical management. Follow up in 30 day with interrogation of her ILR. Thank you for allowing me to participate in the care of Shira Doe. All questions and concerns were addressed to the patient/family. Alternatives to my treatment were discussed. This note was scribed in the presence of Dr. Maria Teresa Goodrich MD by Norris Calzada RN. The scribe's documentation has been prepared under my direction and personally reviewed by me in its entirety. I confirm that the note above accurately reflects all work, physical examination, the discussion of treatments and procedures, and medical decision making performed by me. Maria Teresa Goodrich MD, MS, [place] [address] Electrophysiology  [address], [address] [place] [address]  [facility] [address]  [phone]:  Reviewed.

## 2022-08-01 ENCOUNTER — OFFICE VISIT (OUTPATIENT)
Dept: CARDIOLOGY CLINIC | Age: 72
End: 2022-08-01
Payer: MEDICARE

## 2022-08-01 ENCOUNTER — NURSE ONLY (OUTPATIENT)
Dept: CARDIOLOGY CLINIC | Age: 72
End: 2022-08-01

## 2022-08-01 VITALS
DIASTOLIC BLOOD PRESSURE: 72 MMHG | SYSTOLIC BLOOD PRESSURE: 98 MMHG | OXYGEN SATURATION: 97 % | BODY MASS INDEX: 37.22 KG/M2 | HEIGHT: 64 IN | WEIGHT: 218 LBS | HEART RATE: 73 BPM

## 2022-08-01 DIAGNOSIS — Z79.01 CURRENT USE OF ANTICOAGULANT THERAPY: ICD-10-CM

## 2022-08-01 DIAGNOSIS — I48.0 PAF (PAROXYSMAL ATRIAL FIBRILLATION) (HCC): Primary | ICD-10-CM

## 2022-08-01 DIAGNOSIS — I48.92 LEFT ATRIAL FLUTTER BY ELECTROCARDIOGRAM (HCC): ICD-10-CM

## 2022-08-01 DIAGNOSIS — I10 ESSENTIAL HYPERTENSION: ICD-10-CM

## 2022-08-01 PROCEDURE — 1090F PRES/ABSN URINE INCON ASSESS: CPT | Performed by: INTERNAL MEDICINE

## 2022-08-01 PROCEDURE — 1036F TOBACCO NON-USER: CPT | Performed by: INTERNAL MEDICINE

## 2022-08-01 PROCEDURE — 99214 OFFICE O/P EST MOD 30 MIN: CPT | Performed by: INTERNAL MEDICINE

## 2022-08-01 PROCEDURE — 3017F COLORECTAL CA SCREEN DOC REV: CPT | Performed by: INTERNAL MEDICINE

## 2022-08-01 PROCEDURE — G8417 CALC BMI ABV UP PARAM F/U: HCPCS | Performed by: INTERNAL MEDICINE

## 2022-08-01 PROCEDURE — G8427 DOCREV CUR MEDS BY ELIG CLIN: HCPCS | Performed by: INTERNAL MEDICINE

## 2022-08-01 PROCEDURE — G8399 PT W/DXA RESULTS DOCUMENT: HCPCS | Performed by: INTERNAL MEDICINE

## 2022-08-01 PROCEDURE — 1123F ACP DISCUSS/DSCN MKR DOCD: CPT | Performed by: INTERNAL MEDICINE

## 2022-08-01 NOTE — RESULT ENCOUNTER NOTE
Device interrogation reviewed. Most, if not all, episodes are sinus with atrial runs. No true atrial fibrillation.

## 2022-08-01 NOTE — PROGRESS NOTES
Jacinda Gilbert 97 transmission received 08.01.22 @ 7:27am from [facility] for patient's ILR. REASON FOR TRANSMISSION  Presence of cardiac device  TECHNICAL FINDINGS  No device or lead performance issue(s) observed  ADDITIONAL NOTES  Arrhythmia episodes detected since last cleared on 24-feb-2022: Based on programmed zones, device detected: 38 Patient activated symptom episodes, most recent on 22-jul-2022 24 Tachy episodes, most recent on 22-apr-2022, 31 sec, 200 bpm 1 Pause episodes, most recent on 22-apr-2022, 5 seconds 48 AF episodes, most recent on 04-may-2022 Battery is ok. Pt seeing WSW today. EP physician will review. See interrogation under cardiology tab in the [address] [facility] [address] field for more details.

## 2022-08-02 ENCOUNTER — NURSE ONLY (OUTPATIENT)
Dept: CARDIOLOGY CLINIC | Age: 72
End: 2022-08-02
Payer: MEDICARE

## 2022-08-02 DIAGNOSIS — Z45.09 ENCOUNTER FOR ELECTRONIC ANALYSIS OF REVEAL EVENT RECORDER: Primary | Chronic | ICD-10-CM

## 2022-08-02 DIAGNOSIS — I48.0 PAF (PAROXYSMAL ATRIAL FIBRILLATION) (HCC): ICD-10-CM

## 2022-08-02 PROCEDURE — 93298 REM INTERROG DEV EVAL SCRMS: CPT | Performed by: INTERNAL MEDICINE

## 2022-08-02 PROCEDURE — G2066 INTER DEVC REMOTE 30D: HCPCS | Performed by: INTERNAL MEDICINE

## 2022-08-02 NOTE — PROGRESS NOTES
ILR for palpitations/suspected AF (h/o atrial fibrillation s/p ablation by Dr. Brittny Fowler in the recent 1-2 years, most recent AF/L ABL 04.22.22). We received a remote transmission from patient's ILR monitor at home. Since 06/29/22:   3 Symptom episodes w available ECGs illustrating what appears to be NSR w ectopy. (Pt on Eliquis)     EP physician to review. We will continue to monitor remotely.     (End of 31-day monitoring period 8/2/22)

## 2022-09-01 ENCOUNTER — OFFICE VISIT (OUTPATIENT)
Dept: CARDIOLOGY CLINIC | Age: 72
End: 2022-09-01
Payer: MEDICARE

## 2022-09-01 ENCOUNTER — NURSE ONLY (OUTPATIENT)
Dept: CARDIOLOGY CLINIC | Age: 72
End: 2022-09-01

## 2022-09-01 VITALS
SYSTOLIC BLOOD PRESSURE: 90 MMHG | BODY MASS INDEX: 19.97 KG/M2 | WEIGHT: 117 LBS | OXYGEN SATURATION: 97 % | HEART RATE: 68 BPM | DIASTOLIC BLOOD PRESSURE: 62 MMHG | HEIGHT: 64 IN

## 2022-09-01 DIAGNOSIS — I10 ESSENTIAL HYPERTENSION: ICD-10-CM

## 2022-09-01 DIAGNOSIS — Z79.01 CURRENT USE OF ANTICOAGULANT THERAPY: ICD-10-CM

## 2022-09-01 DIAGNOSIS — I48.92 LEFT ATRIAL FLUTTER BY ELECTROCARDIOGRAM (HCC): ICD-10-CM

## 2022-09-01 DIAGNOSIS — I48.0 PAF (PAROXYSMAL ATRIAL FIBRILLATION) (HCC): Primary | ICD-10-CM

## 2022-09-01 PROCEDURE — G8427 DOCREV CUR MEDS BY ELIG CLIN: HCPCS | Performed by: INTERNAL MEDICINE

## 2022-09-01 PROCEDURE — G8399 PT W/DXA RESULTS DOCUMENT: HCPCS | Performed by: INTERNAL MEDICINE

## 2022-09-01 PROCEDURE — 3017F COLORECTAL CA SCREEN DOC REV: CPT | Performed by: INTERNAL MEDICINE

## 2022-09-01 PROCEDURE — G8420 CALC BMI NORM PARAMETERS: HCPCS | Performed by: INTERNAL MEDICINE

## 2022-09-01 PROCEDURE — 1036F TOBACCO NON-USER: CPT | Performed by: INTERNAL MEDICINE

## 2022-09-01 PROCEDURE — 1123F ACP DISCUSS/DSCN MKR DOCD: CPT | Performed by: INTERNAL MEDICINE

## 2022-09-01 PROCEDURE — 99214 OFFICE O/P EST MOD 30 MIN: CPT | Performed by: INTERNAL MEDICINE

## 2022-09-01 PROCEDURE — 1090F PRES/ABSN URINE INCON ASSESS: CPT | Performed by: INTERNAL MEDICINE

## 2022-09-01 NOTE — PROGRESS NOTES
Cardiac Electrophysiology Consultation   Date: 9/1/2022  Reason for Consultation: Atrial fibrillation, Atypical atrial flutter  Consult Requesting Physician: Kadyen Kapoor DO  Primary Care Physician: Kayden Kapoor DO    Chief Complaint:   Chief Complaint   Patient presents with    Follow-up     afib       HPI: Anna Carter is a 67 y.o. patient with a history of  syncope, hypotension, pAFIB-new onset 12/16/2016 0400. Converted per cardizem gtt. At that time, she had diarrhea, near syncope, fell. \"woozy and weak feeling. \" CT revealed extensive colitis at that time. She was seen in office on 4/1/2019 to establish care for her atrial fibrillation and medication therapy vs ablation was discussed and she decided to use medication therapy with Flecainide. She presented to office on 2/19/2020 accompanied by her   for follow up for her atrial fibrillation. She reported her blood pressure had been running low reports SBP occasionally in 70's and 80's, most time running high . She reports that she is symptomatic when her blood pressure is low she will feel lightheaded \"woozy\". Ablation was again dicussed as a treatment option for her atrial fibrillation and she decided to deliberate further before making her decision to proceed. She called the office on 2/24/2020 reporting the she was ready to proceed with scheduling. On 8/11/2020 she underwent radiofrequency ablation of atrial fibrillation. She was seen in office on 11/12/2020 by Kay Guillaume CNP and EKG showed SR. Flecainide and Toprol XL was stopped and 30 day monitor was placed in order to assess new baseline cardiac rhythm apart from any anti-arrhythmic medications. The monitor was worn from 11/13/2020-12/12/2020 and showed SR with some PVC monomorphic. She underwent implantation of Medtronic loop recorder on 7/13/2021. Device interrogation on 7/22/2021 showed return of atrial fibrillation.  She subsequently underwent electrophysiology study with radiofrequency ablation of atrial fibrillation & left atrial flutter on 9/21/2021. She was seen in office on 12/21/2021 by Maurice Maguire CNP for her 3 month post ablation follow up and Flecainide and Toprol XL was stopped in order to assess new baseline rhythmn apart from AAD. She was seen in office again on 1/21/2022 and ILR showed a possible atrial flutter on 1/6/22 for 48 minutes. Two episodes of sustained atrial fibrillation on 1/7/2022 and 1/8/2022, longest was 40 minutes. One symptom episode with possible atrial run, the strip ends before the arrhythmia does. She was seen in office on 3/7/2022 accompanied by her  to discuss treatment options for her afib. EKG showed  SR. She states that her last episode lasted hour a couple hours and she noticed tingling his her bilateral lower extremities. She states that she notices when she bends over working in the yard that it will cause her to have episode of atrial fibrillation. Device interrogation revealed 10 episodes of afib /flutter longest lasting 50 minutes in duration. On 4/22/2022 she underwent electrophysiology study with radiofrequency ablation of left atrial flutter and paroxysmal atrial fibrillation and pulmonary vein isolation. She was restarted on Flecainide 25 mg BID and Toprol XL 12.5 mg daily. She was seen in office on 8/12022 accompanied by her  for follow up. She states that she will occassionally have intermittent feeling of fluttering which has increased in frequency lately. She denies that her heart feels like it is racing but feels a fluttering sensation that last only 10-15 seconds. Flecainide and Toprol XL in order to assess new baseline cardiac rhythm apart from any anti-arrhythmic medications. Interval History: Today, she present to office accompanied by her  for follow up for management of atrial fibrillation.   Her device was interrogated today and showed several episodes of atrial runs. She states she has been having very short episodes of palpitations lasting only a few seconds and are not bothersome. EKG today shows SR. She is compliant with her medications and tolerating them well. She denies chest pain/pressure, tightness, edema, shortness of breath, heart racing,  lightheadedness, dizziness, syncope, presyncope,  PND or orthopnea. She is planning  a train rail trip through the [facility] and had symptoms of palpitations at higher elevation. Past Medical History:   Diagnosis Date    Acid reflux     Atrial fibrillation [facility])         Past Surgical History:   Procedure Laterality Date    ABLATION OF DYSRHYTHMIC FOCUS  04/22/2022    COLONOSCOPY  11/04/2014    ENDOSCOPY, COLON, DIAGNOSTIC  11/04/2014    with biopsy    [address] Sharma Dr., Dr, Dr.       Allergies:  No Known Allergies    Medication:   Prior to Admission medications    Medication Sig Start Date End Date Taking? Authorizing Provider   famotidine (PEPCID) 20 MG tablet Take 20 mg by mouth 2 times daily as needed   Yes Historical Provider, MD   apixaban (ELIQUIS) 5 MG TABS tablet Take 1 tablet by mouth 2 times daily 2/19/20  Yes Anjana Marquis MD   Calcium-Magnesium-Vitamin D (CALCIUM 500 PO) Take 500 mg by mouth daily   Yes Historical Provider, MD   calcitonin, salmon, (MIACALCIN) 200 UNIT/ACT nasal spray 1 spray by Nasal route daily. Yes Historical Provider, MD       Social History:   reports that she has never smoked. She has never used smokeless tobacco. She reports that she does not drink alcohol and does not use drugs. Family History:  family history includes Heart Disease in her brother and father; Other in her father and mother. Reviewed.  Denies family history of sudden cardiac death, arrhythmia, premature CAD    Review of System:    General ROS: negative for - chills, fever   Psychological ROS: negative for - anxiety or depression  Ophthalmic ROS: negative for - eye pain or loss of vision  ENT ROS: negative for - epistaxis, headaches, nasal discharge, sore throat   Allergy and Immunology ROS: negative for - hives, nasal congestion   Hematological and Lymphatic ROS: negative for - bleeding problems, blood clots, bruising or jaundice  Endocrine ROS: negative for - skin changes, temperature intolerance or unexpected weight changes  Respiratory ROS: negative for - cough, hemoptysis, pleuritic pain, SOB, sputum changes or wheezing  Cardiovascular ROS: Per HPI. Gastrointestinal ROS: negative for - abdominal pain, blood in stools, diarrhea, hematemesis, melena, nausea/vomiting or swallowing difficulty/pain  Genito-Urinary ROS: negative for - dysuria or incontinence  Musculoskeletal ROS: negative for - joint swelling or muscle pain  Neurological ROS: negative for - confusion, dizziness, gait disturbance, headaches, numbness/tingling, seizures, speech problems, tremors, visual changes or weakness  Dermatological ROS: negative for - rash    Physical Examination:  Vitals:    09/01/22 0839   BP: 90/62   Pulse: 68   SpO2: 97%       Constitutional: Oriented. No distress. Head: Normocephalic and atraumatic. Mouth/Throat: Oropharynx is clear and moist.   Eyes: Conjunctivae normal. EOM are normal.   Neck: Normal range of motion. Neck supple. No rigidity. No JVD present. Cardiovascular: Normal rate, regular rhythm, S1&S2 and intact distal pulses. Pulmonary/Chest: Bilateral respiratory sounds. No wheezes. No rhonchi. Abdominal: Soft. Bowel sounds present. No distension, No tenderness. Musculoskeletal: No tenderness. No edema    Lymphadenopathy: Has no cervical adenopathy. Neurological: Alert and oriented. Cranial nerve appears intact, No Gross deficit   Skin: Skin is warm and dry. No rash noted. Psychiatric: Has a normal mood, affect and behavior     Labs:  Reviewed. ECG:  sinus rhythm occasional PAC with v-rate of 72 bpm with QRS duration 82 ms. No pathologic Q waves, ventricular pre-excitation, or QT prolongation. Studies:   1. Event Monitor 11/13/2020-12/12/2020    SR with some PVC monomorphic. Event monitor: 1/10/2017-2/10/2017  Baseline SR   AF with RVR, symptoms correlate with RVR     2. Echo: 12/16/2016  Summary   Left ventricle size is normal.   Normal left ventricular wall thickness. Ejection fraction is visually estimated to be 60%. Normal right ventricular size and function. No significant valvular disease. 3. Stress Test:  2/7/2017   Summary    Normal myocardial perfusion study. Normal LV size and systolic function. ECG portion of stress test is normal.    Alford score equals 4. Overall findings represent a low risk scan. 4. Cath: none     I independently reviewed the ECG, MCOT, echocardiogram, stress test, and coronary angiography/PCI results and used them for my plan of care. Procedures:  1. Electrophysiology study with radiofrequency ablation of atrial fibrillation 8/11/2020.  2. Loop recorder implantation  7/13/2021. 3.Electrophysiology study with radiofrequency ablation of atrial fibrillation and pulmonary vein isolation & Ablation of roof-dependent left atrial flutter with CL 243ms with near proximal-distal activation 9/21/2021.  4. Electrophysiology study with radiofrequency ablation of left atrial flutter and paroxysmal atrial fibrillation and pulmonary vein isolation 4/22/2022.     Assessment/Plan:     Atrial fibrillation, paroxsymal   Left atrial flutter   -s/p ablation of atrial fibrillation on 8/11/2020.  -s/p ablation of atrial fibrillation and left atrial flutter on 9/21/2021.  -s/p ablation of left atrial flutter and paroxysmal atrial fibrillation and pulmonary vein isolation 4/22/2022.    -She has a [id number] Score 3 (HTN, AGE, GENDER)  -Continue Eliquis 5 mg BID for  thromboembolic risk

## 2022-09-01 NOTE — RESULT ENCOUNTER NOTE
Device interrogation reviewed. Please reprogram the ILR to record any duration of atrial fibrillation.

## 2022-09-01 NOTE — PROGRESS NOTES
ILR for palpitations/suspected AF (h/o atrial fibrillation s/p ablation by Dr. Gita Diaz in the recent 1-2 years, most recent AF/L ABL 04.22.22). Remote transmission from patient's ILR monitor at home. Since 08.01.22, 2 Symptom events recorded. Cardiac Compass shows possible AT/AF events up to 5mins w no ECGs available (set to record >=6min), 0.0% burden. Pt on Eliquis. Pt seeing WSW today. EP physician will review. See interrogation under cardiology tab in the [address] [place] [address] [address] field for more details. Will continue to monitor remotely.

## 2022-09-26 ENCOUNTER — NURSE ONLY (OUTPATIENT)
Dept: CARDIOLOGY CLINIC | Age: 72
End: 2022-09-26
Payer: MEDICARE

## 2022-09-26 DIAGNOSIS — Z86.79 S/P ABLATION OF ATRIAL FIBRILLATION: ICD-10-CM

## 2022-09-26 DIAGNOSIS — Z45.09 ENCOUNTER FOR ELECTRONIC ANALYSIS OF REVEAL EVENT RECORDER: Primary | Chronic | ICD-10-CM

## 2022-09-26 DIAGNOSIS — I48.0 PAF (PAROXYSMAL ATRIAL FIBRILLATION) (HCC): ICD-10-CM

## 2022-09-26 DIAGNOSIS — Z98.890 S/P ABLATION OF ATRIAL FIBRILLATION: ICD-10-CM

## 2022-09-26 PROCEDURE — 93298 REM INTERROG DEV EVAL SCRMS: CPT | Performed by: INTERNAL MEDICINE

## 2022-09-26 PROCEDURE — G2066 INTER DEVC REMOTE 30D: HCPCS | Performed by: INTERNAL MEDICINE

## 2022-10-04 NOTE — PROGRESS NOTES
ILR for palpitations/suspected AF (h/o atrial fibrillation s/p ablation by Dr. Leighton Gallardo in the recent 1-2 years, most recent AF/L ABL 04.22.22). Remote transmission from patient's ILR monitor at home. Since 08.31.22:  3 Symptom events w 2 plots available and 12 Tachy events w 2 ECGs available; appear to be SR/ST w atrial runs per previous notes/ECGs.  6 \"AF\", 0.3% burden, w available ECGs illustrating what appears to be SR w ectopy/brief atrial runs. Cardiac Compass shows possible AT/AF events up to 1hr/day. Pt on Eliquis. End of 31-day monitoring period 9/26/22. EP physician will review. See interrogation under cardiology tab in the [address] [place] [address] [address] field for more details. Will continue to monitor remotely.

## 2022-10-26 ENCOUNTER — TELEPHONE (OUTPATIENT)
Dept: CARDIOLOGY CLINIC | Age: 72
End: 2022-10-26

## 2022-10-26 ENCOUNTER — NURSE ONLY (OUTPATIENT)
Dept: CARDIOLOGY CLINIC | Age: 72
End: 2022-10-26
Payer: MEDICARE

## 2022-10-26 VITALS
BODY MASS INDEX: 20.23 KG/M2 | SYSTOLIC BLOOD PRESSURE: 122 MMHG | HEIGHT: 64 IN | HEART RATE: 70 BPM | WEIGHT: 118.5 LBS | OXYGEN SATURATION: 99 % | DIASTOLIC BLOOD PRESSURE: 76 MMHG

## 2022-10-26 DIAGNOSIS — I48.0 PAF (PAROXYSMAL ATRIAL FIBRILLATION) (HCC): Primary | ICD-10-CM

## 2022-10-26 PROCEDURE — 93000 ELECTROCARDIOGRAM COMPLETE: CPT | Performed by: INTERNAL MEDICINE

## 2022-10-26 RX ORDER — ONDANSETRON HYDROCHLORIDE 8 MG/1
TABLET, FILM COATED ORAL PRN
COMMUNITY
Start: 2022-10-25

## 2022-10-26 RX ORDER — MECLIZINE HYDROCHLORIDE 25 MG/1
TABLET ORAL
COMMUNITY
Start: 2022-10-25

## 2022-10-26 NOTE — TELEPHONE ENCOUNTER
Patient brought in home BP log to office today. Cici COLES requested that is scanned into chart so she can route to Dr. Sina Tuttle.

## 2022-10-26 NOTE — TELEPHONE ENCOUNTER
Dr. Rolando Wilde,    Patient came into office today for EKG at the direction of her PCP. Please review. Patient saw PCP Dr. Ba Steele. for follow up from Urgent Care at [facility] in [facility]. PCP request that she come her for an EKG with Dr. Rolando Wilde. She was prescribed medication for Vertigo (meclizine). BP log is scanned into chart. EKG was reviewed by you and showed  SR with rate variation. I advised to continue following with PCP. Sending as Michael Iglesias. Advise if you have any recommendations.     Thanks,  Keisha Kaur RN

## 2022-10-26 NOTE — PROGRESS NOTES
Patient saw PCP Dr. Adele Osorio. for follow up from Urgent Care at [facility] in [facility]. PCP request that she come her for an EKG with Dr. Shaina Pablo. She was prescribed medication for Vertigo. Patient would like work up thru [employer] if possible. She is still kind of staggering but not so dizzy. Checked BP sitting, standing and laying down.l  Patient would like a copy of EKG faxed to PCP after it is reviewed by Dr. Shaina Pablo. Spoke with Cici COLES. She will route EKG to Dr. Shaina Pablo. Patient requested imaging order to be faxed to Scheduling so Carotid study can be done at [address] to [phone]. Patient took original order and fax confirmation with her today. She will call and schedule procedure [phone] since it was ordered by her PCP Dr. Adele Osorio.

## 2022-10-28 ENCOUNTER — TELEPHONE (OUTPATIENT)
Dept: CARDIOLOGY CLINIC | Age: 72
End: 2022-10-28

## 2022-10-28 NOTE — TELEPHONE ENCOUNTER
Lexy from Dr. Yudy Larry office called this afternoon wanting [facility], NP or Dr. Seamus Urena to look at the loop recorder for Tigist. Tigist was seen in office according to Augustine and stated that Tigist told her that she had a cardiac episode on Saturday, 10/22 at 3 pm. She reported extreme nausea and dizziness and went to the ER.     Augustine states that [facility] would like a f/u after the loop recorder stats to determine what caused the episode in hopes to rule out possible stroke symptoms      Lexy mentioned that Dr. Belcher Crimes can be reached at: [phone]

## 2022-10-31 ENCOUNTER — TELEPHONE (OUTPATIENT)
Dept: CARDIOLOGY CLINIC | Age: 72
End: 2022-10-31

## 2022-10-31 ENCOUNTER — NURSE ONLY (OUTPATIENT)
Dept: CARDIOLOGY CLINIC | Age: 72
End: 2022-10-31
Payer: MEDICARE

## 2022-10-31 DIAGNOSIS — I48.0 PAF (PAROXYSMAL ATRIAL FIBRILLATION) (HCC): ICD-10-CM

## 2022-10-31 DIAGNOSIS — Z98.890 S/P ABLATION OF ATRIAL FIBRILLATION: ICD-10-CM

## 2022-10-31 DIAGNOSIS — Z86.79 S/P ABLATION OF ATRIAL FIBRILLATION: ICD-10-CM

## 2022-10-31 DIAGNOSIS — Z45.09 ENCOUNTER FOR ELECTRONIC ANALYSIS OF REVEAL EVENT RECORDER: Primary | Chronic | ICD-10-CM

## 2022-10-31 DIAGNOSIS — R00.2 PALPITATIONS: ICD-10-CM

## 2022-10-31 PROCEDURE — G2066 INTER DEVC REMOTE 30D: HCPCS | Performed by: INTERNAL MEDICINE

## 2022-10-31 PROCEDURE — 93298 REM INTERROG DEV EVAL SCRMS: CPT | Performed by: INTERNAL MEDICINE

## 2022-10-31 NOTE — TELEPHONE ENCOUNTER
Please see interrogation under cardiology tab in the [address] [place] [address] [address] field for more details. Sent to EP for review.

## 2022-10-31 NOTE — TELEPHONE ENCOUNTER
Lulla London called in this morning, she states she is still having dizzy spells and she saw a neurologist and they said for her to call the office to make sure the loop recorder was reviewed for the times she has been experiencing the dizziness. She can be reached at [phone].

## 2022-10-31 NOTE — PROGRESS NOTES
ILR for palpitations/suspected AF (h/o atrial fibrillation s/p ablation by Dr. Sean Hines in the recent 1-2 years, most recent AF/L ABL 04.22.22). Remote transmission received from patient's ILR monitor at home. Since 09.23.22, 12 Symptom events, 77 Tachy episodes, and 57 AF events, 3.0% AT/AF burden, w available plots and ECGs illustrating what appears to be some SR/ST w atrial runs and RVR as well as some possible true AF/L w RVR. Cardiac Compass shows possible AT/AF events up to 4hrs/day. Pt on Eliquis. End of 31-day monitoring period 10/31/22. EP physician will review. See interrogation under cardiology tab in the [address] [facility] [address] field for more details. Will continue to monitor remotely.

## 2022-10-31 NOTE — RESULT ENCOUNTER NOTE
Device interrogation reviewed. Confirmed to have many episodes of atrial flutter, some of which are 1.5 to 2 hours long in duration. If the last ablation has been longer than 3 months ago, please have patient come to visit me in clinic to discuss these results and to discuss treatment options.

## 2022-10-31 NOTE — TELEPHONE ENCOUNTER
Dr. Maria Esther Boswell responded in result notes. Device interrogation reviewed. Confirmed to have many episodes of atrial flutter, some of which are 1.5 to 2 hours long in duration. If the last ablation has been longer than 3 months ago, please have patient come to visit me in clinic to discuss these results and to discuss treatment options. Attempted to call patient, no answer. Last ablation 4/22/22. Please schedule her an appointment with Dr. Maria Esther Boswell as noted.

## 2022-11-14 ENCOUNTER — NURSE ONLY (OUTPATIENT)
Dept: CARDIOLOGY CLINIC | Age: 72
End: 2022-11-14

## 2022-11-14 ENCOUNTER — OFFICE VISIT (OUTPATIENT)
Dept: CARDIOLOGY CLINIC | Age: 72
End: 2022-11-14
Payer: MEDICARE

## 2022-11-14 VITALS
WEIGHT: 119 LBS | HEIGHT: 64 IN | DIASTOLIC BLOOD PRESSURE: 80 MMHG | OXYGEN SATURATION: 98 % | SYSTOLIC BLOOD PRESSURE: 118 MMHG | BODY MASS INDEX: 20.32 KG/M2 | HEART RATE: 89 BPM

## 2022-11-14 DIAGNOSIS — I48.0 PAF (PAROXYSMAL ATRIAL FIBRILLATION) (HCC): Primary | ICD-10-CM

## 2022-11-14 DIAGNOSIS — Z79.01 CURRENT USE OF ANTICOAGULANT THERAPY: ICD-10-CM

## 2022-11-14 DIAGNOSIS — I48.4 ATYPICAL ATRIAL FLUTTER (HCC): ICD-10-CM

## 2022-11-14 DIAGNOSIS — Z45.09 ENCOUNTER FOR ELECTRONIC ANALYSIS OF REVEAL EVENT RECORDER: ICD-10-CM

## 2022-11-14 DIAGNOSIS — R42 DIZZINESS: ICD-10-CM

## 2022-11-14 PROCEDURE — 99215 OFFICE O/P EST HI 40 MIN: CPT | Performed by: INTERNAL MEDICINE

## 2022-11-14 PROCEDURE — G8420 CALC BMI NORM PARAMETERS: HCPCS | Performed by: INTERNAL MEDICINE

## 2022-11-14 PROCEDURE — 3017F COLORECTAL CA SCREEN DOC REV: CPT | Performed by: INTERNAL MEDICINE

## 2022-11-14 PROCEDURE — G8399 PT W/DXA RESULTS DOCUMENT: HCPCS | Performed by: INTERNAL MEDICINE

## 2022-11-14 PROCEDURE — 1036F TOBACCO NON-USER: CPT | Performed by: INTERNAL MEDICINE

## 2022-11-14 PROCEDURE — G8427 DOCREV CUR MEDS BY ELIG CLIN: HCPCS | Performed by: INTERNAL MEDICINE

## 2022-11-14 PROCEDURE — 1123F ACP DISCUSS/DSCN MKR DOCD: CPT | Performed by: INTERNAL MEDICINE

## 2022-11-14 PROCEDURE — 1090F PRES/ABSN URINE INCON ASSESS: CPT | Performed by: INTERNAL MEDICINE

## 2022-11-14 PROCEDURE — G8484 FLU IMMUNIZE NO ADMIN: HCPCS | Performed by: INTERNAL MEDICINE

## 2022-11-14 NOTE — PATIENT INSTRUCTIONS
If you have any question regarding your ablation or would like to proceed with scheduling please contact Dr. Khushi Buchanan Nurse Eloy Seek at [phone]. Left Atrial Flutter Ablation Pre procedure Instructions    Date:______________________________    Arrive at:__________________________    Procedure time:____________________    The morning of your procedure you will park in the hospital parking lot and report directly to the cath lab to check in. At the information desk stay right and go all the way to the end of the mcconnell, this will take you directly to your check in desk for the cath lab. Pre-Procedure Instructions   You will need to fast (nothing to eat or drink) after midnight the day of your procedure  Do NOT chew gum or eat mints the day of your procedure. You will need to hold your Eliquis for 12 hours prior to the procedure. Do not use any lotions, creams or perfume the morning of procedure. You will need to complete pre-procedure lab work 5-7 days prior to your procedure. Please have a responsible adult to drive you home after procedure, you should go home same day, but there is always a possibility of an overnight stay. Cath lab will provide you with all post procedure instructions  A 3 month follow up will be scheduled with Dr. Khushi Buchanan Nurse practitioner Dima Chauhan CNP post procedure                                        [address] [place] [address]/[place] [address]  Phone: [phone]  The hours are Mon -Fri.   6:30 am - 4:00 pm   Saturday 8:00 am - noon  No appointment necessary

## 2022-11-14 NOTE — PROGRESS NOTES
ILR for palpitations/suspected AF (h/o atrial fibrillation s/p ablation by Dr. Terry Finley in the recent 1-2 years, most recent AF/L ABL 04.22.22). Remote transmission received from patient's ILR monitor at home. Since 10.31.22, 5 Symptom events, 11 Tachy episodes, and 8 AF events, 1.2% AT/AF burden, w available plots and ECGs illustrating what appears to be some SR/ST w atrial runs and RVR as well as some possible true AF/L w RVR. Additional event report noted w Symptom + Detected Tachy Episode on 11.13.22. Cardiac Compass shows possible AT/AF events up to 30mins/day. Pt on Eliquis. Pt scheduled to see WSW today. EP physician will review. See interrogation under cardiology tab in the [address] [facility] [address] field for more details. Will continue to monitor remotely.

## 2022-11-14 NOTE — PROGRESS NOTES
Cardiac Electrophysiology Consultation   Date: 11/14/2022  Reason for Consultation: Atrial fibrillation, Atypical atrial flutter  Consult Requesting Physician: Pedro Hernandez DO  Primary Care Physician: Pedro Hernandez DO    Chief Complaint:   Chief Complaint   Patient presents with    Follow-up     Pt reports achy chest pain yesterday with high HR. No other cardiac symptoms reported. Atrial Fibrillation     HPI: Corinna Lombardo is a 67 y.o. patient with a history of  syncope, hypotension, pAFIB-new onset 12/16/2016 0400. Converted per cardizem gtt. At that time, she had diarrhea, near syncope, fell. \"woozy and weak feeling. \" CT revealed extensive colitis at that time. She was seen in office on 4/1/2019 to establish care for her atrial fibrillation and medication therapy vs ablation was discussed and she decided to use medication therapy with Flecainide. She presented to office on 2/19/2020 accompanied by her   for follow up for her atrial fibrillation. She reported her blood pressure had been running low reports SBP occasionally in 70's and 80's, most time running high . She reports that she is symptomatic when her blood pressure is low she will feel lightheaded \"woozy\". Ablation was again dicussed as a treatment option for her atrial fibrillation and she decided to deliberate further before making her decision to proceed. She called the office on 2/24/2020 reporting the she was ready to proceed with scheduling. On 8/11/2020 she underwent radiofrequency ablation of atrial fibrillation. She was seen in office on 11/12/2020 by Argenis Alfaro CNP and EKG showed SR. Flecainide and Toprol XL was stopped and 30 day monitor was placed in order to assess new baseline cardiac rhythm apart from any anti-arrhythmic medications. The monitor was worn from 11/13/2020-12/12/2020 and showed SR with some PVC monomorphic.   She underwent implantation of [employer] loop recorder on 7/13/2021. Device interrogation on 7/22/2021 showed return of atrial fibrillation. She subsequently underwent electrophysiology study with radiofrequency ablation of atrial fibrillation & left atrial flutter on 9/21/2021. She was seen in office on 12/21/2021 by Dima Chauhan CNP for her 3 month post ablation follow up and Flecainide and Toprol XL was stopped in order to assess new baseline rhythmn apart from AAD. She was seen in office again on 1/21/2022 and ILR showed a possible atrial flutter on 1/6/22 for 48 minutes. Two episodes of sustained atrial fibrillation on 1/7/2022 and 1/8/2022, longest was 40 minutes. One symptom episode with possible atrial run, the strip ends before the arrhythmia does. She was seen in office on 3/7/2022 accompanied by her  to discuss treatment options for her afib. EKG showed  SR. She states that her last episode lasted hour a couple hours and she noticed tingling his her bilateral lower extremities. She states that she notices when she bends over working in the yard that it will cause her to have episode of atrial fibrillation. Device interrogation revealed 10 episodes of afib /flutter longest lasting 50 minutes in duration. On 4/22/2022 she underwent electrophysiology study with radiofrequency ablation of left atrial flutter and paroxysmal atrial fibrillation and pulmonary vein isolation. She was restarted on Flecainide 25 mg BID and Toprol XL 12.5 mg daily. She was seen in office on 8/12022 accompanied by her  for follow up. She states that she will occassionally have intermittent feeling of fluttering which has increased in frequency lately. She denies that her heart feels like it is racing but feels a fluttering sensation that last only 10-15 seconds. Flecainide and Toprol XL in order to assess new baseline cardiac rhythm apart from any anti-arrhythmic medications. She was seen in office on 09/01/2022 and EKG showed SR.  Device interrogation showed several episodes of atrial runs. She states she has been having very short episodes of palpitations lasting only a few seconds and are not bothersome. Remote transmission today showed that since 10.31.22, 5 symptom events, 11 Tachy episodes, and 8 AF events, 1.2% AT/AF burden, w available plots and ECGs illustrating what appears to be some SR/ST w atrial runs and RVR as well as some possible true AF/L w RVR. Additional event report noted w Symptom + Detected Tachy Episode on 11.13.22. Cardiac Compass shows possible AT/AF events up to 30mins/day. Interval History: Today, she present to office accompanied by her  for follow up for management of atrial fibrillation. EKG today shows SR. She is compliant with her medications and tolerating them well. She denies chest pain/pressure, tightness, edema, shortness of breath, heart racing, palpitations, lightheadedness,  syncope, presyncope,  PND or orthopnea. She states she has been having dizziness and had been taking Meclizine. Past Medical History:   Diagnosis Date    Acid reflux     Atrial fibrillation [facility])         Past Surgical History:   Procedure Laterality Date    ABLATION OF DYSRHYTHMIC FOCUS  04/22/2022    COLONOSCOPY  11/04/2014    ENDOSCOPY, COLON, DIAGNOSTIC  11/04/2014    with biopsy    [address]onnie [address] Dr, Yumiko Barrera       Allergies:  No Known Allergies    Medication:   Prior to Admission medications    Medication Sig Start Date End Date Taking? Authorizing Provider   meclizine (ANTIVERT) 25 MG tablet meclizine 25 mg tablet   Take 1 tablet 3 times a day by oral route for 7 days.  10/25/22  Yes Historical Provider, MD   ondansetron (ZOFRAN) 8 MG tablet as needed for Nausea 10/25/22  Yes Historical Provider, MD   famotidine (PEPCID) 20 MG tablet Take 20 mg by mouth daily   Yes Historical Provider, MD apixaban (ELIQUIS) 5 MG TABS tablet Take 1 tablet by mouth 2 times daily 2/19/20  Yes Tutu Cedeno MD   Calcium-Magnesium-Vitamin D (CALCIUM 500 PO) Take 500 mg by mouth daily   Yes Historical Provider, MD   calcitonin, salmon, (MIACALCIN) 200 UNIT/ACT nasal spray 1 spray by Nasal route daily. Yes Historical Provider, MD       Social History:   reports that she has never smoked. She has never used smokeless tobacco. She reports that she does not drink alcohol and does not use drugs. Family History:  family history includes Heart Disease in her brother and father; Other in her father and mother. Reviewed. Denies family history of sudden cardiac death, arrhythmia, premature CAD    Review of System:    General ROS: negative for - chills, fever   Psychological ROS: negative for - anxiety or depression  Ophthalmic ROS: negative for - eye pain or loss of vision  ENT ROS: negative for - epistaxis, headaches, nasal discharge, sore throat   Allergy and Immunology ROS: negative for - hives, nasal congestion   Hematological and Lymphatic ROS: negative for - bleeding problems, blood clots, bruising or jaundice  Endocrine ROS: negative for - skin changes, temperature intolerance or unexpected weight changes  Respiratory ROS: negative for - cough, hemoptysis, pleuritic pain, SOB, sputum changes or wheezing  Cardiovascular ROS: Per HPI.    Gastrointestinal ROS: negative for - abdominal pain, blood in stools, diarrhea, hematemesis, melena, nausea/vomiting or swallowing difficulty/pain  Genito-Urinary ROS: negative for - dysuria or incontinence  Musculoskeletal ROS: negative for - joint swelling or muscle pain  Neurological ROS: negative for - confusion, dizziness, gait disturbance, headaches, numbness/tingling, seizures, speech problems, tremors, visual changes or weakness  Dermatological ROS: negative for - rash    Physical Examination:  Vitals:    11/14/22 1029   BP: 118/80   Pulse: 89   SpO2: 98% Constitutional: Oriented. No distress. Head: Normocephalic and atraumatic. Mouth/Throat: Oropharynx is clear and moist.   Eyes: Conjunctivae normal. EOM are normal.   Neck: Normal range of motion. Neck supple. No rigidity. No JVD present. Cardiovascular: Normal rate, regular rhythm, S1&S2 and intact distal pulses. Pulmonary/Chest: Bilateral respiratory sounds. No wheezes. No rhonchi. Abdominal: Soft. Bowel sounds present. No distension, No tenderness. Musculoskeletal: No tenderness. No edema    Lymphadenopathy: Has no cervical adenopathy. Neurological: Alert and oriented. Cranial nerve appears intact, No Gross deficit   Skin: Skin is warm and dry. No rash noted. Psychiatric: Has a normal mood, affect and behavior     Labs:  Reviewed. ECG:  sinus rhythm with v-rate of 81 bpm with QRS duration 84 ms. No pathologic Q waves, ventricular pre-excitation, or QT prolongation. Studies:   1. Event Monitor 11/13/2020-12/12/2020    SR with some PVC monomorphic. Event monitor: 1/10/2017-2/10/2017  Baseline SR   AF with RVR, symptoms correlate with RVR     2. Echo: 12/16/2016  Summary  Left ventricle size is normal.  Normal left ventricular wall thickness. Ejection fraction is visually estimated to be 60%. Normal right ventricular size and function. No significant valvular disease. 3. Stress Test:  2/7/2017   Summary    Normal myocardial perfusion study. Normal LV size and systolic function. ECG portion of stress test is normal.    Alford score equals 4. Overall findings represent a low risk scan. 4. Cath: none     I independently reviewed the ECG, MCOT, echocardiogram, stress test, and coronary angiography/PCI results and used them for my plan of care. Procedures:  1.  Electrophysiology study with radiofrequency ablation of atrial fibrillation 8/11/2020.  2. Loop recorder implantation  7/13/2021.  3. Electrophysiology study with radiofrequency ablation of atrial fibrillation and pulmonary vein isolation & Ablation of roof-dependent left atrial flutter with CL 243ms with near proximal-distal activation 9/21/2021.  4. Electrophysiology study with radiofrequency ablation of left atrial flutter and paroxysmal atrial fibrillation and pulmonary vein isolation 4/22/2022. Assessment/Plan:     Atrial fibrillation, paroxsymal   Left atrial flutter   -s/p ablation of atrial fibrillation on 8/11/2020.  -s/p ablation of atrial fibrillation and left atrial flutter on 9/21/2021.  -s/p ablation of left atrial flutter and paroxysmal atrial fibrillation and pulmonary vein isolation 4/22/2022.    -She has a [id number] Score 3 (HTN, AGE, GENDER)  -Continue Eliquis 5 mg BID for  thromboembolic risk reduction.  -Tolerating well no signs or symptoms of abnormal bruising or bleeding.    - Device interrogation 3/7/2022 revealed 10 episodes of \"afib /flutter\" longest lasting 50 minutes in duration. But upon closer inspection, it appears to be sinus with atrial runs, as opposed to true Afib/Aflutter. - Device interrogation 9/1/2022 showed  SR with several atrial runs. In a pattern of SR and atrial runs. - Current ILR setting will not record arrhthymias unless they are 6 minutes of longer which is appropriate as shorter episodes of atrial runs do not significantly impact her plan of care. - Remote transmission today showed that since 10./31/2022, 5 symptom events, 11 tachy episodes, and 8 AF events, 1.2% AT/AF burden, w available plots and ECGs illustrating what appears to be some SR/ST w atrial runs and RVR as well as some possible true AF/L w RVR. Additional event report noted w symptom + detected tachy episode on 11.13.22. Cardiac Compass shows possible AT/AF events up to 30mins/day    We educated the patient that left atrial flutter is a worsening and progressive disease, with more frequent episodes that will ensue.  Subsequent episodes usually become more sustained to the extent that many individuals would then develop persistent left atrial flutter. Once persistence is reached, permanent left atrial flutter is inevitable. We also discussed the fact that left atrial flutter is associated with stroke, including life-threatening stroke, and therefore oral anticoagulation is warranted depending on the patient's [id number] score. We discussed different management options for left atrial flutter including their risks and benefits. These options include use of cardioversion which provides an effective immediate therapy with success rates of 90% or higher, but it provides no short nor long term efficacy. Anti-arrhythmic medications has been very difficult to control left atrial flutter, both in regards to heart rate and rhythm control even with a powerful anti-arrhythmic medication (amiodarone). Left atrial flutter ablation is a potentially curative therapy with very reasonable success rate. The risks, benefits and alternatives of the left atrial flutter ablation procedure were discussed with the patient. The risks including, but not limited to, bleeding, infection, radiation exposure, injury to vascular, cardiac and surrounding structures (including pneumothorax), stroke, cardiac perforation, tamponade, need for emergent open heart surgery, need for pacemaker implantation, injury to the phrenic nerve, injury to the esophagus, myocardial infarction and death were discussed in detail. The patient was also counseled at length about the risks of willie Covid-19 in the zachary-operative and post-operative states including the recovery window of their procedure. The patient was made aware that willie Covid-19 after a surgical procedure may worsen their prognosis for recovering from the virus and lend to a higher morbidity and or mortality risk. The patient was given the option of postponing their procedure.  The patient was also presented reasonable alternatives to the proposed care, treatment, and services. The discussion I have had with the patient encompassed risks, benefits, and side effects related to the alternatives and the risks related to not receiving the proposed care, treatment and services. I spent 40 minutes face to face with the patient, with greater than 50% of that time spent in counseling on the above. The patient would like some time to deliberate further regarding procedure. If she wishes to proceed, he will contact our nurse, Eloy Seek at which time we will schedule for a radiofrequency ablation with [employer] system with GIANNA immediately prior to this ablation. We will order BMP, CBC and Type & Screen prior to the procedure. Dizziness  Referred to neurology by her PCP encouraged to follow up. Encouraged to complete Echo. Continue Meclizine PRN. Follow ups: Follow up three months after procedure with Dima Chauhan CNP. Continue remote monitoring of device. Thank you for allowing me to participate in the care of Napoleon Arambula. All questions and concerns were addressed to the patient/family. Alternatives to my treatment were discussed. This note was scribed in the presence of Dr. Eva Jama MD by Dwain Esposito RN. The scribe's documentation has been prepared under my direction and personally reviewed by me in its entirety. I confirm that the note above accurately reflects all work, physical examination, the discussion of treatments and procedures, and medical decision making performed by me. Eva Jama MD, MS, [facility], [place] [address] Electrophysiology  [address]  [address] [facility], [address] [place] [address]  [phone]:  Reviewed.

## 2022-12-12 ENCOUNTER — NURSE ONLY (OUTPATIENT)
Dept: CARDIOLOGY CLINIC | Age: 72
End: 2022-12-12

## 2022-12-12 DIAGNOSIS — I48.0 PAF (PAROXYSMAL ATRIAL FIBRILLATION) (HCC): Primary | ICD-10-CM

## 2022-12-12 DIAGNOSIS — Z45.09 ENCOUNTER FOR ELECTRONIC ANALYSIS OF REVEAL EVENT RECORDER: Chronic | ICD-10-CM

## 2022-12-16 NOTE — PROGRESS NOTES
ILR for palpitations/suspected AF (h/o atrial fibrillation s/p ablation by Dr. Bijan Brown in the recent 1-2 years, most recent AF/L ABL 04.22.22). Remote transmission received from patient's ILR monitor at home. Since 11/15/22, 8 Symptom events, 27 Tachy episodes, and 22 AF events, 1.4% AT/AF burden, w available plots and ECGs illustrating what appears to be SR/ST w atrial runs and RVR as well as some possible true AF/L w RVR. Symptom + Detected AF Episode correlate on 12/6/22. Pt on Eliquis. EP physician will review. See interrogation under cardiology tab in the [address] [facility] [address] field for more details. Will continue to monitor remotely. (End of 31-day monitoring period 12/12/22).

## 2023-02-22 ENCOUNTER — NURSE ONLY (OUTPATIENT)
Dept: CARDIOLOGY CLINIC | Age: 73
End: 2023-02-22

## 2023-02-22 DIAGNOSIS — Z86.79 S/P ABLATION OF ATRIAL FIBRILLATION: ICD-10-CM

## 2023-02-22 DIAGNOSIS — I48.0 PAF (PAROXYSMAL ATRIAL FIBRILLATION) (HCC): ICD-10-CM

## 2023-02-22 DIAGNOSIS — Z98.890 S/P ABLATION OF ATRIAL FIBRILLATION: ICD-10-CM

## 2023-02-22 DIAGNOSIS — Z45.09 ENCOUNTER FOR ELECTRONIC ANALYSIS OF REVEAL EVENT RECORDER: Primary | Chronic | ICD-10-CM

## 2023-02-24 NOTE — PROGRESS NOTES
ILR for palpitations/suspected AF (h/o atrial fibrillation s/p ablation by Dr. Keith Peralta in the recent 1-2 years, most recent AF/L ABL 04.22.22). Remote transmission received from patient's ILR monitor at home. Since 12.11.22, 28 Symptom events, 79 Tachy episodes, and 94 AF events, 1.8% AT/AF burden, w available plots and ECGs illustrating what appears to be some SR/ST w atrial runs and RVR as well as some possible true AF/L w RVR. Cardiac Compass shows possible AT/AF events up to 3.5hrs/day. Pt on Eliquis. Per last OV 11.14.22, pt will contact nurse should she wish to proceed w ABL. End of 31-day monitoring period 2/22/23. EP physician will review. See interrogation under cardiology tab in the [address] [place] [address] [address] field for more details. Will continue to monitor remotely.

## 2023-05-24 ENCOUNTER — NURSE ONLY (OUTPATIENT)
Dept: CARDIOLOGY CLINIC | Age: 73
End: 2023-05-24

## 2023-05-24 DIAGNOSIS — Z95.818 IMPLANTABLE LOOP RECORDER PRESENT: ICD-10-CM

## 2023-05-24 DIAGNOSIS — I48.0 PAF (PAROXYSMAL ATRIAL FIBRILLATION) (HCC): Primary | ICD-10-CM

## 2023-06-02 NOTE — PROGRESS NOTES
ILR for palpitations/suspected AF (h/o atrial fibrillation s/p ablation by Dr. Neil Kinsey in the recent 1-2 years, most recent AF/L ABL 04.22.22). Remote transmission received from patient's ILR monitor at home. Since 4/11/23, 13 Symptom events, 30 Tachy episodes, and 99 AF events, 4.8% AT/AF burden, w available plots and ECGs illustrating what appears to be some SR/ST w atrial runs and RVR as well as some possible true AF/L w RVR at times. Cardiac Compass shows possible AT/AF events up to 9 hrs/day. Pt on Eliquis. Per last OV 11.14.22, pt will contact nurse should she wish to proceed w ABL. End of 31-day monitoring period 5/24/23. EP physician will review. See interrogation under cardiology tab in the [address] [place] [address] [address] field for more details. Will continue to monitor remotely.

## 2023-07-17 ENCOUNTER — NURSE ONLY (OUTPATIENT)
Dept: CARDIOLOGY CLINIC | Age: 73
End: 2023-07-17

## 2023-07-17 DIAGNOSIS — I48.0 PAF (PAROXYSMAL ATRIAL FIBRILLATION) (HCC): ICD-10-CM

## 2023-07-17 DIAGNOSIS — Z86.79 S/P ABLATION OF ATRIAL FIBRILLATION: ICD-10-CM

## 2023-07-17 DIAGNOSIS — Z45.09 ENCOUNTER FOR ELECTRONIC ANALYSIS OF REVEAL EVENT RECORDER: Primary | Chronic | ICD-10-CM

## 2023-07-17 DIAGNOSIS — Z98.890 S/P ABLATION OF ATRIAL FIBRILLATION: ICD-10-CM

## 2023-08-02 NOTE — PROGRESS NOTES
HCT 38.4 2022    MCV 88.3 2022     2022     BMP:   Lab Results   Component Value Date    CREATININE 1.0 2022    BUN 11 2022     2022    K 5.0 2022     2022    CO2 27 2022     CrCl cannot be calculated (Patient's most recent lab result is older than the maximum 180 days allowed. ). No results found for: BNP    Thyroid:   Lab Results   Component Value Date    TSH 2.44 2021     Lipid Panel: No results found for: CHOL, HDL, TRIG  LFTs:  Lab Results   Component Value Date    ALT 16 2021    AST 20 2021    ALKPHOS 85 2021    BILITOT <0.2 2021     Coags:   Lab Results   Component Value Date    PROTIME 13.4 (H) 2021    INR 1.15 (H) 2021    APTT 37.6 (H) 2021       EC/3/2023   SR with atrial run. Non-specific ST-T wave changes. Echo:16   Conclusions      Summary   Left ventricle size is normal.   Normal left ventricular wall thickness. Ejection fraction is visually estimated to be 60%. Normal right ventricular size and function. No significant valvular disease. Stress: 17  Conclusions          Summary     Normal myocardial perfusion study. Normal LV size and systolic function. ECG portion of stress test is normal.     Alford score equals 4. Overall findings represent a low risk scan. Cardiac event monitor: 17  Baseline SR, A fib with RVR which was symptomatic. Procedures:  1. Electrophysiology study with radiofrequency ablation of atrial fibrillation and pulmonary vein isolation, additional ablation with creation of a roof line - 20 Dr. Charis Brownlee  2. ILR implant, 21, Dr. Charis Brownlee  3. Atrial fibrillation (PVI, CAFE) and L atrial flutter (roof line) ablation, 21, Dr. Mustapha Pettit interrogation: 8/3/2023   Battery life \"OK\"  Several AF episodes, some may be sinus with atrial runs.  Longest episode 2 hours and 2 minutes looks like atrial

## 2023-08-03 ENCOUNTER — OFFICE VISIT (OUTPATIENT)
Dept: CARDIOLOGY CLINIC | Age: 73
End: 2023-08-03
Payer: MEDICARE

## 2023-08-03 VITALS
SYSTOLIC BLOOD PRESSURE: 116 MMHG | WEIGHT: 124 LBS | HEIGHT: 64 IN | BODY MASS INDEX: 21.17 KG/M2 | DIASTOLIC BLOOD PRESSURE: 64 MMHG | HEART RATE: 92 BPM

## 2023-08-03 DIAGNOSIS — I48.0 PAF (PAROXYSMAL ATRIAL FIBRILLATION) (HCC): Primary | ICD-10-CM

## 2023-08-03 DIAGNOSIS — Z45.09 ENCOUNTER FOR ELECTRONIC ANALYSIS OF REVEAL EVENT RECORDER: ICD-10-CM

## 2023-08-03 DIAGNOSIS — I48.92 LEFT ATRIAL FLUTTER BY ELECTROCARDIOGRAM (HCC): ICD-10-CM

## 2023-08-03 PROCEDURE — G8399 PT W/DXA RESULTS DOCUMENT: HCPCS | Performed by: NURSE PRACTITIONER

## 2023-08-03 PROCEDURE — 3017F COLORECTAL CA SCREEN DOC REV: CPT | Performed by: NURSE PRACTITIONER

## 2023-08-03 PROCEDURE — 93000 ELECTROCARDIOGRAM COMPLETE: CPT | Performed by: NURSE PRACTITIONER

## 2023-08-03 PROCEDURE — 1036F TOBACCO NON-USER: CPT | Performed by: NURSE PRACTITIONER

## 2023-08-03 PROCEDURE — 99214 OFFICE O/P EST MOD 30 MIN: CPT | Performed by: NURSE PRACTITIONER

## 2023-08-03 PROCEDURE — G8420 CALC BMI NORM PARAMETERS: HCPCS | Performed by: NURSE PRACTITIONER

## 2023-08-03 PROCEDURE — 1090F PRES/ABSN URINE INCON ASSESS: CPT | Performed by: NURSE PRACTITIONER

## 2023-08-03 PROCEDURE — 1123F ACP DISCUSS/DSCN MKR DOCD: CPT | Performed by: NURSE PRACTITIONER

## 2023-08-03 PROCEDURE — G8427 DOCREV CUR MEDS BY ELIG CLIN: HCPCS | Performed by: NURSE PRACTITIONER

## 2023-08-03 RX ORDER — METOPROLOL SUCCINATE 25 MG/1
12.5 TABLET, EXTENDED RELEASE ORAL DAILY
Qty: 15 TABLET | Refills: 5 | Status: SHIPPED | OUTPATIENT
Start: 2023-08-03

## 2023-08-03 ASSESSMENT — ENCOUNTER SYMPTOMS
COLOR CHANGE: 0
VOMITING: 0
COUGH: 0
DIARRHEA: 0
SINUS PRESSURE: 0
ABDOMINAL PAIN: 0
BACK PAIN: 0
TROUBLE SWALLOWING: 0
CONSTIPATION: 0
NAUSEA: 0
WHEEZING: 0
SORE THROAT: 0
BLOOD IN STOOL: 0
SHORTNESS OF BREATH: 1

## 2023-08-31 PROCEDURE — 93298 REM INTERROG DEV EVAL SCRMS: CPT | Performed by: INTERNAL MEDICINE

## 2023-08-31 PROCEDURE — G2066 INTER DEVC REMOTE 30D: HCPCS | Performed by: INTERNAL MEDICINE

## 2023-09-18 ENCOUNTER — HOSPITAL ENCOUNTER (OUTPATIENT)
Dept: GENERAL RADIOLOGY | Age: 73
Discharge: HOME | End: 2023-09-18
Payer: MEDICARE

## 2023-09-18 ENCOUNTER — HOSPITAL ENCOUNTER (OUTPATIENT)
Age: 73
Discharge: HOME | End: 2023-09-18
Payer: MEDICARE

## 2023-09-18 DIAGNOSIS — M25.512 LEFT SHOULDER PAIN, UNSPECIFIED CHRONICITY: ICD-10-CM

## 2023-09-18 DIAGNOSIS — M25.552 LEFT HIP PAIN: ICD-10-CM

## 2023-09-18 PROCEDURE — 73030 X-RAY EXAM OF SHOULDER: CPT

## 2023-09-18 PROCEDURE — 73502 X-RAY EXAM HIP UNI 2-3 VIEWS: CPT

## 2023-10-05 PROCEDURE — G2066 INTER DEVC REMOTE 30D: HCPCS | Performed by: INTERNAL MEDICINE

## 2023-10-05 PROCEDURE — 93298 REM INTERROG DEV EVAL SCRMS: CPT | Performed by: INTERNAL MEDICINE

## 2023-11-02 NOTE — PROGRESS NOTES
[facility], [place], operated by [facility]   Electrophysiology      Date: 11/6/2023    Primary cardiologist: Shilpa Manning MD    Chief Complaint:   Chief Complaint   Patient presents with    Follow-up     PAF (paroxysmal atrial fibrillation)    Other     Patient has no complaints     History of Present Illness:    I saw Felice Willingham in the office for electrophysiology follow up today. She is a 75 y.o. female with a past medical history of paroxysmal atrial fibrillation, syncope and hypotension. She underwent EP study, ablation of atrial fibrillation with pulmonary vein isolation, additional ablation with creation of a roof line on 8/11/20 with Dr. Callie Mata. Her flecainide and Toprol were stopped when she was out past her 90 day blanking period and she wore a 30 day event monitor. Monitor showed sinus with some PVCs. She had near syncope and underwent placement of implantable loop recorder on 7/13/21. She had atrial fibrillation seen on ILR interrogation in July 2021. She underwent repeat atrial fibrillation (PVI, CAFE) and left atrial flutter (roof line) ablation on 9/21/21 with Dr. Callie Mata. Flecainide and Toprol were stopped in December 2021. She had recurrence of atrial fibrillation and atrial flutter noted on ILR in 2022 and discussed possible repeat ablation with Dr. Callie Mata but did not proceed. She had recurrent atrial fibrillation when seen in office in August 2023. We had a long discussion about treatment options at that time. Ultimately decided to start low-dose Toprol to have as needed for at least some rate control. She presents today for follow-up. She does continue have episodes of palpitations that will make her tired and tend to be brought on when she is more active. She typically will relax in the recliner and drink some water. Sometimes will fall asleep and by the time she wakes up her symptoms have resolved. She overall does not feel like they are worse than before.   She will sometimes have some atypical chest

## 2023-11-06 ENCOUNTER — OFFICE VISIT (OUTPATIENT)
Dept: CARDIOLOGY CLINIC | Age: 73
End: 2023-11-06
Payer: MEDICARE

## 2023-11-06 VITALS
WEIGHT: 120.8 LBS | HEIGHT: 64 IN | OXYGEN SATURATION: 99 % | BODY MASS INDEX: 20.62 KG/M2 | DIASTOLIC BLOOD PRESSURE: 62 MMHG | HEART RATE: 76 BPM | SYSTOLIC BLOOD PRESSURE: 90 MMHG

## 2023-11-06 DIAGNOSIS — I48.92 LEFT ATRIAL FLUTTER BY ELECTROCARDIOGRAM (HCC): ICD-10-CM

## 2023-11-06 DIAGNOSIS — I48.0 PAF (PAROXYSMAL ATRIAL FIBRILLATION) (HCC): Primary | ICD-10-CM

## 2023-11-06 PROCEDURE — G8399 PT W/DXA RESULTS DOCUMENT: HCPCS | Performed by: NURSE PRACTITIONER

## 2023-11-06 PROCEDURE — 93000 ELECTROCARDIOGRAM COMPLETE: CPT | Performed by: NURSE PRACTITIONER

## 2023-11-06 PROCEDURE — 3017F COLORECTAL CA SCREEN DOC REV: CPT | Performed by: NURSE PRACTITIONER

## 2023-11-06 PROCEDURE — 1090F PRES/ABSN URINE INCON ASSESS: CPT | Performed by: NURSE PRACTITIONER

## 2023-11-06 PROCEDURE — 99214 OFFICE O/P EST MOD 30 MIN: CPT | Performed by: NURSE PRACTITIONER

## 2023-11-06 PROCEDURE — G8484 FLU IMMUNIZE NO ADMIN: HCPCS | Performed by: NURSE PRACTITIONER

## 2023-11-06 PROCEDURE — 1123F ACP DISCUSS/DSCN MKR DOCD: CPT | Performed by: NURSE PRACTITIONER

## 2023-11-06 PROCEDURE — G8420 CALC BMI NORM PARAMETERS: HCPCS | Performed by: NURSE PRACTITIONER

## 2023-11-06 PROCEDURE — G8427 DOCREV CUR MEDS BY ELIG CLIN: HCPCS | Performed by: NURSE PRACTITIONER

## 2023-11-06 PROCEDURE — 1036F TOBACCO NON-USER: CPT | Performed by: NURSE PRACTITIONER

## 2023-11-06 ASSESSMENT — ENCOUNTER SYMPTOMS
COLOR CHANGE: 0
COUGH: 0
SINUS PRESSURE: 0
WHEEZING: 0
SORE THROAT: 0
DIARRHEA: 0
NAUSEA: 0
SHORTNESS OF BREATH: 0
VOMITING: 0
BLOOD IN STOOL: 0
ABDOMINAL PAIN: 0
CONSTIPATION: 0
TROUBLE SWALLOWING: 0
BACK PAIN: 0

## 2023-11-09 PROCEDURE — 93298 REM INTERROG DEV EVAL SCRMS: CPT | Performed by: INTERNAL MEDICINE

## 2023-11-09 PROCEDURE — G2066 INTER DEVC REMOTE 30D: HCPCS | Performed by: INTERNAL MEDICINE

## 2023-12-14 PROCEDURE — G2066 INTER DEVC REMOTE 30D: HCPCS | Performed by: INTERNAL MEDICINE

## 2023-12-14 PROCEDURE — 93298 REM INTERROG DEV EVAL SCRMS: CPT | Performed by: INTERNAL MEDICINE

## 2024-01-18 PROCEDURE — 93298 REM INTERROG DEV EVAL SCRMS: CPT | Performed by: INTERNAL MEDICINE

## 2024-02-20 ENCOUNTER — HOSPITAL ENCOUNTER (OUTPATIENT)
Age: 74
Setting detail: OBSERVATION
Discharge: HOME | End: 2024-02-21
Attending: EMERGENCY MEDICINE | Admitting: INTERNAL MEDICINE
Payer: MEDICARE

## 2024-02-20 ENCOUNTER — TELEPHONE (OUTPATIENT)
Dept: CARDIOLOGY CLINIC | Age: 74
End: 2024-02-20

## 2024-02-20 ENCOUNTER — APPOINTMENT (OUTPATIENT)
Dept: CT IMAGING | Age: 74
End: 2024-02-20
Payer: MEDICARE

## 2024-02-20 ENCOUNTER — APPOINTMENT (OUTPATIENT)
Dept: GENERAL RADIOLOGY | Age: 74
End: 2024-02-20
Payer: MEDICARE

## 2024-02-20 DIAGNOSIS — R07.9 CHEST PAIN, UNSPECIFIED TYPE: ICD-10-CM

## 2024-02-20 DIAGNOSIS — I48.0 PAROXYSMAL ATRIAL FIBRILLATION (HCC): Primary | ICD-10-CM

## 2024-02-20 LAB
BASOPHILS # BLD: 0 K/UL (ref 0–0.2)
BASOPHILS NFR BLD: 0.2 %
DEPRECATED RDW RBC AUTO: 13.6 % (ref 12.4–15.4)
EOSINOPHIL # BLD: 0 K/UL (ref 0–0.6)
EOSINOPHIL NFR BLD: 0 %
FLUAV RNA UPPER RESP QL NAA+PROBE: NEGATIVE
FLUBV AG NPH QL: NEGATIVE
HCT VFR BLD AUTO: 40.1 % (ref 36–48)
HGB BLD-MCNC: 13.5 G/DL (ref 12–16)
LYMPHOCYTES # BLD: 0.6 K/UL (ref 1–5.1)
LYMPHOCYTES NFR BLD: 11.4 %
MCH RBC QN AUTO: 29.2 PG (ref 26–34)
MCHC RBC AUTO-ENTMCNC: 33.7 G/DL (ref 31–36)
MCV RBC AUTO: 86.8 FL (ref 80–100)
MONOCYTES # BLD: 0.5 K/UL (ref 0–1.3)
MONOCYTES NFR BLD: 9.3 %
NEUTROPHILS # BLD: 4.2 K/UL (ref 1.7–7.7)
NEUTROPHILS NFR BLD: 79.1 %
PLATELET # BLD AUTO: 160 K/UL (ref 135–450)
PMV BLD AUTO: 9.8 FL (ref 5–10.5)
RBC # BLD AUTO: 4.62 M/UL (ref 4–5.2)
SARS-COV-2 RDRP RESP QL NAA+PROBE: NOT DETECTED
WBC # BLD AUTO: 5.3 K/UL (ref 4–11)

## 2024-02-20 PROCEDURE — 80053 COMPREHEN METABOLIC PANEL: CPT

## 2024-02-20 PROCEDURE — 87635 SARS-COV-2 COVID-19 AMP PRB: CPT

## 2024-02-20 PROCEDURE — 85025 COMPLETE CBC W/AUTO DIFF WBC: CPT

## 2024-02-20 PROCEDURE — 83880 ASSAY OF NATRIURETIC PEPTIDE: CPT

## 2024-02-20 PROCEDURE — 99285 EMERGENCY DEPT VISIT HI MDM: CPT

## 2024-02-20 PROCEDURE — 93005 ELECTROCARDIOGRAM TRACING: CPT | Performed by: PHYSICIAN ASSISTANT

## 2024-02-20 PROCEDURE — 71045 X-RAY EXAM CHEST 1 VIEW: CPT

## 2024-02-20 PROCEDURE — 6370000000 HC RX 637 (ALT 250 FOR IP): Performed by: EMERGENCY MEDICINE

## 2024-02-20 PROCEDURE — 6370000000 HC RX 637 (ALT 250 FOR IP): Performed by: PHYSICIAN ASSISTANT

## 2024-02-20 PROCEDURE — 87804 INFLUENZA ASSAY W/OPTIC: CPT

## 2024-02-20 PROCEDURE — 36415 COLL VENOUS BLD VENIPUNCTURE: CPT

## 2024-02-20 PROCEDURE — 84484 ASSAY OF TROPONIN QUANT: CPT

## 2024-02-20 RX ORDER — ASPIRIN 325 MG
325 TABLET ORAL ONCE
Status: COMPLETED | OUTPATIENT
Start: 2024-02-20 | End: 2024-02-20

## 2024-02-20 RX ADMIN — ASPIRIN 325 MG: 325 TABLET ORAL at 23:29

## 2024-02-20 RX ADMIN — METOPROLOL TARTRATE 25 MG: 25 TABLET, FILM COATED ORAL at 23:29

## 2024-02-20 ASSESSMENT — PAIN SCALES - GENERAL: PAINLEVEL_OUTOF10: 0

## 2024-02-20 NOTE — TELEPHONE ENCOUNTER
Received transmission from today. Current EGM appears to be AF. 4 AF episodes, last reported 2/18/24 x 1h 4m max V rate 200bpm. 30 tachy events, last on 2/19/24 x 5s max V rate 207 bpm. 6 symptom recorded events, last on 2/19/24. Reports uploaded in [facility]

## 2024-02-20 NOTE — TELEPHONE ENCOUNTER
Kelsie can you get a check on her device to see if she is in atrial fibrillation or having episodes of atrial fibrillation.

## 2024-02-20 NOTE — TELEPHONE ENCOUNTER
Jerri's pcp office called in to state that Jerri believes she is currently in Afib and has been diagnosed with an upper respiratory infection. Jerri would like to know if she needs to see Petty sooner than 5/7?    Please advise.    Jerri's callback: [phone]

## 2024-02-20 NOTE — TELEPHONE ENCOUNTER
Dr. Petty,    Please review patient device interrogation and advise of any recommendations. She called into the office today reporting that she felt she was in atrial fibrillation symptomatic with palpitation and dyspnea on exertion. She is currently ill with an URI.   She has a history of atrial fibrillation and atypical atrial flutter with two ablations (08/11/2020 and 09/21/2021) and is scheduled to see you in office for follow up in May.   On Toprol XL 12.5 mg daily for rate control & Eliquis.  Previously on Flecainide 25 mg BID post ablations.      I will move patient follow up appointment with you so she can be seen once she has recovered from her URI.    Please advise of any other recommendations.    Thanks,  Cici Cabrera RN

## 2024-02-21 ENCOUNTER — TELEPHONE (OUTPATIENT)
Dept: CARDIOLOGY CLINIC | Age: 74
End: 2024-02-21

## 2024-02-21 ENCOUNTER — APPOINTMENT (OUTPATIENT)
Dept: CT IMAGING | Age: 74
End: 2024-02-21
Payer: MEDICARE

## 2024-02-21 VITALS
HEART RATE: 65 BPM | OXYGEN SATURATION: 96 % | DIASTOLIC BLOOD PRESSURE: 73 MMHG | BODY MASS INDEX: 19.71 KG/M2 | WEIGHT: 114.86 LBS | TEMPERATURE: 97.9 F | SYSTOLIC BLOOD PRESSURE: 104 MMHG | RESPIRATION RATE: 16 BRPM

## 2024-02-21 PROBLEM — K21.9 GASTROESOPHAGEAL REFLUX DISEASE WITHOUT ESOPHAGITIS: Status: ACTIVE | Noted: 2024-02-21

## 2024-02-21 PROBLEM — R07.89 ATYPICAL CHEST PAIN: Status: ACTIVE | Noted: 2024-02-21

## 2024-02-21 LAB
ALBUMIN SERPL-MCNC: 4.5 G/DL (ref 3.4–5)
ALBUMIN/GLOB SERPL: 1.6 {RATIO} (ref 1.1–2.2)
ALP SERPL-CCNC: 82 U/L (ref 40–129)
ALT SERPL-CCNC: 20 U/L (ref 10–40)
ANION GAP SERPL CALCULATED.3IONS-SCNC: 15 MMOL/L (ref 3–16)
AST SERPL-CCNC: 22 U/L (ref 15–37)
BILIRUB SERPL-MCNC: 0.4 MG/DL (ref 0–1)
BUN SERPL-MCNC: 12 MG/DL (ref 7–20)
CALCIUM SERPL-MCNC: 9.5 MG/DL (ref 8.3–10.6)
CHLORIDE SERPL-SCNC: 99 MMOL/L (ref 99–110)
CHOLEST SERPL-MCNC: 157 MG/DL (ref 0–199)
CO2 SERPL-SCNC: 20 MMOL/L (ref 21–32)
CREAT SERPL-MCNC: 0.8 MG/DL (ref 0.6–1.2)
D DIMER: <0.27 UG/ML FEU (ref 0–0.6)
EKG ATRIAL RATE: 102 BPM
EKG DIAGNOSIS: NORMAL
EKG P AXIS: 76 DEGREES
EKG P-R INTERVAL: 152 MS
EKG Q-T INTERVAL: 336 MS
EKG QRS DURATION: 70 MS
EKG QTC CALCULATION (BAZETT): 437 MS
EKG R AXIS: 65 DEGREES
EKG T AXIS: 81 DEGREES
EKG VENTRICULAR RATE: 102 BPM
GFR SERPLBLD CREATININE-BSD FMLA CKD-EPI: >60 ML/MIN/{1.73_M2}
GLUCOSE SERPL-MCNC: 121 MG/DL (ref 70–99)
HDLC SERPL-MCNC: 64 MG/DL (ref 40–60)
LDLC SERPL CALC-MCNC: 77 MG/DL
NT-PROBNP SERPL-MCNC: 440 PG/ML (ref 0–449)
POTASSIUM SERPL-SCNC: 3.6 MMOL/L (ref 3.5–5.1)
PROT SERPL-MCNC: 7.4 G/DL (ref 6.4–8.2)
SODIUM SERPL-SCNC: 134 MMOL/L (ref 136–145)
TRIGL SERPL-MCNC: 79 MG/DL (ref 0–150)
TROPONIN, HIGH SENSITIVITY: 7 NG/L (ref 0–14)
TROPONIN, HIGH SENSITIVITY: 8 NG/L (ref 0–14)
TROPONIN, HIGH SENSITIVITY: <6 NG/L (ref 0–14)
VLDLC SERPL CALC-MCNC: 16 MG/DL

## 2024-02-21 PROCEDURE — 6370000000 HC RX 637 (ALT 250 FOR IP): Performed by: INTERNAL MEDICINE

## 2024-02-21 PROCEDURE — 93010 ELECTROCARDIOGRAM REPORT: CPT | Performed by: INTERNAL MEDICINE

## 2024-02-21 PROCEDURE — 36415 COLL VENOUS BLD VENIPUNCTURE: CPT

## 2024-02-21 PROCEDURE — 71260 CT THORAX DX C+: CPT

## 2024-02-21 PROCEDURE — G0378 HOSPITAL OBSERVATION PER HR: HCPCS

## 2024-02-21 PROCEDURE — 84484 ASSAY OF TROPONIN QUANT: CPT

## 2024-02-21 PROCEDURE — 99222 1ST HOSP IP/OBS MODERATE 55: CPT | Performed by: INTERNAL MEDICINE

## 2024-02-21 PROCEDURE — 85379 FIBRIN DEGRADATION QUANT: CPT

## 2024-02-21 PROCEDURE — 6360000004 HC RX CONTRAST MEDICATION: Performed by: EMERGENCY MEDICINE

## 2024-02-21 PROCEDURE — 80061 LIPID PANEL: CPT

## 2024-02-21 RX ORDER — METOPROLOL SUCCINATE 25 MG/1
12.5 TABLET, EXTENDED RELEASE ORAL DAILY
Qty: 15 TABLET | Refills: 5 | Status: SHIPPED | OUTPATIENT
Start: 2024-02-21

## 2024-02-21 RX ORDER — POLYETHYLENE GLYCOL 3350 17 G/17G
17 POWDER, FOR SOLUTION ORAL DAILY PRN
Status: DISCONTINUED | OUTPATIENT
Start: 2024-02-21 | End: 2024-02-21 | Stop reason: HOSPADM

## 2024-02-21 RX ORDER — MAGNESIUM SULFATE IN WATER 40 MG/ML
2000 INJECTION, SOLUTION INTRAVENOUS PRN
Status: DISCONTINUED | OUTPATIENT
Start: 2024-02-21 | End: 2024-02-21 | Stop reason: HOSPADM

## 2024-02-21 RX ORDER — SODIUM CHLORIDE 9 MG/ML
INJECTION, SOLUTION INTRAVENOUS PRN
Status: DISCONTINUED | OUTPATIENT
Start: 2024-02-21 | End: 2024-02-21 | Stop reason: HOSPADM

## 2024-02-21 RX ORDER — ACETAMINOPHEN 650 MG/1
650 SUPPOSITORY RECTAL EVERY 6 HOURS PRN
Status: DISCONTINUED | OUTPATIENT
Start: 2024-02-21 | End: 2024-02-21 | Stop reason: HOSPADM

## 2024-02-21 RX ORDER — SODIUM CHLORIDE 0.9 % (FLUSH) 0.9 %
5-40 SYRINGE (ML) INJECTION EVERY 12 HOURS SCHEDULED
Status: DISCONTINUED | OUTPATIENT
Start: 2024-02-21 | End: 2024-02-21 | Stop reason: HOSPADM

## 2024-02-21 RX ORDER — FAMOTIDINE 20 MG/1
20 TABLET, FILM COATED ORAL DAILY
Status: DISCONTINUED | OUTPATIENT
Start: 2024-02-21 | End: 2024-02-21 | Stop reason: HOSPADM

## 2024-02-21 RX ORDER — MAGNESIUM HYDROXIDE/ALUMINUM HYDROXICE/SIMETHICONE 120; 1200; 1200 MG/30ML; MG/30ML; MG/30ML
30 SUSPENSION ORAL EVERY 6 HOURS PRN
Status: DISCONTINUED | OUTPATIENT
Start: 2024-02-21 | End: 2024-02-21 | Stop reason: HOSPADM

## 2024-02-21 RX ORDER — NITROGLYCERIN 0.4 MG/1
0.4 TABLET SUBLINGUAL EVERY 5 MIN PRN
Status: DISCONTINUED | OUTPATIENT
Start: 2024-02-21 | End: 2024-02-21 | Stop reason: HOSPADM

## 2024-02-21 RX ORDER — SODIUM CHLORIDE 0.9 % (FLUSH) 0.9 %
5-40 SYRINGE (ML) INJECTION PRN
Status: DISCONTINUED | OUTPATIENT
Start: 2024-02-21 | End: 2024-02-21 | Stop reason: HOSPADM

## 2024-02-21 RX ORDER — POTASSIUM CHLORIDE 7.45 MG/ML
10 INJECTION INTRAVENOUS PRN
Status: DISCONTINUED | OUTPATIENT
Start: 2024-02-21 | End: 2024-02-21 | Stop reason: HOSPADM

## 2024-02-21 RX ORDER — ONDANSETRON 2 MG/ML
4 INJECTION INTRAMUSCULAR; INTRAVENOUS EVERY 6 HOURS PRN
Status: DISCONTINUED | OUTPATIENT
Start: 2024-02-21 | End: 2024-02-21 | Stop reason: HOSPADM

## 2024-02-21 RX ORDER — LOSARTAN POTASSIUM 25 MG/1
25 TABLET ORAL DAILY
Status: DISCONTINUED | OUTPATIENT
Start: 2024-02-21 | End: 2024-02-21 | Stop reason: HOSPADM

## 2024-02-21 RX ORDER — ASPIRIN 81 MG/1
81 TABLET, CHEWABLE ORAL DAILY
Status: DISCONTINUED | OUTPATIENT
Start: 2024-02-22 | End: 2024-02-21 | Stop reason: HOSPADM

## 2024-02-21 RX ORDER — ACETAMINOPHEN 325 MG/1
650 TABLET ORAL EVERY 6 HOURS PRN
Status: DISCONTINUED | OUTPATIENT
Start: 2024-02-21 | End: 2024-02-21 | Stop reason: HOSPADM

## 2024-02-21 RX ORDER — POTASSIUM CHLORIDE 20 MEQ/1
40 TABLET, EXTENDED RELEASE ORAL PRN
Status: DISCONTINUED | OUTPATIENT
Start: 2024-02-21 | End: 2024-02-21 | Stop reason: HOSPADM

## 2024-02-21 RX ORDER — ATORVASTATIN CALCIUM 40 MG/1
40 TABLET, FILM COATED ORAL NIGHTLY
Status: DISCONTINUED | OUTPATIENT
Start: 2024-02-21 | End: 2024-02-21 | Stop reason: HOSPADM

## 2024-02-21 RX ORDER — BENZONATATE 100 MG/1
100 CAPSULE ORAL 3 TIMES DAILY PRN
Qty: 30 CAPSULE | Refills: 0 | Status: SHIPPED | OUTPATIENT
Start: 2024-02-21 | End: 2024-03-02

## 2024-02-21 RX ORDER — CALCITONIN SALMON 200 [IU]/.09ML
1 SPRAY, METERED NASAL DAILY
Status: DISCONTINUED | OUTPATIENT
Start: 2024-02-21 | End: 2024-02-21

## 2024-02-21 RX ORDER — ONDANSETRON 4 MG/1
4 TABLET, ORALLY DISINTEGRATING ORAL EVERY 8 HOURS PRN
Status: DISCONTINUED | OUTPATIENT
Start: 2024-02-21 | End: 2024-02-21 | Stop reason: HOSPADM

## 2024-02-21 RX ADMIN — LOSARTAN POTASSIUM 25 MG: 25 TABLET, FILM COATED ORAL at 09:35

## 2024-02-21 RX ADMIN — IOPAMIDOL 75 ML: 755 INJECTION, SOLUTION INTRAVENOUS at 00:25

## 2024-02-21 RX ADMIN — APIXABAN 5 MG: 5 TABLET, FILM COATED ORAL at 09:35

## 2024-02-21 RX ADMIN — FAMOTIDINE 20 MG: 20 TABLET, FILM COATED ORAL at 09:35

## 2024-02-21 RX ADMIN — METOPROLOL TARTRATE 25 MG: 25 TABLET, FILM COATED ORAL at 09:35

## 2024-02-21 ASSESSMENT — ENCOUNTER SYMPTOMS
COLOR CHANGE: 0
VOMITING: 0
COUGH: 0
CONSTIPATION: 0
TROUBLE SWALLOWING: 0
ABDOMINAL PAIN: 0
WHEEZING: 0
SINUS PRESSURE: 0
NAUSEA: 0
BACK PAIN: 0
SHORTNESS OF BREATH: 0
DIARRHEA: 0
BLOOD IN STOOL: 0
SORE THROAT: 0

## 2024-02-21 NOTE — ED NOTES
Pt took her evening dose (02/20/2024) of eliquis. So therefore she will take her next dose at 09:00 AM 02/21/2024.     Pt states she doesn't take lopressor anymore, for more than probably a year due to her normal soft BP. Her physician stated that if her pressure was less than 90/60 to not take the lopressor.

## 2024-02-21 NOTE — H&P
V2.0  History and Physical      Name:  Jerri Henry /Age/Sex: 1950  (73 y.o. female)   MRN & CSN:  [id number] & [id number] Encounter Date/Time: 2024 1:35 AM EST   Location:  [address] [place] [address] PCP: eVsna Rabago DO       Hospital Day: 2    Assessment and Plan:   Jerri Henry is a 73 y.o. female non-smoker with a pmh of paroxysmal atrial fibrillation status post ablation x 3 on anticoagulation, GERD who presents with Atypical chest pain.    Hospital Problems             Last Modified POA    * (Principal) Atypical chest pain 2024 Yes    Paroxysmal atrial fibrillation (HCC) 2024 Unknown    Gastroesophageal reflux disease without esophagitis 2024 Yes       Plan:  Aspirin, beta-blocker, statin, as needed nitroglycerin  Trend troponins  Cardiology consult for further risk stratification although this could very well be related to RVR and transient endocardial ischemia  Continue home regimen for chronic stable conditions      Disposition:   Current Living situation: Home with   Expected Disposition: Home with   Estimated D/C: 2 days    Diet ADULT DIET; Regular; No Added Salt (3-4 gm); No Caffeine   DVT Prophylaxis [] Lovenox, []  Heparin, [] SCDs, [] Ambulation,  [x] Eliquis, [] Xarelto, [] Coumadin   Code Status Full Code   Surrogate Decision Maker/ POA      Personally reviewed Lab Studies and Imaging     Discussed management of the case with ED provider who recommended admission.    EKG interpreted personally and results none available in epic.    Imaging that was interpreted personally includes chest x-ray and results negative for any acute cardiopulmonary process.    Drugs that require monitoring for toxicity include none and the method of monitoring was n/a.        History from:     patient    History of Present Illness:     Chief Complaint: Chest tightness  Jerri Henry is a 73 y.o. female non-smoker with pmh of paroxysmal atrial fibrillation status post ablation x 3

## 2024-02-21 NOTE — CONSULTS
Cardiac Electrophysiology Consultation   Date: 2/21/2024  Admit Date:  2/20/2024  Reason for Consultation: atrial runs  Consult Requesting Physician: Farhan Salamanca MD     Chief Complaint   Patient presents with    Tachycardia     Pt reports cough \"since Sunday\" and tachycardia that \"started this evening\". Pt endorses past history of atrial fibrillation and states that it \"comes and goes\". Pt alert and oriented at this time with no signs of distress noted. Pt denies any pain. VSS in triage.     HPI: Jerri Henry is a 73 y.o. F h/o paroxysmal atrial fibrillation first diagnosed in 2016 with ablations for atrial fibrillation and L atrial flutter in 2020, 2021, 2022 by myself but with recurrence of atrial fibrillation as seen on ILR interrogation who presents with palpitations and chest discomfort starting overnight. Upon workup, Alisha 7, EKG shows sinus with v-rate 101 bpm with no ST-T wave changes and no pathologic Q waves. CXR and CT chest are unremarkable. . During telemetry monitoring, the patient is noted to have atrial runs but not sustained atrial fibrillation.      Past Medical History:   Diagnosis Date    Acid reflux     Atrial fibrillation (HCC)         Past Surgical History:   Procedure Laterality Date    ABLATION OF DYSRHYTHMIC FOCUS  04/22/2022    COLONOSCOPY  11/04/2014    ENDOSCOPY, COLON, DIAGNOSTIC  11/04/2014    with biopsy    TOOTH EXTRACTION  1967    Dr. Madi Trevino    TUBAL LIGATION Bilateral 1977    Nish Kemal Acevedo    WISDOM TOOTH EXTRACTION  1965    Dr. Keith Trevino       No Known Allergies    Social History:  Reviewed.  reports that she has never smoked. She has never used smokeless tobacco. She reports that she does not drink alcohol and does not use drugs.     Family History:  Reviewed. family history includes Heart Disease in her brother and father; Other in her father and mother.   No premature CAD.     Review of Systems   Constitutional:  Negative for chills,

## 2024-02-21 NOTE — CARE COORDINATION
SW noting a newly admitted patient in the ED. We will arrive at bedside momentarily to assess.     Respectfully submitted,    Myra BIANCHI, NILESH  [facility]   [phone]    Electronically signed by TASH Gordillo, STEFANIW on 2/21/2024 at 10:16 AM

## 2024-02-21 NOTE — CARE COORDINATION
Case Management Assessment  Initial Evaluation    Date/Time of Evaluation: 2/21/2024 12:17 PM  Assessment Completed by: TASH Gordillo, STEFANIW    If patient is discharged prior to next notation, then this note serves as note for discharge by case management.    Patient Name: Jerri Henry                   YOB: 1950  Diagnosis: Atypical chest pain [R07.89]                   Date / Time: 2/20/2024 10:06 PM    Patient Admission Status: Observation   Readmission Risk (Low < 19, Mod (19-27), High > 27): No data recorded  Current PCP: Vesna Rabago, DO  PCP verified by CM? Yes    Chart Reviewed: Yes      History Provided by: Patient  Patient Orientation: Alert and Oriented    Patient Cognition: Alert    Hospitalization in the last 30 days (Readmission):  No    If yes, Readmission Assessment in CM Navigator will be completed.    Advance Directives:      Code Status: Full Code   Patient's Primary Decision Maker is: Legal Next of Kin    Primary Decision Maker: Elmer Henry - Spouse - [phone]    Discharge Planning:    Patient lives with: Spouse/Significant Other Type of Home: House  Primary Care Giver: Self  Patient Support Systems include: Spouse/Significant Other   Current Financial resources: Medicare  Current community resources: None  Current services prior to admission: None            Current DME:              Type of Home Care services:  None    ADLS  Prior functional level: Independent in ADLs/IADLs  Current functional level: Independent in ADLs/IADLs    PT AM-PAC:   /24  OT AM-PAC:   /24    Family can provide assistance at DC: Yes  Would you like Case Management to discuss the discharge plan with any other family members/significant others, and if so, who? Yes ( if needed.)  Plans to Return to Present Housing: Yes  Other Identified Issues/Barriers to RETURNING to current housing: none noted at this time.   Potential Assistance needed at discharge: Other (Comment) (TBD)

## 2024-02-21 NOTE — TELEPHONE ENCOUNTER
1/25/2024 is the last episode of sustained atrial fibrillation lasting 1hr+, as seen on ILR interrogation. The more recent transmissions after 1/25/2024 show atrial runs in the midst of sinus rhythm. Patient has upcoming (no need to change appointment date) clinic visit to discuss these findings.

## 2024-02-21 NOTE — TELEPHONE ENCOUNTER
Jerri called in this afternoon, she states she just got home from the hospital, she states she believes Dr. Petty wants to see her sooner than the 5/7 appointment. Please advise.    She states she is getting ready to run some errands,please leave message on voicemail.      She can be reached at [phone].

## 2024-02-21 NOTE — TELEPHONE ENCOUNTER
Called patient no answer left message advising that Dr. Petty stated that he spoke with her in ED and can keep scheduled follow up but to call me back to discuss further.

## 2024-02-21 NOTE — CARE COORDINATION
Advance Care Planning     Advance Care Planning Activator (Inpatient)  Conversation Note      Date of ACP Conversation: 2/21/2024     Conversation Conducted with: Patient with Decision Making Capacity    ACP Activator: TASH Gordillo, W    Health Care Decision Maker:     Current Designated Health Care Decision Maker:     Primary Decision Maker: Elmer Henry - Spouse - [phone]    Care Preferences    Ventilation:  \"If you were in your present state of health and suddenly became very ill and were unable to breathe on your own, what would your preference be about the use of a ventilator (breathing machine) if it were available to you?\"      Would the patient desire the use of ventilator (breathing machine)?: yes    \"If your health worsens and it becomes clear that your chance of recovery is unlikely, what would your preference be about the use of a ventilator (breathing machine) if it were available to you?\"     Would the patient desire the use of ventilator (breathing machine)?: No      Resuscitation  \"CPR works best to restart the heart when there is a sudden event, like a heart attack, in someone who is otherwise healthy. Unfortunately, CPR does not typically restart the heart for people who have serious health conditions or who are very sick.\"    \"In the event your heart stopped as a result of an underlying serious health condition, would you want attempts to be made to restart your heart (answer \"yes\" for attempt to resuscitate) or would you prefer a natural death (answer \"no\" for do not attempt to resuscitate)?\" yes       [] Yes   [] No   Educated Patient / Decision Maker regarding differences between Advance Directives and portable DNR orders.    Length of ACP Conversation in minutes:  2    Conversation Outcomes:  ACP discussion completed    Follow-up plan:    [] Schedule follow-up conversation to continue planning  [] Referred individual to Provider for additional questions/concerns   [] Advised

## 2024-02-21 NOTE — ED PROVIDER NOTES
I PERSONALLY SAW THE PATIENT AND PERFORMED A SUBSTANTIVE PORTION OF THE VISIT INCLUDING ALL ASPECTS OF THE MEDICAL DECISION MAKING PROCESS.    [facility] EMERGENCY DEPARTMENT  EMERGENCY DEPARTMENT ENCOUNTER      Pt Name: Jerri Henry  MRN: [id number]  Birthdate 1950  Date of evaluation: 2/20/2024  Provider: Mack Isidro MD    CHIEF COMPLAINT       Chief Complaint   Patient presents with    Tachycardia     Pt reports cough \"since Sunday\" and tachycardia that \"started this evening\". Pt endorses past history of atrial fibrillation and states that it \"comes and goes\". Pt alert and oriented at this time with no signs of distress noted. Pt denies any pain. VSS in triage.       HISTORY OF PRESENT ILLNESS    Jerri Henry is a 73 y.o. female who presents to the emergency department with tachycardia and chest pain.  Patient presents with tachycardia and chest pain.  Intermittent tonight.  No history of ACS.  History of A-fib.  On anticoagulation.  No shortness of breath.  No other associated symptoms.    Nursing Notes were reviewed. Including nursing noted for FM, Surgical History, Past Medical History, Social History, vitals, and allergies; agree with all.     REVIEW OF SYSTEMS       Review of Systems    Except as noted above the remainder of the review of systems was reviewed and negative.     PAST MEDICAL HISTORY     Past Medical History:   Diagnosis Date    Acid reflux     Atrial fibrillation (HCC)        SURGICAL HISTORY       Past Surgical History:   Procedure Laterality Date    ABLATION OF DYSRHYTHMIC FOCUS  04/22/2022    COLONOSCOPY  11/04/2014    ENDOSCOPY, COLON, DIAGNOSTIC  11/04/2014    with biopsy    TOOTH EXTRACTION  1967    Dr. Madi Trevino    TUBAL LIGATION Bilateral 1977    Kemal Mock Dr    WISDOM TOOTH EXTRACTION  1965    Dr. Keith Trevino       CURRENT MEDICATIONS       Previous Medications    APIXABAN (ELIQUIS) 5 MG TABS TABLET    Take 1 tablet by mouth 2 times daily

## 2024-02-21 NOTE — TELEPHONE ENCOUNTER
Dr. Petty,    Patient went to the [facility] ED yesterday reporting symptoms of cough and high heart rate.     EKG ST.  Please review EKG tracings from telemetry and advise per ED note Couple runs in the 160s of A-fib converted back to sinus.      Cici Cabrera RN

## 2024-02-21 NOTE — PROGRESS NOTES
Medication Reconciliation    List of medications patient is currently taking is complete.     Source of information: 1. Conversation with patient at bedside                                      2. EPIC records         Notes regarding home medications:   1. Removed metoprolol      Tanmay Tavares RPH   2/21/2024  11:59 AM

## 2024-02-21 NOTE — TELEPHONE ENCOUNTER
I saw the patient in the ED today. No afib to report today. Will see the patient as scheduled in clinic.

## 2024-02-22 NOTE — DISCHARGE SUMMARY
V2.0  Discharge Summary    Name:  Jerri Henry /Age/Sex: 1950 (73 y.o. female)   Admit Date: 2024  Discharge Date: 24    MRN & CSN:  [id number] & [id number] Encounter Date and Time 24 9:39 PM EST    Attending:  No att. providers found Discharging Provider: Farhan Salamanca MD       Hospital Course:     Brief HPI: Jerri Henry is a 73 y.o. female who presented with palpitations chest pain.    Brief Problem Based Course:   Tachycardia: Patient presented with complaints of palpitations and chest tightness.  Symptoms would occur after coughing.  EKG in ED showed sinus tachycardia and troponins were negative.  Patient reportedly had episodes of A-fib with RVR on telemetry in the ED.  Cardiology was consulted and reviewed patient's available telemetry.  They were unable to identify any atrial fibrillation on patient's telemetry.  Patient was recommended to restart her metoprolol and follow-up with cardiology as outpatient.      The patient expressed appropriate understanding of, and agreement with the discharge recommendations, medications, and plan.     Consults this admission:  IP CONSULT TO CARDIOLOGY    Discharge Diagnosis:   Atypical chest pain  Tachycardia    Discharge Instruction:   Follow up appointments:   Primary care physician: Vesna Rabago DO within 2 weeks  Follow-up with cardiology Dr. Petty  Diet: regular diet   Activity: activity as tolerated  Disposition: Discharged to:   [x]Home, [][facility], []SNF, []Acute Rehab, []Hospice   Condition on discharge: Stable  Labs and Tests to be Followed up as an outpatient by PCP or Specialist:     Discharge Medications:        Medication List        START taking these medications      benzonatate 100 MG capsule  Commonly known as: TESSALON  Take 1 capsule by mouth 3 times daily as needed for Cough            CONTINUE taking these medications      apixaban 5 MG Tabs tablet  Commonly known as: Eliquis  Take 1 tablet by mouth 2 times daily

## 2024-04-09 ENCOUNTER — OFFICE VISIT (OUTPATIENT)
Dept: CARDIOLOGY CLINIC | Age: 74
End: 2024-04-09
Payer: MEDICARE

## 2024-04-09 VITALS
SYSTOLIC BLOOD PRESSURE: 114 MMHG | DIASTOLIC BLOOD PRESSURE: 68 MMHG | HEART RATE: 69 BPM | HEIGHT: 64 IN | WEIGHT: 122 LBS | OXYGEN SATURATION: 100 % | BODY MASS INDEX: 20.83 KG/M2

## 2024-04-09 DIAGNOSIS — I48.92 LEFT ATRIAL FLUTTER BY ELECTROCARDIOGRAM (HCC): ICD-10-CM

## 2024-04-09 DIAGNOSIS — I48.0 PAF (PAROXYSMAL ATRIAL FIBRILLATION) (HCC): Primary | ICD-10-CM

## 2024-04-09 DIAGNOSIS — Z95.818 IMPLANTABLE LOOP RECORDER PRESENT: ICD-10-CM

## 2024-04-09 PROCEDURE — 1090F PRES/ABSN URINE INCON ASSESS: CPT | Performed by: INTERNAL MEDICINE

## 2024-04-09 PROCEDURE — G8420 CALC BMI NORM PARAMETERS: HCPCS | Performed by: INTERNAL MEDICINE

## 2024-04-09 PROCEDURE — G8427 DOCREV CUR MEDS BY ELIG CLIN: HCPCS | Performed by: INTERNAL MEDICINE

## 2024-04-09 PROCEDURE — 99214 OFFICE O/P EST MOD 30 MIN: CPT | Performed by: INTERNAL MEDICINE

## 2024-04-09 PROCEDURE — G8399 PT W/DXA RESULTS DOCUMENT: HCPCS | Performed by: INTERNAL MEDICINE

## 2024-04-09 PROCEDURE — 3017F COLORECTAL CA SCREEN DOC REV: CPT | Performed by: INTERNAL MEDICINE

## 2024-04-09 PROCEDURE — 1123F ACP DISCUSS/DSCN MKR DOCD: CPT | Performed by: INTERNAL MEDICINE

## 2024-04-09 PROCEDURE — 93000 ELECTROCARDIOGRAM COMPLETE: CPT | Performed by: INTERNAL MEDICINE

## 2024-04-09 PROCEDURE — 1036F TOBACCO NON-USER: CPT | Performed by: INTERNAL MEDICINE

## 2024-04-09 NOTE — PATIENT INSTRUCTIONS
Stay well hydrated it is recommended that you drink 10-12 (8 oz glasses)  of water daily. Increasing intake on hot days 12-15 (8 oz glasses) of water daily.   Avoid beverages that contain caffeine.    If you would like to get a second opinion, when scheduling ask to see an electrophysiologist.     [facility]  [place], [place] · [phone]    [facility]  [phone]    [facility]  [phone]      Follow up with your Primary Care Physician regarding your low blood pressure.

## 2024-04-09 NOTE — PROGRESS NOTES
But upon closer inspection, it appears to be sinus with atrial runs, as opposed to true Afib/Aflutter.   - Device interrogation 9/01/2022 showed  SR with several atrial runs. In a pattern of SR and atrial runs.  - Current ILR setting will not record arrhthymias unless they are 6 minutes or longer which is appropriate as shorter episodes of atrial runs do not significantly impact her plan of care.     - Remote transmission 11/14/2022 showed that since 10/31/2022, 5 symptom events, 11 tachy episodes, and 8 AF events, 1.2% AT/AF burden, w available plots and ECGs illustrating what appears to be some SR/ST w atrial runs and RVR as well as some possible true AF/L w RVR. Additional event report noted w symptom + detected tachy episode on 11.13.22. Cardiac Compass shows possible AT/AF events up to 30mins/day    - Device interrogation today shows atrial runs, no sustained atrial fibrillation or atrial flutter.     -  Much time was spent counseling the patient on healthier lifestyle, following a low sodium diet <2,400 mg of sodium a day and dramatically decreasing the intake of processed sugar < 24 grams for female and 36 grams male.    - Patient educated to eat a diet which includes organic fruits, vegetables and meats.    Hypotension  - Encouraged to follow up with PCP Vesna Rabago DO for evaluation.  - Instructed to stay well hydrated as dehydration can cause and or exacerbate her symtoms 10-12 (8 oz glasses)  of water daily. Increasing intake on hot days 12-15 (8 oz glasses) of water daily.      Follow ups:  Follow up with Ignacio Jameson MD in one year with in office device check.  Follow up with PCP Vesna Rabago DO  for evaluation of hypotension.  Continue remote monitoring of device.     Thank you for allowing me to participate in the care of Jerri Henry. All questions and concerns were addressed to the patient/family. Alternatives to my treatment were discussed.     This note was scribed in the

## 2024-05-13 ENCOUNTER — TELEPHONE (OUTPATIENT)
Dept: CARDIOLOGY CLINIC | Age: 74
End: 2024-05-13

## 2024-05-13 NOTE — TELEPHONE ENCOUNTER
Last transmission received was on 05/13/24 @ 0005, walked pt through sending a new transmission. Pt states she used her symptom recorder earlier today. Awaiting most recent transmission.

## 2024-05-13 NOTE — TELEPHONE ENCOUNTER
Dr. Jameson, see below.   Kelsie, can you complete a device check.     Reviewed the below status update message below.   The patient is at home with her  currently in a different room. She is just now eating soup as her 1st meal of the day. Her symptoms are improving. She has been resting since that time.     125/63, 67  111/68, 50.       We had a long discussion regarding near syncope, fall, hitting her head and seeking medical attention to further assess. Her  had a procedure and he does not drive. They do not have any resource and do not want to call 911. We again discussed two more time before this call ended. Denies f/u with PCP. Review s/s to monitor and when to see medical attention since she currently repeated declines.     Once the EP team review her remote monitor findings, our office will call her back.

## 2024-05-13 NOTE — TELEPHONE ENCOUNTER
No transmission received. Spoke with pt she states she called [employer] and they are sending her a wifi piece for the handheld to connect to the monitor. We should still receive a report tonight but can not get another report today. Pt states she has no transportation to get here currently. Advised pt if she passes out again or feels she is going to, then call 911. Pt v/u.

## 2024-05-13 NOTE — TELEPHONE ENCOUNTER
Pt called office regarding message below. She states she had a dizzy spell and fell back on her carpet. Pt states she did not loose conscious but did hit her head. Pt states she feels tingling all over her body, feels nauseous, sob, weakness, and feels like she gets flutters. Pt states she think she may have had a \"afib attack\" . Pt states she has been feeling like this for the past 15-20 minutes. Pt took her bp while on the phone again and got 128/94 HR: 70 bpm. Pt denies any vision change, loss of balance, and states the light is not bothering her. Please advise.       A good callback is [phone]

## 2024-05-13 NOTE — TELEPHONE ENCOUNTER
Patient states that she had an episode 15 minutes prior to call. She is wanting someone to review her loop recorder. Patient reports that she passed out and fell in the LR. Jerri states that she stood up, walked 20 steps towards her basement steps and felt dizzy, she stepped back and fell down, she reports hitting her head on the floor. She stated that she feels tingling all over and when asked if she has any chest pain she reports a 4-5 on a scale 1-10.    BP: 130/104  HR: 133    Jerri also mentioned that her BP monitor also showed an irregular heart rate. She mentioned that she is prone to chest pain with her irregular heartbeat.     Transferred call to DIETER Schmidt

## 2024-05-15 ENCOUNTER — APPOINTMENT (OUTPATIENT)
Dept: CT IMAGING | Age: 74
DRG: 316 | End: 2024-05-15
Attending: EMERGENCY MEDICINE
Payer: MEDICARE

## 2024-05-15 ENCOUNTER — APPOINTMENT (OUTPATIENT)
Dept: GENERAL RADIOLOGY | Age: 74
DRG: 316 | End: 2024-05-15
Payer: MEDICARE

## 2024-05-15 ENCOUNTER — HOSPITAL ENCOUNTER (INPATIENT)
Age: 74
LOS: 1 days | Discharge: HOME | DRG: 316 | End: 2024-05-17
Attending: EMERGENCY MEDICINE | Admitting: INTERNAL MEDICINE
Payer: MEDICARE

## 2024-05-15 DIAGNOSIS — R07.9 CHEST PAIN, UNSPECIFIED TYPE: Primary | ICD-10-CM

## 2024-05-15 DIAGNOSIS — I48.0 PAROXYSMAL ATRIAL FIBRILLATION (HCC): ICD-10-CM

## 2024-05-15 DIAGNOSIS — R00.0 TACHYARRHYTHMIA: ICD-10-CM

## 2024-05-15 DIAGNOSIS — R55 SYNCOPE AND COLLAPSE: ICD-10-CM

## 2024-05-15 LAB
ALBUMIN SERPL-MCNC: 4.2 G/DL (ref 3.4–5)
ALBUMIN/GLOB SERPL: 1.4 {RATIO} (ref 1.1–2.2)
ALP SERPL-CCNC: 83 U/L (ref 40–129)
ALT SERPL-CCNC: 13 U/L (ref 10–40)
ANION GAP SERPL CALCULATED.3IONS-SCNC: 11 MMOL/L (ref 3–16)
AST SERPL-CCNC: 18 U/L (ref 15–37)
BASOPHILS # BLD: 0 K/UL (ref 0–0.2)
BASOPHILS NFR BLD: 0.6 %
BILIRUB SERPL-MCNC: 0.4 MG/DL (ref 0–1)
BUN SERPL-MCNC: 13 MG/DL (ref 7–20)
CALCIUM SERPL-MCNC: 9.5 MG/DL (ref 8.3–10.6)
CHLORIDE SERPL-SCNC: 108 MMOL/L (ref 99–110)
CO2 SERPL-SCNC: 23 MMOL/L (ref 21–32)
CREAT SERPL-MCNC: 0.9 MG/DL (ref 0.6–1.2)
DEPRECATED RDW RBC AUTO: 13.1 % (ref 12.4–15.4)
EOSINOPHIL # BLD: 0.1 K/UL (ref 0–0.6)
EOSINOPHIL NFR BLD: 1.4 %
GFR SERPLBLD CREATININE-BSD FMLA CKD-EPI: 67 ML/MIN/{1.73_M2}
GLUCOSE SERPL-MCNC: 104 MG/DL (ref 70–99)
HCT VFR BLD AUTO: 40.2 % (ref 36–48)
HGB BLD-MCNC: 13.8 G/DL (ref 12–16)
LYMPHOCYTES # BLD: 1.1 K/UL (ref 1–5.1)
LYMPHOCYTES NFR BLD: 17 %
MCH RBC QN AUTO: 29.8 PG (ref 26–34)
MCHC RBC AUTO-ENTMCNC: 34.3 G/DL (ref 31–36)
MCV RBC AUTO: 86.9 FL (ref 80–100)
MONOCYTES # BLD: 0.5 K/UL (ref 0–1.3)
MONOCYTES NFR BLD: 7.1 %
NEUTROPHILS # BLD: 4.8 K/UL (ref 1.7–7.7)
NEUTROPHILS NFR BLD: 73.9 %
NT-PROBNP SERPL-MCNC: 663 PG/ML (ref 0–449)
PLATELET # BLD AUTO: 187 K/UL (ref 135–450)
PMV BLD AUTO: 9.8 FL (ref 5–10.5)
POTASSIUM SERPL-SCNC: 3.7 MMOL/L (ref 3.5–5.1)
POTASSIUM SERPL-SCNC: 4 MMOL/L (ref 3.5–5.1)
PROT SERPL-MCNC: 7.1 G/DL (ref 6.4–8.2)
RBC # BLD AUTO: 4.63 M/UL (ref 4–5.2)
SODIUM SERPL-SCNC: 142 MMOL/L (ref 136–145)
TROPONIN, HIGH SENSITIVITY: 6 NG/L (ref 0–14)
TROPONIN, HIGH SENSITIVITY: 6 NG/L (ref 0–14)
WBC # BLD AUTO: 6.5 K/UL (ref 4–11)

## 2024-05-15 PROCEDURE — 84132 ASSAY OF SERUM POTASSIUM: CPT

## 2024-05-15 PROCEDURE — 71045 X-RAY EXAM CHEST 1 VIEW: CPT

## 2024-05-15 PROCEDURE — 96360 HYDRATION IV INFUSION INIT: CPT

## 2024-05-15 PROCEDURE — G0378 HOSPITAL OBSERVATION PER HR: HCPCS

## 2024-05-15 PROCEDURE — 80053 COMPREHEN METABOLIC PANEL: CPT

## 2024-05-15 PROCEDURE — 83880 ASSAY OF NATRIURETIC PEPTIDE: CPT

## 2024-05-15 PROCEDURE — 70450 CT HEAD/BRAIN W/O DYE: CPT

## 2024-05-15 PROCEDURE — 84484 ASSAY OF TROPONIN QUANT: CPT

## 2024-05-15 PROCEDURE — 85025 COMPLETE CBC W/AUTO DIFF WBC: CPT

## 2024-05-15 PROCEDURE — 93005 ELECTROCARDIOGRAM TRACING: CPT | Performed by: EMERGENCY MEDICINE

## 2024-05-15 PROCEDURE — 2580000003 HC RX 258: Performed by: EMERGENCY MEDICINE

## 2024-05-15 PROCEDURE — 2580000003 HC RX 258: Performed by: HOSPITALIST

## 2024-05-15 PROCEDURE — 6370000000 HC RX 637 (ALT 250 FOR IP): Performed by: HOSPITALIST

## 2024-05-15 PROCEDURE — 36415 COLL VENOUS BLD VENIPUNCTURE: CPT

## 2024-05-15 PROCEDURE — 99285 EMERGENCY DEPT VISIT HI MDM: CPT

## 2024-05-15 RX ORDER — NICOTINE POLACRILEX 2 MG
5000 LOZENGE BUCCAL DAILY
COMMUNITY

## 2024-05-15 RX ORDER — ACETAMINOPHEN 650 MG/1
650 SUPPOSITORY RECTAL EVERY 6 HOURS PRN
Status: DISCONTINUED | OUTPATIENT
Start: 2024-05-15 | End: 2024-05-17 | Stop reason: HOSPADM

## 2024-05-15 RX ORDER — ONDANSETRON 2 MG/ML
4 INJECTION INTRAMUSCULAR; INTRAVENOUS EVERY 6 HOURS PRN
Status: DISCONTINUED | OUTPATIENT
Start: 2024-05-15 | End: 2024-05-17 | Stop reason: HOSPADM

## 2024-05-15 RX ORDER — SODIUM CHLORIDE 0.9 % (FLUSH) 0.9 %
5-40 SYRINGE (ML) INJECTION EVERY 12 HOURS SCHEDULED
Status: DISCONTINUED | OUTPATIENT
Start: 2024-05-15 | End: 2024-05-17 | Stop reason: HOSPADM

## 2024-05-15 RX ORDER — POTASSIUM CHLORIDE 20 MEQ/1
40 TABLET, EXTENDED RELEASE ORAL PRN
Status: DISCONTINUED | OUTPATIENT
Start: 2024-05-15 | End: 2024-05-17 | Stop reason: HOSPADM

## 2024-05-15 RX ORDER — MAGNESIUM SULFATE IN WATER 40 MG/ML
2000 INJECTION, SOLUTION INTRAVENOUS PRN
Status: DISCONTINUED | OUTPATIENT
Start: 2024-05-15 | End: 2024-05-17 | Stop reason: HOSPADM

## 2024-05-15 RX ORDER — POLYETHYLENE GLYCOL 3350 17 G/17G
17 POWDER, FOR SOLUTION ORAL DAILY PRN
Status: DISCONTINUED | OUTPATIENT
Start: 2024-05-15 | End: 2024-05-17 | Stop reason: HOSPADM

## 2024-05-15 RX ORDER — CALCITONIN SALMON 200 [IU]/.09ML
1 SPRAY, METERED NASAL DAILY
Status: DISCONTINUED | OUTPATIENT
Start: 2024-05-16 | End: 2024-05-15 | Stop reason: RX

## 2024-05-15 RX ORDER — FAMOTIDINE 20 MG/1
20 TABLET, FILM COATED ORAL 2 TIMES DAILY
Status: DISCONTINUED | OUTPATIENT
Start: 2024-05-15 | End: 2024-05-15 | Stop reason: DRUGHIGH

## 2024-05-15 RX ORDER — FAMOTIDINE 20 MG/1
20 TABLET, FILM COATED ORAL DAILY
Status: DISCONTINUED | OUTPATIENT
Start: 2024-05-15 | End: 2024-05-17 | Stop reason: HOSPADM

## 2024-05-15 RX ORDER — ONDANSETRON 4 MG/1
4 TABLET, ORALLY DISINTEGRATING ORAL EVERY 8 HOURS PRN
Status: DISCONTINUED | OUTPATIENT
Start: 2024-05-15 | End: 2024-05-17 | Stop reason: HOSPADM

## 2024-05-15 RX ORDER — SODIUM CHLORIDE 9 MG/ML
INJECTION, SOLUTION INTRAVENOUS PRN
Status: DISCONTINUED | OUTPATIENT
Start: 2024-05-15 | End: 2024-05-17 | Stop reason: HOSPADM

## 2024-05-15 RX ORDER — SODIUM CHLORIDE 0.9 % (FLUSH) 0.9 %
5-40 SYRINGE (ML) INJECTION PRN
Status: DISCONTINUED | OUTPATIENT
Start: 2024-05-15 | End: 2024-05-17 | Stop reason: HOSPADM

## 2024-05-15 RX ORDER — 0.9 % SODIUM CHLORIDE 0.9 %
1000 INTRAVENOUS SOLUTION INTRAVENOUS ONCE
Status: COMPLETED | OUTPATIENT
Start: 2024-05-15 | End: 2024-05-15

## 2024-05-15 RX ORDER — LANOLIN ALCOHOL/MO/W.PET/CERES
5000 CREAM (GRAM) TOPICAL DAILY
Status: DISCONTINUED | OUTPATIENT
Start: 2024-05-16 | End: 2024-05-17 | Stop reason: HOSPADM

## 2024-05-15 RX ORDER — POTASSIUM CHLORIDE 7.45 MG/ML
10 INJECTION INTRAVENOUS PRN
Status: DISCONTINUED | OUTPATIENT
Start: 2024-05-15 | End: 2024-05-17 | Stop reason: HOSPADM

## 2024-05-15 RX ORDER — ACETAMINOPHEN 325 MG/1
650 TABLET ORAL EVERY 6 HOURS PRN
Status: DISCONTINUED | OUTPATIENT
Start: 2024-05-15 | End: 2024-05-17 | Stop reason: HOSPADM

## 2024-05-15 RX ADMIN — SODIUM CHLORIDE 1000 ML: 9 INJECTION, SOLUTION INTRAVENOUS at 16:41

## 2024-05-15 RX ADMIN — Medication 10 ML: at 21:44

## 2024-05-15 RX ADMIN — FAMOTIDINE 20 MG: 20 TABLET, FILM COATED ORAL at 21:44

## 2024-05-15 RX ADMIN — APIXABAN 5 MG: 5 TABLET, FILM COATED ORAL at 21:44

## 2024-05-15 ASSESSMENT — HEART SCORE: ECG: NORMAL

## 2024-05-15 NOTE — CARE COORDINATION
Case Management Assessment  Initial Evaluation    Date/Time of Evaluation: 5/15/2024 6:29 PM  Assessment Completed by: TASH Gordillo, STEFANIW    If patient is discharged prior to next notation, then this note serves as note for discharge by case management.    Patient Name: Jerri Henry                   YOB: 1950  Diagnosis: Chest pain [R07.9]                   Date / Time: 5/15/2024  2:18 PM    Patient Admission Status: Observation   Readmission Risk (Low < 19, Mod (19-27), High > 27): No data recorded  Current PCP: Vesna Rabago, DO  PCP verified by CM? Yes    Chart Reviewed: Yes      History Provided by: Patient  Patient Orientation: Alert and Oriented    Patient Cognition:      Hospitalization in the last 30 days (Readmission):  No    If yes, Readmission Assessment in CM Navigator will be completed.    Advance Directives:      Code Status: Prior   Patient's Primary Decision Maker is: Legal Next of Kin    Primary Decision Maker: Elmer Henry - Spouse - [phone]    Discharge Planning:    Patient lives with: Spouse/Significant Other Type of Home:    Primary Care Giver: Self  Patient Support Systems include: Spouse/Significant Other   Current Financial resources: Medicare  Current community resources: None  Current services prior to admission: None            Current DME:              Type of Home Care services:  None    ADLS  Prior functional level: Independent in ADLs/IADLs  Current functional level: Independent in ADLs/IADLs    PT AM-PAC:   /24  OT AM-PAC:   /24    Family can provide assistance at DC: Yes  Would you like Case Management to discuss the discharge plan with any other family members/significant others, and if so, who? Yes ( if needed.)  Plans to Return to Present Housing: Yes  Other Identified Issues/Barriers to RETURNING to current housing: None noted at this time.   Potential Assistance needed at discharge: N/A            Potential DME:  None noted.   Patient  expects to discharge to: House  Plan for transportation at discharge: Family    Financial  Payor: MEDICARE / Plan: MEDICARE PART A AND B / Product Type: *No Product type* /     Does insurance require precert for SNF: No    Potential assistance Purchasing Medications: No  Meds-to-Beds request:        [id number] - [place], [place] - [address] - P [phone] - F [phone]  [address]  [facility] [address]  Phone: [phone] Fax: [phone]    [facility] - [place], [place] - [address] - P [phone] - F [phone]  [address] [address]  Phone: [phone] Fax: [phone]      Notes:    Factors facilitating achievement of predicted outcomes: Family support, Motivated, Cooperative, Pleasant, Good insight into deficits, and Knowledge about rehab    Barriers to discharge: None noted at this time.     Additional Case Management Notes:   1) Waiting on cardioloyg clearance.   2) Discharge to home with family.    The Plan for Transition of Care is related to the following treatment goals of Chest pain [R07.9]    The Patient and/or Patient Representative Agree with the Discharge Plan?  Yes.     Respectfully submitted,    Myra BIANCHI, ETHAN-S  [facility]   [phone]    Electronically signed by TASH Gordillo, SANFORD on 5/15/2024 at 6:29 PM

## 2024-05-15 NOTE — PROGRESS NOTES
Clinical Pharmacy Note  Renal Dose Adjustment    Jerri Henry is receiving Famotidine.  This medication is renally eliminated.  Based on the patient's Estimated Creatinine Clearance: 46 mL/min (based on SCr of 0.9 mg/dL). and urine output, the dose has been adjusted to 20mg daily per protocol.    Pharmacy will continue to monitor and adjust dose as needed for changes in renal function.    Chet Elam [facility], 5/15/2024 7:30 PM

## 2024-05-15 NOTE — PROGRESS NOTES
Pt to room 8 after returning from CT. Pt is alert and oriented x4, no complaints of pain at this time. Vital signs stable. Pt's family at bedside.

## 2024-05-15 NOTE — PROGRESS NOTES
4 Eyes Skin Assessment     NAME:  Jerri Henry  YOB: 1950  MEDICAL RECORD NUMBER:  [id number]    The patient is being assessed for  Admission    I agree that at least one RN has performed a thorough Head to Toe Skin Assessment on the patient. ALL assessment sites listed below have been assessed.      Areas assessed by both nurses:    Head, Face, Ears, Shoulders, Back, Chest, Arms, Elbows, Hands, Sacrum. Buttock, Coccyx, Ischium, and Legs. Feet and Heels        Does the Patient have a Wound? No noted wound(s)       Feliciano Prevention initiated by RN: No  Wound Care Orders initiated by RN: No    Pressure Injury (Stage 3,4, Unstageable, DTI, NWPT, and Complex wounds) if present, place Wound referral order by RN under : No    New Ostomies, if present place, Ostomy referral order under : No     Nurse 1 eSignature: Electronically signed by Jaja Rodriguez RN on 5/15/24 at 7:20 PM EDT    **SHARE this note so that the co-signing nurse can place an eSignature**    Nurse 2 eSignature: Electronically signed by Sommer Marcano RN on 5/15/24 at 7:23 PM EDT

## 2024-05-15 NOTE — H&P
Hospital Medicine History & Physical      PCP: Vesna Rabago DO    Date of Admission: 5/15/2024    Date of Service: Pt seen/examined on 5/15/24 and Admitted to Inpatient with expected LOS greater than two midnights due to medical therapy.     Chief Complaint: Chest pain and palpitations      History Of Present Illness:     73 y.o. female with history of paroxysmal A-fib status post ablation x 3, chronic hypotension presented to emergency room with recurrent episodes of chest pain and palpitations...  The patient had an episode of chest pain and palpitations that lasted several hours 3 days ago and did pass out.  She fell backwards and hit her head.  She did not seek medical attention at that time..  This morning she developed recurrent episodes of chest pain with palpitation..  She was advised to come to the ED today by her PCP.  Upon arrival to the ED, symptoms had resolved..  EKG showed normal sinus rhythm without acute ischemic changes, troponins were negative.  Chest x-ray and CT head were unremarkable..  Her SBP was noted to be 80s to low 100s which she states this is chronic..  Home BP recordings were also reviewed..  She however received 1 L fluid bolus    Past Medical History:          Diagnosis Date    Acid reflux     Atrial fibrillation (HCC)        Past Surgical History:          Procedure Laterality Date    ABLATION OF DYSRHYTHMIC FOCUS  04/22/2022    COLONOSCOPY  11/04/2014    ENDOSCOPY, COLON, DIAGNOSTIC  11/04/2014    with biopsy    TOOTH EXTRACTION  1967    Dr. Madi Trevino    TUBAL LIGATION Bilateral 1977    Kemal Mock Dr    WISDOM TOOTH EXTRACTION  1965    Dr. Keith Trevino       Medications Prior to Admission:      Prior to Admission medications    Medication Sig Start Date End Date Taking? Authorizing Provider   Cyanocobalamin (B-12) 5000 MCG SUBL Place 5,000 Units under the tongue daily   Yes Provider, MD Hernan   famotidine (PEPCID) 20 MG tablet Take 1

## 2024-05-15 NOTE — CARE COORDINATION
Advance Care Planning     Advance Care Planning Activator (Inpatient)  Conversation Note      Date of ACP Conversation: 5/15/2024     Conversation Conducted with: Patient with Decision Making Capacity    ACP Activator: TASH Gordillo, W    Health Care Decision Maker:     Current Designated Health Care Decision Maker:     Primary Decision Maker: Elmer Henry - Spouse - [phone]    Care Preferences    Ventilation:  \"If you were in your present state of health and suddenly became very ill and were unable to breathe on your own, what would your preference be about the use of a ventilator (breathing machine) if it were available to you?\"      Would the patient desire the use of ventilator (breathing machine)?: yes    \"If your health worsens and it becomes clear that your chance of recovery is unlikely, what would your preference be about the use of a ventilator (breathing machine) if it were available to you?\"     Would the patient desire the use of ventilator (breathing machine)?: No      Resuscitation  \"CPR works best to restart the heart when there is a sudden event, like a heart attack, in someone who is otherwise healthy. Unfortunately, CPR does not typically restart the heart for people who have serious health conditions or who are very sick.\"    \"In the event your heart stopped as a result of an underlying serious health condition, would you want attempts to be made to restart your heart (answer \"yes\" for attempt to resuscitate) or would you prefer a natural death (answer \"no\" for do not attempt to resuscitate)?\" yes       [] Yes   [] No   Educated Patient / Decision Maker regarding differences between Advance Directives and portable DNR orders.    Length of ACP Conversation in minutes:  2    Conversation Outcomes:  ACP discussion completed    Follow-up plan:    [] Schedule follow-up conversation to continue planning  [] Referred individual to Provider for additional questions/concerns   [] Advised  patient/agent/surrogate to review completed ACP document and update if needed with changes in condition, patient preferences or care setting    [x] This note routed to one or more involved healthcare providers Vesna Rabago DO primary care physician.     Respectfully submitted,    Myra BIANCHI, NILESH  [facility]   [phone]    Electronically signed by TASH Gordillo, SANFORD on 5/15/2024 at 6:30 PM

## 2024-05-15 NOTE — PROGRESS NOTES
PHARMACY NOTE  Jerri Henry was ordered Calcitonin nasal spray. Per the [facility] Formulary Committee, this medication is non-formulary and not stocked by pharmacy. The medication can be reordered at discharge.   Chet Elam [facility], 5/15/2024 7:29 PM

## 2024-05-15 NOTE — PROGRESS NOTES
Pt ambulated to and from bathroom independently without complication. Pt denies any dizziness, shortness of breath, or chest pain.

## 2024-05-15 NOTE — ED PROVIDER NOTES
[facility] EMERGENCY DEPARTMENT  eMERGENCY dEPARTMENT eNCOUnter      Pt Name: Jerri Henry  MRN: [id number]  Birthdate 1950  Date of evaluation: 5/15/2024  Provider: DARLEEN COWAN MD    CHIEF COMPLAINT       Chief Complaint   Patient presents with    Chest Pain    Fall    Dizziness         CRITICAL CARE TIME   Total Critical Care time was 0 minutes, excluding separately reportable procedures.  There was a high probability of clinically significant/life threatening deterioration in the patient's condition which required my urgent intervention.        HISTORY OF PRESENT ILLNESS  (Location/Symptom, Timing/Onset, Context/Setting, Quality, Duration, Modifying Factors, Severity.)   History From: Patient  Limitations to history : None    Jerri Henry is a 73 y.o. female who presents to the emergency department complaining of chest pain, passing out.  Patient states she had an episode on Monday where she felt lightheaded and dizzy.  She had aching discomfort in her chest.  She checked her heart rate and it was elevated, she states it was around 130.  She states she passed out.  She fell backwards.  She hit the back of her head.  She states the chest discomfort she had that day lasted several hours and resolved.  She states since 7 this morning she has had some chest discomfort again.  No dizziness.  She has not passed out today.  She states she went to her primary care provider's office.  She states they sent her over here for evaluation.  She states on the way over here her chest discomfort resolved.  She states as far she knows her heart rate was not increased today.  No current dizziness      Nursing Notes were reviewed and I agree.      SCREENINGS        Latanya Coma Scale  Eye Opening: Spontaneous  Best Verbal Response: Oriented  Best Motor Response: Obeys commands  [place] Coma Scale Score: 15        Heart Score for chest pain patients  History: Moderately Suspicious  ECG: Normal  Patient  with a syncopal episode associated with her first episode.  Her old records show she has had stress test in the past, the most recent one was in July 2021.  I recommended admission for monitoring, following her blood pressure, further workup.  The hospitalist was consulted for admission.  Test results, diagnosis, and treatment plan were discussed with the patient and her .  They understand the treatment plan and need for admission and are agreeable.  I did order 1 L of normal saline in the emergency department.       I am the Primary Clinician of Record.      PROCEDURES:  None    FINAL IMPRESSION      1. Chest pain, unspecified type    2. Syncope and collapse    3. Tachyarrhythmia          DISPOSITION/PLAN   DISPOSITION Decision To Admit 05/15/2024 04:35:59 PM      PATIENT REFERRED TO:  No follow-up provider specified.    DISCHARGE MEDICATIONS:  New Prescriptions    No medications on file       (Please note that portions of this note were completed with a voice recognition program.  Efforts were made to edit the dictations but occasionally words are mis-transcribed.)    DONNY COWAN MD  Attending Emergency Physician        Donny Cowan MD  05/15/24 1514

## 2024-05-15 NOTE — PROGRESS NOTES
Medication Reconciliation    List of medications patient is currently taking is complete.     Source of information: 1. Conversation with patient at bedside                                      2. EPIC records      Notes regarding home medications:   1. Patient reports taking morning medications PTA      Tanmay Tavares [facility]   5/15/2024  4:45 PM

## 2024-05-15 NOTE — ED TRIAGE NOTES
Patient to the ER with complaints of intermittent chest pain that started this morning when she woke up.  Patient saw her PCP today who recommended she go to the ER for further eval.  At time of triage, patient is pain free.  Hx of A-fib.     She also mentions concern because she had a near-syncope episode on Monday that caused her to fall and hit her head.  She is on eliquis at this time.  Denies any headaches following the incident.  She does complain of some intermittent dizziness.     Alert and oriented x4 and ambulatory at time of triage.

## 2024-05-16 ENCOUNTER — APPOINTMENT (OUTPATIENT)
Age: 74
DRG: 316 | End: 2024-05-16
Payer: MEDICARE

## 2024-05-16 LAB
ANION GAP SERPL CALCULATED.3IONS-SCNC: 8 MMOL/L (ref 3–16)
BASOPHILS # BLD: 0 K/UL (ref 0–0.2)
BASOPHILS NFR BLD: 0.5 %
BUN SERPL-MCNC: 17 MG/DL (ref 7–20)
CALCIUM SERPL-MCNC: 9.3 MG/DL (ref 8.3–10.6)
CHLORIDE SERPL-SCNC: 111 MMOL/L (ref 99–110)
CO2 SERPL-SCNC: 26 MMOL/L (ref 21–32)
CREAT SERPL-MCNC: 0.9 MG/DL (ref 0.6–1.2)
DEPRECATED RDW RBC AUTO: 13.4 % (ref 12.4–15.4)
ECHO AO ASC DIAM: 3.2 CM
ECHO AO ASCENDING AORTA INDEX: 2.04 CM/M2
ECHO AO ROOT DIAM: 3.4 CM
ECHO AO ROOT INDEX: 2.17 CM/M2
ECHO AV AREA PEAK VELOCITY: 2.2 CM2
ECHO AV AREA VTI: 2.2 CM2
ECHO AV AREA/BSA PEAK VELOCITY: 1.4 CM2/M2
ECHO AV AREA/BSA VTI: 1.4 CM2/M2
ECHO AV MEAN GRADIENT: 3 MMHG
ECHO AV MEAN VELOCITY: 0.7 M/S
ECHO AV PEAK GRADIENT: 4 MMHG
ECHO AV PEAK VELOCITY: 1 M/S
ECHO AV VELOCITY RATIO: 0.9
ECHO AV VTI: 24.1 CM
ECHO BSA: 1.56 M2
ECHO EST RA PRESSURE: 8 MMHG
ECHO IVC PROX: 1.9 CM
ECHO LA AREA 2C: 8.8 CM2
ECHO LA AREA 4C: 14.6 CM2
ECHO LA MAJOR AXIS: 5.1 CM
ECHO LA MINOR AXIS: 3.4 CM
ECHO LA VOL MOD A2C: 18 ML (ref 22–52)
ECHO LA VOL MOD A4C: 31 ML (ref 22–52)
ECHO LA VOLUME INDEX MOD A2C: 11 ML/M2 (ref 16–34)
ECHO LA VOLUME INDEX MOD A4C: 20 ML/M2 (ref 16–34)
ECHO LV E' LATERAL VELOCITY: 13 CM/S
ECHO LV E' SEPTAL VELOCITY: 9 CM/S
ECHO LV EDV A2C: 70 ML
ECHO LV EDV A4C: 72 ML
ECHO LV EDV INDEX A4C: 46 ML/M2
ECHO LV EDV NDEX A2C: 45 ML/M2
ECHO LV EJECTION FRACTION A2C: 65 %
ECHO LV EJECTION FRACTION A4C: 57 %
ECHO LV EJECTION FRACTION BIPLANE: 61 % (ref 55–100)
ECHO LV ESV A2C: 24 ML
ECHO LV ESV A4C: 31 ML
ECHO LV ESV INDEX A2C: 15 ML/M2
ECHO LV ESV INDEX A4C: 20 ML/M2
ECHO LV FRACTIONAL SHORTENING: 41 % (ref 28–44)
ECHO LV INTERNAL DIMENSION DIASTOLE INDEX: 3.25 CM/M2
ECHO LV INTERNAL DIMENSION DIASTOLIC: 5.1 CM (ref 3.9–5.3)
ECHO LV INTERNAL DIMENSION SYSTOLIC INDEX: 1.91 CM/M2
ECHO LV INTERNAL DIMENSION SYSTOLIC: 3 CM
ECHO LV IVSD: 0.7 CM (ref 0.6–0.9)
ECHO LV MASS 2D: 108.3 G (ref 67–162)
ECHO LV MASS INDEX 2D: 69 G/M2 (ref 43–95)
ECHO LV POSTERIOR WALL DIASTOLIC: 0.6 CM (ref 0.6–0.9)
ECHO LV RELATIVE WALL THICKNESS RATIO: 0.24
ECHO LVOT AREA: 2.5 CM2
ECHO LVOT AV VTI INDEX: 0.85
ECHO LVOT DIAM: 1.8 CM
ECHO LVOT MEAN GRADIENT: 2 MMHG
ECHO LVOT PEAK GRADIENT: 3 MMHG
ECHO LVOT PEAK VELOCITY: 0.9 M/S
ECHO LVOT STROKE VOLUME INDEX: 33.4 ML/M2
ECHO LVOT SV: 52.4 ML
ECHO LVOT VTI: 20.6 CM
ECHO MV A VELOCITY: 0.68 M/S
ECHO MV AREA VTI: 1.8 CM2
ECHO MV E DECELERATION TIME (DT): 223 MS
ECHO MV E VELOCITY: 0.91 M/S
ECHO MV E/A RATIO: 1.34
ECHO MV E/E' LATERAL: 7
ECHO MV E/E' RATIO (AVERAGED): 8.56
ECHO MV E/E' SEPTAL: 10.11
ECHO MV LVOT VTI INDEX: 1.39
ECHO MV MAX VELOCITY: 1 M/S
ECHO MV MEAN GRADIENT: 2 MMHG
ECHO MV MEAN VELOCITY: 0.6 M/S
ECHO MV PEAK GRADIENT: 4 MMHG
ECHO MV REGURGITANT RADIUS PISA: 0.3 CM
ECHO MV VTI: 28.6 CM
ECHO PV MAX VELOCITY: 0.6 M/S
ECHO PV PEAK GRADIENT: 1 MMHG
ECHO RA AREA 4C: 8 CM2
ECHO RA END SYSTOLIC VOLUME APICAL 4 CHAMBER INDEX BSA: 9 ML/M2
ECHO RA VOLUME: 14 ML
ECHO RIGHT VENTRICULAR SYSTOLIC PRESSURE (RVSP): 15 MMHG
ECHO RV BASAL DIMENSION: 3 CM
ECHO RV FREE WALL PEAK S': 11 CM/S
ECHO RV LONGITUDINAL DIMENSION: 5 CM
ECHO RV MID DIMENSION: 2.2 CM
ECHO RV TAPSE: 2.4 CM (ref 1.7–?)
ECHO TV REGURGITANT MAX VELOCITY: 1.3 M/S
ECHO TV REGURGITANT PEAK GRADIENT: 7 MMHG
EKG ATRIAL RATE: 81 BPM
EKG DIAGNOSIS: NORMAL
EKG P-R INTERVAL: 136 MS
EKG Q-T INTERVAL: 362 MS
EKG QRS DURATION: 74 MS
EKG QTC CALCULATION (BAZETT): 420 MS
EKG R AXIS: 32 DEGREES
EKG T AXIS: 44 DEGREES
EKG VENTRICULAR RATE: 81 BPM
EOSINOPHIL # BLD: 0.2 K/UL (ref 0–0.6)
EOSINOPHIL NFR BLD: 4.1 %
GFR SERPLBLD CREATININE-BSD FMLA CKD-EPI: 67 ML/MIN/{1.73_M2}
GLUCOSE SERPL-MCNC: 93 MG/DL (ref 70–99)
HCT VFR BLD AUTO: 35 % (ref 36–48)
HGB BLD-MCNC: 11.9 G/DL (ref 12–16)
LYMPHOCYTES # BLD: 1.3 K/UL (ref 1–5.1)
LYMPHOCYTES NFR BLD: 24 %
MCH RBC QN AUTO: 29.5 PG (ref 26–34)
MCHC RBC AUTO-ENTMCNC: 33.9 G/DL (ref 31–36)
MCV RBC AUTO: 87.1 FL (ref 80–100)
MONOCYTES # BLD: 0.5 K/UL (ref 0–1.3)
MONOCYTES NFR BLD: 9.2 %
NEUTROPHILS # BLD: 3.4 K/UL (ref 1.7–7.7)
NEUTROPHILS NFR BLD: 62.2 %
PLATELET # BLD AUTO: 169 K/UL (ref 135–450)
PMV BLD AUTO: 9.6 FL (ref 5–10.5)
POTASSIUM SERPL-SCNC: 5.1 MMOL/L (ref 3.5–5.1)
RBC # BLD AUTO: 4.02 M/UL (ref 4–5.2)
SODIUM SERPL-SCNC: 145 MMOL/L (ref 136–145)
WBC # BLD AUTO: 5.4 K/UL (ref 4–11)

## 2024-05-16 PROCEDURE — 93010 ELECTROCARDIOGRAM REPORT: CPT | Performed by: INTERNAL MEDICINE

## 2024-05-16 PROCEDURE — 80048 BASIC METABOLIC PNL TOTAL CA: CPT

## 2024-05-16 PROCEDURE — 93005 ELECTROCARDIOGRAM TRACING: CPT

## 2024-05-16 PROCEDURE — G0378 HOSPITAL OBSERVATION PER HR: HCPCS

## 2024-05-16 PROCEDURE — 2580000003 HC RX 258: Performed by: HOSPITALIST

## 2024-05-16 PROCEDURE — 85025 COMPLETE CBC W/AUTO DIFF WBC: CPT

## 2024-05-16 PROCEDURE — 99222 1ST HOSP IP/OBS MODERATE 55: CPT

## 2024-05-16 PROCEDURE — 6370000000 HC RX 637 (ALT 250 FOR IP): Performed by: HOSPITALIST

## 2024-05-16 PROCEDURE — 93306 TTE W/DOPPLER COMPLETE: CPT | Performed by: INTERNAL MEDICINE

## 2024-05-16 PROCEDURE — 93306 TTE W/DOPPLER COMPLETE: CPT

## 2024-05-16 PROCEDURE — 36415 COLL VENOUS BLD VENIPUNCTURE: CPT

## 2024-05-16 RX ADMIN — Medication 10 ML: at 21:00

## 2024-05-16 RX ADMIN — APIXABAN 5 MG: 5 TABLET, FILM COATED ORAL at 09:07

## 2024-05-16 RX ADMIN — CYANOCOBALAMIN TAB 1000 MCG 5000 MCG: 1000 TAB at 09:06

## 2024-05-16 RX ADMIN — Medication 10 ML: at 09:07

## 2024-05-16 RX ADMIN — FAMOTIDINE 20 MG: 20 TABLET, FILM COATED ORAL at 09:07

## 2024-05-16 RX ADMIN — APIXABAN 5 MG: 5 TABLET, FILM COATED ORAL at 20:58

## 2024-05-16 NOTE — PROGRESS NOTES
Perfect serve sent to MD about patient wanting to eat and be NPO AT MIDNIGHT. Patient is currently NPO.

## 2024-05-16 NOTE — PLAN OF CARE
Problem: Discharge Planning  Goal: Discharge to home or other facility with appropriate resources  Outcome: Progressing  Flowsheets  Taken 5/15/2024 2100 by Elif Castillo RN  Discharge to home or other facility with appropriate resources:   Identify barriers to discharge with patient and caregiver   Arrange for needed discharge resources and transportation as appropriate   Identify discharge learning needs (meds, wound care, etc)  Taken 5/15/2024 1959 by Jaja Rodriguez RN  Discharge to home or other facility with appropriate resources:   Identify barriers to discharge with patient and caregiver   Arrange for needed discharge resources and transportation as appropriate   Identify discharge learning needs (meds, wound care, etc)     Problem: Safety - Adult  Goal: Free from fall injury  Outcome: Progressing     Problem: ABCDS Injury Assessment  Goal: Absence of physical injury  Outcome: Progressing     Problem: Cardiovascular - Adult  Goal: Maintains optimal cardiac output and hemodynamic stability  Outcome: Progressing  Goal: Absence of cardiac dysrhythmias or at baseline  Outcome: Progressing

## 2024-05-16 NOTE — PROGRESS NOTES
V2.0    [facility] Progress Note      Name:  Jerri Henry /Age/Sex: 1950  (73 y.o. female)   MRN & CSN:  [id number] & [id number] Encounter Date/Time: 2024 8:57 AM EDT   Location:  H8L-2412/4119-01 PCP: Vesna Rabago DO     Attending:Ruben Zavala MD       Hospital Day: 2      HPI :   Chief Complaint: Chest pain and palpitations    Jerri Henry is a 73 y.o. female who presents with intermittent chest pains and palpitations      Subjective:   Patient is feeling well today with no complaints.  No chest pain or shortness of breath      Review of Systems:      Pertinent positives and negatives discussed in HPI    Objective:     Intake/Output Summary (Last 24 hours) at 2024 1123  Last data filed at 2024 0547  Gross per 24 hour   Intake 861.71 ml   Output --   Net 861.71 ml      Vitals:   Vitals:    24 0349 24 0445 24 0752 24 1116   BP: 117/70  115/70 115/70   Pulse: 76  65    Resp: 15  15    Temp: 97.5 °F (36.4 °C)  98.5 °F (36.9 °C)    TempSrc:   Oral    SpO2: 97%  96%    Weight:  54.3 kg (119 lb 11.2 oz)  54 kg (119 lb)   Height:    1.626 m (5' 4\")         Physical Exam:      Physical Exam Performed:    /70   Pulse 65   Temp 98.5 °F (36.9 °C) (Oral)   Resp 15   Ht 1.626 m (5' 4\")   Wt 54 kg (119 lb)   SpO2 96%   BMI 20.43 kg/m²     General appearance: No apparent distress, appears stated age and cooperative.  HEENT: Pupils equal, round, and reactive to light. Conjunctivae/corneas clear.  Neck: Supple, with full range of motion. No jugular venous distention. Trachea midline.  Respiratory:  Normal respiratory effort. Clear to auscultation, bilaterally without Rales/Wheezes/Rhonchi.  Cardiovascular: Regular rate and rhythm with normal S1/S2 without murmurs, rubs or gallops.  Abdomen: Soft, non-tender, non-distended with normal bowel sounds.  Musculoskeletal: No clubbing, cyanosis or edema bilaterally.  Full range of motion without

## 2024-05-16 NOTE — PROGRESS NOTES
Pt admitted to room 4119  Pt is A&O x 4  NSR on tele  Pt oriented to room, call light, fall precautions  Call light placed with reach  Needs met  Orders reviewed with patient    Report given to oncoming nurse

## 2024-05-16 NOTE — CONSULTS
[facility]     Electrophysiology                                     Consult Note    Admission date:  5/15/2024    Reason for consult: Paroxysmal AF    HPI/CC: Jerri Henry was admitted on 5/15/24 with recurrent episodes of chest pain and palpitations. She has previously been treated for paroxysmal AF with ablation including pulmonary vein isolation and creation of a roof line on 20 with Dr Petty. She was placed on a 30d event monitor after which showed NSR with some PVC. She had near syncope that led to placement of implantable loop recorder on 21. She then had repeat AF (PVI, CAFE) and left AFL (roof line) ablation 21 with Dr Petty. Flecainide and Toprol were stopped , she then had recurrence of AF and AFL noted on ILR in  and discussed repeat ablation, but did not proceed. She was noted to have breakthrough AF on her last office appointment 23.     She reports previous feelings of palpitations that resolve with rest. She had one syncopal episode over the weekend when she fell backwards and hit her head, but did not seek medical treatment at that time. She was advised by her PCP to admit to ED on 5/15.     EKG - NSR w/o ischemic changes  Negative Troponins   CXR, CT head unremarkable   Chronic Hypotension (per patient) - SBP in 80s - low 100s    Did receive 1L fluid bolus       She is currently NSR.     Subjective: She complains of some dizziness and palpitations with activity that resolves with rest. She will relax in her chair and drink water which brings resolution of symptoms. She will occasionally fall asleep during these episodes and when she wakes, symptoms have resolved. She feels much better now.      Vitals:  Blood pressure 115/70, pulse 65, temperature 98.5 °F (36.9 °C), temperature source Oral, resp. rate 15, height 1.626 m (5' 4\"), weight 54 kg (119 lb), SpO2 96 %, not currently breastfeeding.  Temp  Av.8 °F (36.6 °C)  Min: 97.4 °F (36.3 °C)  Max: 98.5 °F  05/16/2024 05:34 AM    CO2 26 05/16/2024 05:34 AM     CBC:    Lab Results   Component Value Date/Time    WBC 5.4 05/16/2024 05:34 AM    RBC 4.02 05/16/2024 05:34 AM    HGB 11.9 05/16/2024 05:34 AM    HCT 35.0 05/16/2024 05:34 AM    MCV 87.1 05/16/2024 05:34 AM    RDW 13.4 05/16/2024 05:34 AM     05/16/2024 05:34 AM     BNP:  No results found for: \"BNP\"  Fasting Lipid Panel:    Lab Results   Component Value Date/Time    CHOL 157 02/21/2024 06:30 AM    HDL 64 02/21/2024 06:30 AM    TRIG 79 02/21/2024 06:30 AM     Cardiac Enzymes:  CK/MbTroponin  Lab Results   Component Value Date/Time    TROPONINI <0.01 07/13/2021 03:27 AM     PT/ INR   Lab Results   Component Value Date/Time    INR 1.15 04/26/2021 10:31 AM    PROTIME 13.4 04/26/2021 10:31 AM     PTT No components found for: \"PTT\"   Lab Results   Component Value Date/Time    MG 1.90 07/12/2021 04:11 PM      Lab Results   Component Value Date/Time    TSH 2.44 07/12/2021 10:10 PM       Assessment:  1. Paroxysmal Atrial Fibrillation   - s/p ablation x3   - CHADS2-VASc 2 (age, gender), on Eliquis 5mg BID   - breakthrough AF/AFL noted on ILR    - Was prescribed Toprol 12.5mg PRN for rate control (not taking)   - ECHO 55-60%    2. Chest pain   - Resolved    - EKG with no ischemic changes   - Troponins negative   - Stress test negative 7/13/21    2. Chronic Hypotension   - Consider adding home Midodrine if + orthostatic HoTN     - Maintain hydration 10-12 (8oz) glasses daily, increase to 12-15 on hot days      Plan:   1. Continue Eliquis 5mg   2. Interrogate IRL for any AF/AFL burden   3. Monitor orthostatic HoTN    If +, consider adding home midodrine   4. Monitor BP at home to evaluate if syncopal episodes r/t soft BP      Assessment and plan discussed with Dr Jameson, who is in agreement     BRUNILDA Malone-MORGAN  [facility]  [phone]

## 2024-05-16 NOTE — PLAN OF CARE
Problem: Discharge Planning  Goal: Discharge to home or other facility with appropriate resources  5/16/2024 0922 by Nusrat Davis RN  Outcome: Progressing  5/15/2024 2208 by Elif Castillo RN  Outcome: Progressing  Flowsheets  Taken 5/15/2024 2100 by Elif Castillo RN  Discharge to home or other facility with appropriate resources:   Identify barriers to discharge with patient and caregiver   Arrange for needed discharge resources and transportation as appropriate   Identify discharge learning needs (meds, wound care, etc)  Taken 5/15/2024 1959 by Jaja Rodriguez RN  Discharge to home or other facility with appropriate resources:   Identify barriers to discharge with patient and caregiver   Arrange for needed discharge resources and transportation as appropriate   Identify discharge learning needs (meds, wound care, etc)     Problem: Safety - Adult  Goal: Free from fall injury  5/16/2024 0922 by Nusrat Davis RN  Outcome: Progressing  5/15/2024 2208 by Elif Castillo RN  Outcome: Progressing     Problem: ABCDS Injury Assessment  Goal: Absence of physical injury  5/16/2024 0922 by Nusrat Davis RN  Outcome: Progressing  5/15/2024 2208 by Elif Castillo RN  Outcome: Progressing     Problem: Cardiovascular - Adult  Goal: Maintains optimal cardiac output and hemodynamic stability  5/16/2024 0922 by Nusrat Davis RN  Outcome: Progressing  5/15/2024 2208 by Elif Castillo RN  Outcome: Progressing  Goal: Absence of cardiac dysrhythmias or at baseline  5/16/2024 0922 by Nusrat Davis RN  Outcome: Progressing  5/15/2024 2208 by Elif Castillo RN  Outcome: Progressing

## 2024-05-17 VITALS
OXYGEN SATURATION: 96 % | TEMPERATURE: 97.7 F | WEIGHT: 120 LBS | HEIGHT: 64 IN | SYSTOLIC BLOOD PRESSURE: 133 MMHG | RESPIRATION RATE: 15 BRPM | BODY MASS INDEX: 20.49 KG/M2 | HEART RATE: 74 BPM | DIASTOLIC BLOOD PRESSURE: 65 MMHG

## 2024-05-17 PROBLEM — R55 SYNCOPE AND COLLAPSE: Status: ACTIVE | Noted: 2024-05-17

## 2024-05-17 LAB
EKG ATRIAL RATE: 63 BPM
EKG DIAGNOSIS: NORMAL
EKG P AXIS: 71 DEGREES
EKG P-R INTERVAL: 166 MS
EKG Q-T INTERVAL: 388 MS
EKG QRS DURATION: 74 MS
EKG QTC CALCULATION (BAZETT): 397 MS
EKG R AXIS: 26 DEGREES
EKG T AXIS: 66 DEGREES
EKG VENTRICULAR RATE: 63 BPM

## 2024-05-17 PROCEDURE — G0378 HOSPITAL OBSERVATION PER HR: HCPCS

## 2024-05-17 PROCEDURE — 99233 SBSQ HOSP IP/OBS HIGH 50: CPT

## 2024-05-17 PROCEDURE — 6370000000 HC RX 637 (ALT 250 FOR IP): Performed by: HOSPITALIST

## 2024-05-17 PROCEDURE — 97530 THERAPEUTIC ACTIVITIES: CPT

## 2024-05-17 PROCEDURE — 93010 ELECTROCARDIOGRAM REPORT: CPT | Performed by: INTERNAL MEDICINE

## 2024-05-17 PROCEDURE — 97165 OT EVAL LOW COMPLEX 30 MIN: CPT

## 2024-05-17 PROCEDURE — 6370000000 HC RX 637 (ALT 250 FOR IP)

## 2024-05-17 PROCEDURE — 97161 PT EVAL LOW COMPLEX 20 MIN: CPT

## 2024-05-17 PROCEDURE — 2580000003 HC RX 258: Performed by: HOSPITALIST

## 2024-05-17 PROCEDURE — 97116 GAIT TRAINING THERAPY: CPT

## 2024-05-17 PROCEDURE — 1200000000 HC SEMI PRIVATE

## 2024-05-17 PROCEDURE — 94760 N-INVAS EAR/PLS OXIMETRY 1: CPT

## 2024-05-17 RX ORDER — METOPROLOL SUCCINATE 25 MG/1
12.5 TABLET, EXTENDED RELEASE ORAL DAILY
Qty: 30 TABLET | Refills: 3 | Status: SHIPPED | OUTPATIENT
Start: 2024-05-18

## 2024-05-17 RX ORDER — METOPROLOL SUCCINATE 25 MG/1
12.5 TABLET, EXTENDED RELEASE ORAL DAILY
Status: DISCONTINUED | OUTPATIENT
Start: 2024-05-17 | End: 2024-05-17 | Stop reason: HOSPADM

## 2024-05-17 RX ORDER — MIDODRINE HYDROCHLORIDE 5 MG/1
5 TABLET ORAL
Status: DISCONTINUED | OUTPATIENT
Start: 2024-05-17 | End: 2024-05-17

## 2024-05-17 RX ADMIN — FAMOTIDINE 20 MG: 20 TABLET, FILM COATED ORAL at 07:50

## 2024-05-17 RX ADMIN — CYANOCOBALAMIN TAB 1000 MCG 5000 MCG: 1000 TAB at 07:50

## 2024-05-17 RX ADMIN — APIXABAN 5 MG: 5 TABLET, FILM COATED ORAL at 07:50

## 2024-05-17 RX ADMIN — Medication 10 ML: at 07:50

## 2024-05-17 RX ADMIN — METOPROLOL SUCCINATE 12.5 MG: 25 TABLET, EXTENDED RELEASE ORAL at 15:37

## 2024-05-17 NOTE — PLAN OF CARE
Problem: Discharge Planning  Goal: Discharge to home or other facility with appropriate resources  5/17/2024 0133 by Halle Nicholas RN  Outcome: Progressing  5/17/2024 0133 by Halle Nicholas RN  Outcome: Progressing  Flowsheets (Taken 5/16/2024 2046)  Discharge to home or other facility with appropriate resources: Identify barriers to discharge with patient and caregiver     Problem: Safety - Adult  Goal: Free from fall injury  5/17/2024 0133 by Halle Nicholas RN  Outcome: Progressing  5/17/2024 0133 by Halle Nicholas RN  Outcome: Progressing     Problem: ABCDS Injury Assessment  Goal: Absence of physical injury  5/17/2024 0133 by Halle Nicholas RN  Outcome: Progressing  5/17/2024 0133 by Halle Nicholas RN  Outcome: Progressing     Problem: Cardiovascular - Adult  Goal: Maintains optimal cardiac output and hemodynamic stability  5/17/2024 0133 by Halle Nicholas RN  Outcome: Progressing  5/17/2024 0133 by Halle Nicholas RN  Outcome: Progressing  Flowsheets (Taken 5/16/2024 2046)  Maintains optimal cardiac output and hemodynamic stability: Monitor blood pressure and heart rate  Goal: Absence of cardiac dysrhythmias or at baseline  5/17/2024 0133 by Halle Nicholas RN  Outcome: Progressing  5/17/2024 0133 by Halle Nicholas RN  Outcome: Progressing  Flowsheets (Taken 5/16/2024 2046)  Absence of cardiac dysrhythmias or at baseline: Monitor cardiac rate and rhythm

## 2024-05-17 NOTE — PROGRESS NOTES
Discharge instructions reviewed with patient, verbalized understanding. IV removed without complication, dry dressing applied. Patient ambulatory for discharge.

## 2024-05-17 NOTE — PROGRESS NOTES
Pt brought in her home BP monitor to compare to ours. Her machine read 117/62, ours read 124/74. Will continue to monitor.

## 2024-05-17 NOTE — PROGRESS NOTES
Physical Therapy  Facility/Department: [address] MED SURG  Physical Therapy Initial and Discharge Assessment    Name: Jerri Henry  : 1950  MRN: [id number]  Date of Service: 2024    Discharge Recommendations:  Home with assist PRN, No therapy recommended at discharge   Jerri Henry scored a 24/24 on the AM-PAC short mobility form.  At this time, no further PT is recommended upon discharge due to no needs.  Recommend patient returns to prior setting with prior services.    PT Equipment Recommendations  Equipment Needed: No      Patient Diagnosis(es): The primary encounter diagnosis was Chest pain, unspecified type. Diagnoses of Syncope and collapse, Tachyarrhythmia, and Paroxysmal atrial fibrillation (HCC) were also pertinent to this visit.  Past Medical History:  has a past medical history of Acid reflux and Atrial fibrillation (HCC).  Past Surgical History:  has a past surgical history that includes Colonoscopy (2014); Endoscopy, colon, diagnostic (2014); Tubal ligation (Bilateral, ); [place] tooth extraction (); Tooth Extraction (); and ablation of dysrhythmic focus (2022).    Assessment   Assessment: The pt is a 72 yo female who presented to the ED with chest pain and palpitations associated with one episode of syncope/fall. In the ED her BP's were low but the pt reports this is chronic. The pt was given fluids. PMHx: a-fib, ablation procedure x 3, chronic hypotension   The pt reports she lives with her  in a house where she ges to the basement for laundry and a second floor for bed and bath. PTA, the pt reports she was indep in mobility, self-care, and IADL's; she uses no device for ambulation and did have one fall a couple of days ago when she passed out and a fall down the stairs a few months ago. Today, the pt demonstrated that she is functioning at her baseline and was indep in bed mobility, transfers and ambulation w/o a device and able to walk the distance  rails  Entrance Stairs - Number of Steps: 1  Bathroom Shower/Tub: Tub/Shower unit  Bathroom Toilet: Standard (sink nearby)  Bathroom Equipment: Grab bars in shower  Bathroom Accessibility: Walker accessible  Home Equipment:  (no DME)  ADL Assistance: Independent  Homemaking Assistance: Independent  Ambulation Assistance: Independent  Transfer Assistance: Independent  Active : Yes  Occupation: Retired  Type of Occupation: school cafeteria  Vision/Hearing  Vision  Vision: Impaired  Vision Exceptions: Wears glasses at all times;Cataracts (starting with cataracts)  Hearing  Hearing: Within functional limits    Cognition   Orientation  Overall Orientation Status: Within Functional Limits  Orientation Level: Oriented to person;Oriented to situation;Oriented to time;Oriented to place  Cognition  Overall Cognitive Status: WFL     Objective        Observation/Palpation  Observation: orthostatic BP's: supine: 126/85, HR 72; sittin/77, HR 65; standin/81, HR 76    Gross Assessment  AROM: Within functional limits  Strength: Within functional limits  Coordination: Within functional limits  Tone: Normal  Sensation: Intact     Bed mobility  Supine to Sit: Independent  Sit to Supine: Independent  Scooting: Independent    Transfers  Sit to Stand: Independent  Stand to Sit: Independent    Ambulation  Surface: Level tile  Device: No Device  Assistance: Independent  Quality of Gait: steady and moderate pace, no LOB  Distance: 1 lap around the nursing unit including the loungs, short room distances  More Ambulation?: No  Stairs/Curb  Stairs?: Yes  Stairs  # Steps : 12  Rails: Right ascending  Device: No Device  Assistance: Independent  Comment: step-over-step, no LOB       Balance  Posture: Good  Sitting - Static: Good  Sitting - Dynamic: Good  Standing - Static: Good  Standing - Dynamic: Good           AM-PAC - Mobility    AM-PAC Basic Mobility - Inpatient   How much help is needed turning from your back to your side

## 2024-05-17 NOTE — DISCHARGE SUMMARY
V2.0  Discharge Summary    Name:  Jerri Henry /Age/Sex: 1950 (73 y.o. female)   Admit Date: 5/15/2024  Discharge Date: 24    MRN & CSN:  [id number] & [id number] Encounter Date and Time 24 6:06 PM EDT    Attending:  Jojo Ortiz MD Discharging Provider: Jojo Ortiz MD       Hospital Course:     Brief HPI: Jerri Henry is a 73 y.o. female with history of atrial fibrillation status post ablation x 3 by Dr. Ballard who presented with syncope, intermittent chest pain and palpitations.      Brief Problem Based Course:   Syncopal episode, chest pain/palpitations.  Initial workup included EKG without acute ischemic changes.  Troponin negative.  2D echo completed which showed normal LV function normal wall motion and normal diastolic dysfunction.  SBP noted to be on low side.  Chest pain intermittent palpitations did not recur in the hospital.  Patient is on Eliquis which was resumed.  Metoprolol XL 12.5 mg as needed as a home medication which is resumed as daily.  Patient seen by EP who is recommended ILR interrogation.  And follow-up as outpatient.  Patient noted to have chronic hypotension possible etiology for syncope.  Patient recommended to monitor blood pressure at home and follow-up with cardiology with those results, plan to possibly start on midodrine as outpatient per cardiology.    The patient expressed appropriate understanding of, and agreement with the discharge recommendations, medications, and plan.     Consults this admission:  IP CONSULT TO CARDIOLOGY    Discharge Diagnosis:   Chest pain        Discharge Instruction:   Follow up appointments: Cardiology  Primary care physician: Vesna Rabago DO within 2 weeks  Diet: regular diet   Activity: activity as tolerated  Disposition: Discharged to:   [x]Home, []C, []SNF, []Acute Rehab, []Hospice   Condition on discharge: Stable  Labs and Tests to be Followed up as an outpatient by PCP or Specialist:     Discharge Medications:

## 2024-05-17 NOTE — PROGRESS NOTES
V2.0    [facility]      Name:  Jerri Henry /Age/Sex: 1950  (73 y.o. female)   MRN & CSN:  [id number] & [id number] Encounter Date/Time: 2024 8:57 AM EDT   Location:  [id number] PCP: Vesna Rabago DO     Attending:Jojo Ortiz MD       Hospital Day: 3      HPI :   Chief Complaint: Chest pain and palpitations    Jerri Henry is a 73 y.o. female who presents with intermittent chest pains and palpitations      Subjective:   Patient reports overall feeling much better today, denies chest pain or palpitation, PT OT consulted, plan for ARU evaluation    Review of Systems:      Pertinent positives and negatives discussed in HPI    Objective:     Intake/Output Summary (Last 24 hours) at 2024 1415  Last data filed at 2024 0915  Gross per 24 hour   Intake 228 ml   Output --   Net 228 ml      Vitals:   Vitals:    24 0438 24 0741 24 0754 24 1129   BP:  117/71  106/71   Pulse:  65  70   Resp:   16 15   Temp:  98.1 °F (36.7 °C)  98 °F (36.7 °C)   TempSrc:  Oral  Oral   SpO2:  96% 97% 97%   Weight: 54.4 kg (120 lb)      Height:             Physical Exam:      Physical Exam Performed:    /71   Pulse 70   Temp 98 °F (36.7 °C) (Oral)   Resp 15   Ht 1.626 m (5' 4\")   Wt 54.4 kg (120 lb)   SpO2 97%   BMI 20.60 kg/m²     General: NAD  Eyes: EOMI  ENT: neck supple  Cardiovascular: Regular rate.  Respiratory: Clear to auscultation  Gastrointestinal: Soft, non tender  Genitourinary: no suprapubic tenderness  Musculoskeletal: No edema  Skin: warm, dry  Neuro: Alert.  Psych: Mood appropriate.         Medications:   Medications:    metoprolol succinate  12.5 mg Oral Daily    apixaban  5 mg Oral BID    vitamin B-12  5,000 mcg Oral Daily    sodium chloride flush  5-40 mL IntraVENous 2 times per day    famotidine  20 mg Oral Daily      Infusions:    sodium chloride       PRN Meds: perflutren lipid microspheres, 1.5 mL, ONCE PRN  sodium chloride flush, 5-40

## 2024-05-17 NOTE — PROGRESS NOTES
Occupational Therapy  Facility/Department: [address] MED SURG  Occupational Therapy Initial Assessment and Discharge    Name: Jerri Henry  : 1950  MRN: [id number]  Date of Service: 2024    Discharge Recommendations:  Home with assist PRN  OT Equipment Recommendations  Equipment Needed: No    Jerri Henry scored a 24/24 on the AM-PAC ADL Inpatient form.  At this time, no further OT is recommended upon discharge due to pt functioning at baseline level of independent.  Recommend patient returns to prior setting with prior services.          Patient Diagnosis(es): The primary encounter diagnosis was Chest pain, unspecified type. Diagnoses of Syncope and collapse, Tachyarrhythmia, and Paroxysmal atrial fibrillation (HCC) were also pertinent to this visit.  Past Medical History:  has a past medical history of Acid reflux and Atrial fibrillation (HCC).  Past Surgical History:  has a past surgical history that includes Colonoscopy (2014); Endoscopy, colon, diagnostic (2014); Tubal ligation (Bilateral, ); [place] tooth extraction (); Tooth Extraction (); and ablation of dysrhythmic focus (2022).           Assessment   Assessment: Pt presents to be functioning at baseline, and does not require any additional acute OT. Pt UAL in room completing ADLs independently, and completed fxl mobility community distance in hallway with no AD independently. Orthostatics taken during session and were negative. Pt with no complaints throughout session. Anticipate pt will be safe to return home with  as prior. No acute OT goals identified, will discharge.  Prognosis: Good  Decision Making: Low Complexity  History: see above  REQUIRES OT FOLLOW-UP: No  Activity Tolerance  Activity Tolerance: Patient Tolerated treatment well        Plan   Occupational Therapy Plan  Times Per Week: d/c acute OT     Restrictions  Restrictions/Precautions  Restrictions/Precautions: Up Ad Bessy    Subjective  Minutes: 29 Minutes       Gabrielle Lee, OTR/L [id number]

## 2024-05-17 NOTE — PLAN OF CARE
Problem: Discharge Planning  Goal: Discharge to home or other facility with appropriate resources  5/17/2024 1300 by Geno Dumont RN  Outcome: Progressing  5/17/2024 0133 by Halle Nicholas RN  Outcome: Progressing  5/17/2024 0133 by Halle Nicholas RN  Outcome: Progressing  Flowsheets (Taken 5/16/2024 2046)  Discharge to home or other facility with appropriate resources: Identify barriers to discharge with patient and caregiver     Problem: Safety - Adult  Goal: Free from fall injury  5/17/2024 1300 by Geno Dumont RN  Outcome: Progressing  5/17/2024 0133 by Halle Nicholas RN  Outcome: Progressing  5/17/2024 0133 by Halle Nicholas RN  Outcome: Progressing     Problem: ABCDS Injury Assessment  Goal: Absence of physical injury  5/17/2024 1300 by Geno Dumont RN  Outcome: Progressing  5/17/2024 0133 by Halle Nicholas RN  Outcome: Progressing  5/17/2024 0133 by Halle Nicholas RN  Outcome: Progressing     Problem: Cardiovascular - Adult  Goal: Maintains optimal cardiac output and hemodynamic stability  5/17/2024 1300 by Geno Dumont RN  Outcome: Progressing  5/17/2024 0133 by Halle Nicholas RN  Outcome: Progressing  5/17/2024 0133 by Halle Nicholas RN  Outcome: Progressing  Flowsheets (Taken 5/16/2024 2046)  Maintains optimal cardiac output and hemodynamic stability: Monitor blood pressure and heart rate  Goal: Absence of cardiac dysrhythmias or at baseline  5/17/2024 1300 by Geno Dumont RN  Outcome: Progressing  5/17/2024 0133 by Halle Nicholas RN  Outcome: Progressing  5/17/2024 0133 by Halle Nicholas RN  Outcome: Progressing  Flowsheets (Taken 5/16/2024 2046)  Absence of cardiac dysrhythmias or at baseline: Monitor cardiac rate and rhythm

## 2024-05-17 NOTE — PLAN OF CARE
Problem: Discharge Planning  Goal: Discharge to home or other facility with appropriate resources  5/17/2024 1838 by Geno Dumont RN  Outcome: Completed  5/17/2024 1300 by Geno Dumont RN  Outcome: Progressing     Problem: Safety - Adult  Goal: Free from fall injury  5/17/2024 1838 by Geno Dumont RN  Outcome: Completed  5/17/2024 1300 by Geno Dumont RN  Outcome: Progressing     Problem: ABCDS Injury Assessment  Goal: Absence of physical injury  5/17/2024 1838 by Geno Dumont RN  Outcome: Completed  5/17/2024 1300 by Geno Dumont RN  Outcome: Progressing     Problem: Cardiovascular - Adult  Goal: Maintains optimal cardiac output and hemodynamic stability  5/17/2024 1838 by Geno Dumont RN  Outcome: Completed  5/17/2024 1300 by Geno Dumont RN  Outcome: Progressing  Goal: Absence of cardiac dysrhythmias or at baseline  5/17/2024 1838 by Geno Dumont RN  Outcome: Completed  5/17/2024 1300 by Geno Dumont RN  Outcome: Progressing

## 2024-05-17 NOTE — PROGRESS NOTES
[facility]     Electrophysiology                                     Consult Note    Admission date:  5/15/2024    Reason for consult: Paroxysmal AF    HPI/CC: Jerri Henry was admitted on 5/15/24 with recurrent episodes of chest pain and palpitations. She has previously been treated for paroxysmal AF with ablation including pulmonary vein isolation and creation of a roof line on 8/11/20 with Dr Petty. She was placed on a 30d event monitor after which showed NSR with some PVC. She had near syncope that led to placement of implantable loop recorder on 7/13/21. She then had repeat AF (PVI, CAFE) and left AFL (roof line) ablation 9/21/21 with Dr Petty. Flecainide and Toprol were stopped 12/21, she then had recurrence of AF and AFL noted on ILR in 2022 and discussed repeat ablation, but did not proceed. She was noted to have breakthrough AF on her last office appointment 11/6/23.     She reports previous feelings of palpitations that resolve with rest. She had one syncopal episode over the weekend when she fell backwards and hit her head, but did not seek medical treatment at that time. She was advised by her PCP to admit to ED on 5/15. EKG was negative w/o ischemic changes, negative troponins, CXR, CT head unremarkable, 1L fluid bolus for hypotension.     She is currently NSR. Dizziness and palpitations resolved. Tele monitor showing normal HR variation with ambulation, no sustained tachycardia, PVCs, AF or AFL. Syncope likely d/t chronic HoTN. Home BP readings as low as 71/66. Did plan to start home Midodrine, but BP during hospital admission have been normal - high. Will have patient assess home BP monitoring device at follow up appointment to ensure accuracy and if accurate, then proceed with home Midodrine      Subjective: Denies chest pain, palpitations, shortness of breath, and dizziness.       Vitals:  Blood pressure 117/71, pulse 65, temperature 98.1 °F (36.7 °C), temperature source Oral, resp. rate  acetaminophen (TYLENOL) tablet 650 mg  650 mg Oral Q6H PRN Ruben Zavala MD        Or    acetaminophen (TYLENOL) suppository 650 mg  650 mg Rectal Q6H PRN Ruben Zavala MD        famotidine (PEPCID) tablet 20 mg  20 mg Oral Daily Ruben Zavala MD   20 mg at 05/17/24 0750       Objective:     Telemetry monitor: NSR    Physical Exam:  Constitutional and general appearance: alert, cooperative, no distress, and appears stated age  HEENT: PERRL, no cervical lymphadenopathy. No masses palpable. Normal oral mucosa  Respiratory:  Normal excursion and expansion without use of accessory muscles  Resp auscultation: Normal breath sounds without wheezing, rhonchi, and rales  Cardiovascular:  The apical impulse is not displaced  Heart tones are crisp and normal. regular S1 and S2.  Jugular venous pulsation Normal  The carotid upstroke is normal in amplitude and contour without delay or bruit  Peripheral pulses are symmetrical and full   Abdomen:  No masses or tenderness  Bowel sounds present  Extremities:   No cyanosis or clubbing   No lower extremity edema   Skin: warm and dry  Neurological:  Alert and oriented  Moves all extremities well  No abnormalities of mood, affect, memory, mentation, or behavior are noted    Data  EKG 5/16/2024:   NSR, ventricular rate 63    Echo 5/16/24:  Pending Final Results:    Left Ventricle: Normal left ventricular systolic function with a visually estimated EF of 55 - 60%. Left ventricle size is normal. Normal wall thickness. Normal wall motion. Normal diastolic function.    Aortic Valve: Trileaflet valve. Mildly thickened cusp.    Mitral Valve: Mildly thickened leaflet. Mild regurgitation.     Stress Test 7/13/21:   Summary   Normal myocardial perfusion study.   Normal LV size and systolic function.   ECG portion of the stress test is normal.   Overall findings represent a low risk study.    All labs and testing reviewed.  Lab Review     Renal Profile:   Lab Results   Component

## 2024-05-20 ENCOUNTER — TELEPHONE (OUTPATIENT)
Dept: CARDIOLOGY CLINIC | Age: 74
End: 2024-05-20

## 2024-05-20 NOTE — TELEPHONE ENCOUNTER
Jerri called to reschedule her hospital f/u for 6/21/24 to the next week 6/28/24.  She will be out of town nothing available on Dr. Vieira schedule.  Please advise.    Call back number [phone]

## 2024-05-20 NOTE — TELEPHONE ENCOUNTER
Please schedule next available opening. Do NOT double book. Can be W[place] or Verdick. Appointment should be 30 min.

## 2024-06-18 ENCOUNTER — TELEPHONE (OUTPATIENT)
Dept: CARDIOLOGY CLINIC | Age: 74
End: 2024-06-18

## 2024-06-18 NOTE — TELEPHONE ENCOUNTER
Jerri is currently on vacation. She did not bring along her monitor so it will not be any readings for 5 days. She called in just to inform us.

## 2024-07-02 ENCOUNTER — OFFICE VISIT (OUTPATIENT)
Dept: CARDIOLOGY CLINIC | Age: 74
End: 2024-07-02

## 2024-07-02 VITALS
SYSTOLIC BLOOD PRESSURE: 94 MMHG | HEART RATE: 72 BPM | HEIGHT: 64 IN | DIASTOLIC BLOOD PRESSURE: 62 MMHG | OXYGEN SATURATION: 97 % | WEIGHT: 120 LBS | BODY MASS INDEX: 20.49 KG/M2

## 2024-07-02 DIAGNOSIS — I48.0 PAF (PAROXYSMAL ATRIAL FIBRILLATION) (HCC): Primary | ICD-10-CM

## 2024-07-02 DIAGNOSIS — I48.4 ATYPICAL ATRIAL FLUTTER (HCC): ICD-10-CM

## 2024-07-02 DIAGNOSIS — Z79.01 CURRENT USE OF ANTICOAGULANT THERAPY: ICD-10-CM

## 2024-07-02 DIAGNOSIS — R55 SYNCOPE AND COLLAPSE: ICD-10-CM

## 2024-08-12 ENCOUNTER — TELEPHONE (OUTPATIENT)
Dept: CARDIOLOGY CLINIC | Age: 74
End: 2024-08-12

## 2024-08-12 NOTE — TELEPHONE ENCOUNTER
Patient called in to state she is going on vacation from 8/19-9/6/24.   States there will be no readings on her loop recorder during that time because she does not want to bring her machine with her.     Please advise.     Callback: [phone]

## 2024-10-11 PROCEDURE — 93298 REM INTERROG DEV EVAL SCRMS: CPT | Performed by: INTERNAL MEDICINE

## 2024-11-08 ENCOUNTER — TELEPHONE (OUTPATIENT)
Dept: CARDIOLOGY CLINIC | Age: 74
End: 2024-11-08

## 2024-11-08 NOTE — TELEPHONE ENCOUNTER
ILR implanted for palpitations with history of recurrent atrial tachydysrhythmia and long term rhythm detection.     ILR has reached RRT.     LOV:  07/02/24 and seen Nish for second opinion 08/09/24. During Nish OV, she was restarted on Amiodarone with plan to follow up at [facility]. No follow up scheduled at this time

## 2024-11-08 NOTE — TELEPHONE ENCOUNTER
Patient's ILR reached RRT 10.06.24, likely EOS since 11.01.24 (last monitor connection). No upcoming EP appts. Last saw CMV 07.02.24. Report sent to [facility] for review via [facility].

## 2024-11-08 NOTE — TELEPHONE ENCOUNTER
Please reach out to the patient and find out where she wishes to follow. If she wished to continue to follow with us, please schedule a follow up appointment with CMV regarding ILR reaching RRT

## 2024-11-08 NOTE — TELEPHONE ENCOUNTER
Spoke with patient she is scheduled to see Dr. Rhodes on 12/12/2024.  She is leaning toward getting device removed but will discuss further at her follow up appointment.

## 2024-12-11 NOTE — PROGRESS NOTES
Calcium-Magnesium-Vitamin D (CALCIUM 500 PO) Take 500 mg by mouth daily   Yes Provider, MD Hernan   calcitonin, salmon, (MIACALCIN) 200 UNIT/ACT nasal spray 1 spray by Nasal route daily   Yes Provider, Hernan, MD       Past Medical History:   Diagnosis Date    Acid reflux     Atrial fibrillation (HCC)         Past Surgical History:   Procedure Laterality Date    ABLATION OF DYSRHYTHMIC FOCUS  04/22/2022    COLONOSCOPY  11/04/2014    ENDOSCOPY, COLON, DIAGNOSTIC  11/04/2014    with biopsy    TOOTH EXTRACTION  1967    Dr. Madi Trevino    TUBAL LIGATION Bilateral 1977    Kemal Mock Dr    WISDOM TOOTH EXTRACTION  1965    Dr. Keith Trevino       No Known Allergies    Social History:  Reviewed.  reports that she has never smoked. She has never used smokeless tobacco. She reports that she does not drink alcohol and does not use drugs.     Family History:  Reviewed. No family history of SCD.      Physical Examination:  Vitals:    12/12/24 0757   BP: 114/72   Pulse: 63   SpO2: 99%        Wt Readings from Last 3 Encounters:   12/12/24 54.4 kg (120 lb)   07/02/24 54.4 kg (120 lb)   05/17/24 54.4 kg (120 lb)     No acute distress  Chest clear, nonlabored  Heart is regular rate, regular rhythm, no murmurs  Abdomen is rounded, soft, nontender  Strength is grossly preserved, normal gait, normal tone  No focal neurologic deficits    Labs:    Lab Results   Component Value Date    TSH 2.44 07/12/2021     Lab Results   Component Value Date    WBC 5.4 05/16/2024    HGB 11.9 (L) 05/16/2024    HCT 35.0 (L) 05/16/2024    MCV 87.1 05/16/2024     05/16/2024     Lab Results   Component Value Date/Time     05/16/2024 05:34 AM    K 5.1 05/16/2024 05:34 AM     05/16/2024 05:34 AM    CO2 26 05/16/2024 05:34 AM    BUN 17 05/16/2024 05:34 AM    CREATININE 0.9 05/16/2024 05:34 AM    GLUCOSE 93 05/16/2024 05:34 AM    CALCIUM 9.3 05/16/2024 05:34 AM    LABGLOM 67 05/16/2024 05:34 AM    LABGLOM >60

## 2024-12-12 ENCOUNTER — OFFICE VISIT (OUTPATIENT)
Dept: CARDIOLOGY CLINIC | Age: 74
End: 2024-12-12

## 2024-12-12 VITALS
BODY MASS INDEX: 20.6 KG/M2 | OXYGEN SATURATION: 99 % | HEART RATE: 63 BPM | SYSTOLIC BLOOD PRESSURE: 114 MMHG | WEIGHT: 120 LBS | DIASTOLIC BLOOD PRESSURE: 72 MMHG

## 2024-12-12 DIAGNOSIS — I48.0 PAF (PAROXYSMAL ATRIAL FIBRILLATION) (HCC): ICD-10-CM

## 2024-12-12 DIAGNOSIS — Z79.01 CURRENT USE OF ANTICOAGULANT THERAPY: ICD-10-CM

## 2024-12-12 DIAGNOSIS — Z79.899 ON AMIODARONE THERAPY: Primary | ICD-10-CM

## 2024-12-12 LAB
ALBUMIN SERPL-MCNC: 4.5 G/DL (ref 3.4–5)
ALP SERPL-CCNC: 89 U/L (ref 40–129)
ALT SERPL-CCNC: 20 U/L (ref 10–40)
AST SERPL-CCNC: 21 U/L (ref 15–37)
BILIRUB DIRECT SERPL-MCNC: <0.1 MG/DL (ref 0–0.3)
BILIRUB INDIRECT SERPL-MCNC: NORMAL MG/DL (ref 0–1)
BILIRUB SERPL-MCNC: <0.2 MG/DL (ref 0–1)
PROT SERPL-MCNC: 6.7 G/DL (ref 6.4–8.2)
TSH SERPL DL<=0.005 MIU/L-ACNC: 3.04 UIU/ML (ref 0.27–4.2)

## 2024-12-12 RX ORDER — AMIODARONE HYDROCHLORIDE 200 MG/1
200 TABLET ORAL DAILY
COMMUNITY
Start: 2024-10-29

## 2024-12-12 NOTE — PATIENT INSTRUCTIONS
If you have questions regarding your Loop recorder explant or would like to proceed with scheduling please call nurse at [phone].     Loop Recorder Explant Pre Procedure Instructions     Date:____________________________     Arrive at:_________________________     Procedure time:_____________________        The morning of your procedure you will park in the [facility] parking lot and report directly to the cath lab to check in.  At the information desk stay right and go all the way to the end of the mcconnell, this will take you directly to your check in desk for the cath lab.       Pre-Procedure Instructions  Do not eat or drink anything for two hours prior to your procedure.  Do not use any lotions, creams or perfume the morning of procedure.  Cath lab will provide you with all post procedure instructions.  You will follow up one week after explantation to check site to make sure it is healing well.      --------------------------------------------------------------------------     Amiodarone Instructions    You are taking amiodarone which is an antiarrhythmic medication. This helps keep you in a regular rhythm when you have atrial fibrillation atrial flutter or ventricular tachycardia.    Due to the potential for side effects of the amiodarone, we monitor your liver, thyroid and lungs while taking this medication.    A T4, TSH and liver profile should be done every 6 months while taking amiodarone. These are blood tests that look at the levels in your thryoid and liver.    A chest xray should be done at least once a year. A Pulmonary Function Test should be done once a year.     You should wear sunscreen daily as your skin  can burn easily while taking amiodarone.    You should have your eyes examined once a year checking for corneal deposits while taking amiodarone.

## 2024-12-17 ENCOUNTER — HOSPITAL ENCOUNTER (OUTPATIENT)
Dept: PULMONOLOGY | Age: 74
Discharge: HOME | End: 2024-12-17
Attending: INTERNAL MEDICINE
Payer: MEDICARE

## 2024-12-17 DIAGNOSIS — I48.0 PAF (PAROXYSMAL ATRIAL FIBRILLATION) (HCC): ICD-10-CM

## 2024-12-17 DIAGNOSIS — Z79.899 ON AMIODARONE THERAPY: ICD-10-CM

## 2024-12-17 LAB
DLCO %PRED: 84 %
DLCO PRED: NORMAL
DLCO/VA %PRED: NORMAL
DLCO/VA PRED: NORMAL
DLCO/VA: NORMAL
DLCO: 15.79 ML/MIN/MMHG
EXPIRATORY TIME-POST: NORMAL
EXPIRATORY TIME: NORMAL
FEF 25-75 %CHNG: NORMAL
FEF 25-75 POST %PRED: NORMAL
FEF 25-75% %PRED-PRE: NORMAL
FEF 25-75% PRED: NORMAL
FEF 25-75-POST: NORMAL
FEF 25-75-PRE: NORMAL
FEV1 %PRED-POST: NORMAL
FEV1 %PRED-PRE: 94 %
FEV1 PRED: NORMAL
FEV1-POST: NORMAL
FEV1-PRE: NORMAL
FEV1/FVC %PRED-POST: NORMAL
FEV1/FVC %PRED-PRE: NORMAL
FEV1/FVC PRED: NORMAL
FEV1/FVC-POST: NORMAL
FEV1/FVC-PRE: 74 %
FVC %PRED-POST: NORMAL
FVC %PRED-PRE: 97 %
FVC PRED: NORMAL
FVC-POST: NORMAL
FVC-PRE: 2.62 L
GAW %PRED: NORMAL
GAW PRED: NORMAL
GAW: NORMAL
IC PRE %PRED: NORMAL
IC PRED: NORMAL
IC: NORMAL
MEP: NORMAL
MIP: NORMAL
MVV %PRED-PRE: NORMAL
MVV PRED: NORMAL
MVV-PRE: NORMAL
PEF %PRED-POST: NORMAL
PEF %PRED-PRE: NORMAL
PEF PRED: NORMAL
PEF%CHNG: NORMAL
PEF-POST: NORMAL
PEF-PRE: NORMAL
RAW %PRED: NORMAL
RAW PRED: NORMAL
RAW: NORMAL
RV PRE %PRED: NORMAL
RV PRED: NORMAL
RV: NORMAL
SVC %PRED: NORMAL
SVC PRED: NORMAL
SVC: NORMAL
TLC PRE %PRED: 102 %
TLC PRED: NORMAL
TLC: NORMAL
VA %PRED: NORMAL
VA PRED: NORMAL
VA: NORMAL
VTG %PRED: NORMAL
VTG PRED: NORMAL
VTG: NORMAL

## 2024-12-17 PROCEDURE — 94726 PLETHYSMOGRAPHY LUNG VOLUMES: CPT

## 2024-12-17 PROCEDURE — 94010 BREATHING CAPACITY TEST: CPT

## 2024-12-17 PROCEDURE — 94729 DIFFUSING CAPACITY: CPT

## 2024-12-17 RX ORDER — ALBUTEROL SULFATE 90 UG/1
4 INHALANT RESPIRATORY (INHALATION) ONCE
Status: DISCONTINUED | OUTPATIENT
Start: 2024-12-17 | End: 2024-12-17

## 2024-12-17 ASSESSMENT — PULMONARY FUNCTION TESTS
FVC_PERCENT_PREDICTED_PRE: 97
FVC_PRE: 2.62
FEV1_PERCENT_PREDICTED_PRE: 94
FEV1/FVC_PRE: 74

## 2024-12-17 NOTE — PROCEDURES
PROCEDURE NOTE  Date: 12/17/2024   Name: Jerri Henry  YOB: 1950    Spirometry was acceptable and reproducible by ATS standards      Spirometry/Flow volume loop:  Spirometry and flow volume loops do not show airflow obstruction. FEV1 94%.  FVC 97%There is no bronchodilator response.      Lung volumes:  Lung volumes do not demonstrate restriction or hyperinflation.    Diffusing capacity:  DLCO is normal.            Summary:  Normal PFTs      TLC Pre %Pred   Date Value Ref Range Status   12/17/2024 102 % Final     DLCO %Pred   Date Value Ref Range Status   12/17/2024 84 % Final       PFT data will be scanned into the media tab under this encounter. Please see the scanned data for numerical values.     Lefty Chang MD  [facility] Pulmonary, Sleep and Critical Care Medicine

## 2025-04-08 ENCOUNTER — TELEPHONE (OUTPATIENT)
Dept: CARDIOLOGY CLINIC | Age: 75
End: 2025-04-08

## 2025-04-08 NOTE — TELEPHONE ENCOUNTER
Jerri states she thought she took her amiodarone but found the pill on the counter,she states she believes she did take it,she wants to know if it would hurt to take it again.      She can be reached at [phone].

## 2025-04-08 NOTE — TELEPHONE ENCOUNTER
LVM for patient to return call. If she is uncertain if she took the amiodarone, she should just wait until tomorrow to take next dose.

## 2025-04-30 RX ORDER — AMIODARONE HYDROCHLORIDE 200 MG/1
200 TABLET ORAL DAILY
Qty: 90 TABLET | Refills: 0 | Status: SHIPPED | OUTPATIENT
Start: 2025-04-30

## 2025-04-30 NOTE — TELEPHONE ENCOUNTER
Last OV: 12/12/24  Next OV: 6/24/25  Last refill: 10/29/24 not sent-historical provider  Most recent Labs: 12/12/24  Last EKG (if needed): 12/12/24 TSH

## 2025-04-30 NOTE — TELEPHONE ENCOUNTER
Medication Refill    When was your last appointment with cardiology?    (If 1 yr or longer, please schedule appointment)    (If patient has been told they do not need to follow-up - medications should be filled by PCP)  12/17/24  When did you last have labs drawn?     Medication needing refilled?  amiodarone (CORDARONE)     Dosage of the medication?  200 MG tablet     How are you taking this medication (QD, BID, TID, QID, PRN)?  Take 1 tablet by mouth daily     Do you want a 30 or 90 day supply?  90  When will you run out of your medication?     Which Pharmacy are we sending this medication to?  [id number] [address]   Pharmacy Phone: [phone]   Pharmacy Fax: [phone]

## 2025-06-23 NOTE — PROGRESS NOTES
[facility]   Electrophysiology Follow up    Date: 6/24/2025    CC: \"I am doing well on miracle drug\"    HPI: Jerri Henry is a 74 y.o. female with history of syncope, paroxysmal atrial fibrillation, underwent pulmonary vein isolation and left sided lines in August 2020, ILR in July 2021, repeat PVI, CFAE, left-sided lines in September 2021, recurrent atrial fibrillation on ILR in 2022, repeat ablation in 2022, follow-up with EP NP on 11/6/2023, ILR with short episodes of atrial fibrillation, less than 1% burden, continue to experience palpitations at July visit with myself.  We had discussed options for the management of atrial fibrillation, I did not feel that repeat ablation would be beneficial, wanting to avoid class I and III agents, recommended amiodarone, she sought second opinion at [facility] who also recommended similar, maintained on 200 mg daily.    Patient presents as follow-up for management of atrial fibrillation.  Since starting amiodarone she has not had any noted recurrences of atrial fibrillation.  She maintains a meticulous log of heart rates and blood pressures, normotensive, resting heart rates typically in the 50s.    Review of System:  [x] Full ROS obtained and negative except as mentioned in HPI or below      Prior to Admission medications    Medication Sig Start Date End Date Taking? Authorizing Provider   latanoprost (XALATAN) 0.005 % ophthalmic solution Place 1 drop into both eyes nightly 6/23/25  Yes ProviderHernan MD   amiodarone (CORDARONE) 200 MG tablet Take 1 tablet by mouth daily 4/30/25  Yes Wesley Rhodes MD   Cyanocobalamin (B-12) 5000 MCG SUBL Place 5,000 Units under the tongue daily   Yes ProviderHernan MD   famotidine (PEPCID) 20 MG tablet Take 1 tablet by mouth 2 times daily   Yes ProviderHernan MD   apixaban (ELIQUIS) 5 MG TABS tablet Take 1 tablet by mouth 2 times daily 2/19/20  Yes Holger Petty MD   Calcium-Magnesium-Vitamin D

## 2025-06-24 ENCOUNTER — OFFICE VISIT (OUTPATIENT)
Dept: CARDIOLOGY CLINIC | Age: 75
End: 2025-06-24
Payer: MEDICARE

## 2025-06-24 VITALS
HEIGHT: 64 IN | HEART RATE: 65 BPM | DIASTOLIC BLOOD PRESSURE: 70 MMHG | OXYGEN SATURATION: 97 % | BODY MASS INDEX: 20.14 KG/M2 | WEIGHT: 118 LBS | SYSTOLIC BLOOD PRESSURE: 108 MMHG

## 2025-06-24 DIAGNOSIS — Z95.818 IMPLANTABLE LOOP RECORDER PRESENT: ICD-10-CM

## 2025-06-24 DIAGNOSIS — I48.0 PAF (PAROXYSMAL ATRIAL FIBRILLATION) (HCC): Primary | ICD-10-CM

## 2025-06-24 DIAGNOSIS — I48.0 PAF (PAROXYSMAL ATRIAL FIBRILLATION) (HCC): ICD-10-CM

## 2025-06-24 DIAGNOSIS — I48.4 ATYPICAL ATRIAL FLUTTER (HCC): ICD-10-CM

## 2025-06-24 DIAGNOSIS — Z79.899 ON AMIODARONE THERAPY: ICD-10-CM

## 2025-06-24 DIAGNOSIS — Z79.01 CURRENT USE OF ANTICOAGULANT THERAPY: ICD-10-CM

## 2025-06-24 LAB
ALBUMIN SERPL-MCNC: 4.4 G/DL (ref 3.4–5)
ALP SERPL-CCNC: 101 U/L (ref 40–129)
ALT SERPL-CCNC: 23 U/L (ref 10–40)
AST SERPL-CCNC: 21 U/L (ref 15–37)
BILIRUB DIRECT SERPL-MCNC: 0.2 MG/DL (ref 0–0.3)
BILIRUB INDIRECT SERPL-MCNC: 0.1 MG/DL (ref 0–1)
BILIRUB SERPL-MCNC: 0.3 MG/DL (ref 0–1)
PROT SERPL-MCNC: 6.7 G/DL (ref 6.4–8.2)
TSH SERPL DL<=0.005 MIU/L-ACNC: 2.43 UIU/ML (ref 0.27–4.2)

## 2025-06-24 PROCEDURE — 1159F MED LIST DOCD IN RCRD: CPT | Performed by: INTERNAL MEDICINE

## 2025-06-24 PROCEDURE — 1123F ACP DISCUSS/DSCN MKR DOCD: CPT | Performed by: INTERNAL MEDICINE

## 2025-06-24 PROCEDURE — G8420 CALC BMI NORM PARAMETERS: HCPCS | Performed by: INTERNAL MEDICINE

## 2025-06-24 PROCEDURE — 3017F COLORECTAL CA SCREEN DOC REV: CPT | Performed by: INTERNAL MEDICINE

## 2025-06-24 PROCEDURE — G2211 COMPLEX E/M VISIT ADD ON: HCPCS | Performed by: INTERNAL MEDICINE

## 2025-06-24 PROCEDURE — 1036F TOBACCO NON-USER: CPT | Performed by: INTERNAL MEDICINE

## 2025-06-24 PROCEDURE — G8427 DOCREV CUR MEDS BY ELIG CLIN: HCPCS | Performed by: INTERNAL MEDICINE

## 2025-06-24 PROCEDURE — 93000 ELECTROCARDIOGRAM COMPLETE: CPT | Performed by: INTERNAL MEDICINE

## 2025-06-24 PROCEDURE — 99214 OFFICE O/P EST MOD 30 MIN: CPT | Performed by: INTERNAL MEDICINE

## 2025-06-24 PROCEDURE — 1090F PRES/ABSN URINE INCON ASSESS: CPT | Performed by: INTERNAL MEDICINE

## 2025-06-24 PROCEDURE — G8399 PT W/DXA RESULTS DOCUMENT: HCPCS | Performed by: INTERNAL MEDICINE

## 2025-06-24 RX ORDER — LATANOPROST 50 UG/ML
1 SOLUTION/ DROPS OPHTHALMIC NIGHTLY
COMMUNITY
Start: 2025-06-23

## 2025-06-24 RX ORDER — AMIODARONE HYDROCHLORIDE 200 MG/1
100 TABLET ORAL DAILY
Qty: 45 TABLET | Refills: 1 | Status: SHIPPED | OUTPATIENT
Start: 2025-06-24

## 2025-06-24 RX ORDER — AMIODARONE HYDROCHLORIDE 200 MG/1
100 TABLET ORAL DAILY
Qty: 45 TABLET | Refills: 1
Start: 2025-06-24 | End: 2025-06-24

## 2025-06-24 RX ORDER — AMIODARONE HYDROCHLORIDE 200 MG/1
100 TABLET ORAL DAILY
Qty: 45 TABLET | Refills: 1 | Status: SHIPPED | OUTPATIENT
Start: 2025-06-24 | End: 2025-06-24

## 2025-06-24 NOTE — PATIENT INSTRUCTIONS
Call our office at [phone] and ask to speak with Dr. Rhodes's nurse Cici if you have any issues on decreased dose of amiodarone